# Patient Record
Sex: FEMALE | Race: OTHER | HISPANIC OR LATINO | Employment: UNEMPLOYED | URBAN - METROPOLITAN AREA
[De-identification: names, ages, dates, MRNs, and addresses within clinical notes are randomized per-mention and may not be internally consistent; named-entity substitution may affect disease eponyms.]

---

## 2023-07-20 LAB
EXTERNAL CHLAMYDIA RESULT: NOT DETECTED
N GONORRHOEA RRNA SPEC QL PROBE: NOT DETECTED

## 2023-07-21 LAB
ABO/RH(D) (HISTORICAL): NORMAL
EXTERNAL HEPATITIS B SURFACE ANTIGEN: NORMAL
EXTERNAL SYPHILIS TOTAL IGG/IGM SCREENING: NORMAL

## 2023-11-07 ENCOUNTER — TELEPHONE (OUTPATIENT)
Dept: OBGYN CLINIC | Facility: MEDICAL CENTER | Age: 28
End: 2023-11-07

## 2023-11-07 DIAGNOSIS — Z34.92 SECOND TRIMESTER PREGNANCY: Primary | ICD-10-CM

## 2023-11-07 NOTE — TELEPHONE ENCOUNTER
All records reviewed. OB intake scheduled with us for 11/20. Patient reports insurance will be active after 11/16. MFM referral placed and number provided. LMP: 5/13 - FRANK 2/17/24.

## 2023-11-11 ENCOUNTER — HOSPITAL ENCOUNTER (EMERGENCY)
Facility: HOSPITAL | Age: 28
Discharge: HOME | End: 2023-11-11
Attending: EMERGENCY MEDICINE | Admitting: EMERGENCY MEDICINE
Payer: COMMERCIAL

## 2023-11-11 ENCOUNTER — OFFICE VISIT (OUTPATIENT)
Dept: URGENT CARE | Age: 28
End: 2023-11-11
Payer: COMMERCIAL

## 2023-11-11 VITALS
TEMPERATURE: 98.3 F | WEIGHT: 187.39 LBS | BODY MASS INDEX: 32.79 KG/M2 | DIASTOLIC BLOOD PRESSURE: 64 MMHG | HEART RATE: 71 BPM | OXYGEN SATURATION: 100 % | RESPIRATION RATE: 16 BRPM | SYSTOLIC BLOOD PRESSURE: 109 MMHG

## 2023-11-11 VITALS
TEMPERATURE: 98.6 F | DIASTOLIC BLOOD PRESSURE: 68 MMHG | BODY MASS INDEX: 31.15 KG/M2 | RESPIRATION RATE: 20 BRPM | WEIGHT: 178 LBS | HEART RATE: 76 BPM | SYSTOLIC BLOOD PRESSURE: 108 MMHG | OXYGEN SATURATION: 97 %

## 2023-11-11 DIAGNOSIS — R42 DIZZINESS AND GIDDINESS: Primary | ICD-10-CM

## 2023-11-11 DIAGNOSIS — R51.9 NONINTRACTABLE HEADACHE, UNSPECIFIED CHRONICITY PATTERN, UNSPECIFIED HEADACHE TYPE: ICD-10-CM

## 2023-11-11 DIAGNOSIS — R51.9 HEADACHE: Primary | ICD-10-CM

## 2023-11-11 LAB
ALBUMIN SERPL BCP-MCNC: 3.6 G/DL (ref 3.5–5)
ALP SERPL-CCNC: 52 U/L (ref 34–104)
ALT SERPL W P-5'-P-CCNC: 20 U/L (ref 7–52)
ANION GAP SERPL CALCULATED.3IONS-SCNC: 8 MMOL/L
AST SERPL W P-5'-P-CCNC: 50 U/L (ref 13–39)
BACTERIA UR QL AUTO: NORMAL /HPF
BASOPHILS # BLD MANUAL: 0 THOUSAND/UL (ref 0–0.1)
BASOPHILS NFR MAR MANUAL: 0 % (ref 0–1)
BILIRUB SERPL-MCNC: 0.51 MG/DL (ref 0.2–1)
BILIRUB UR QL STRIP: NEGATIVE
BUN SERPL-MCNC: 12 MG/DL (ref 5–25)
CALCIUM SERPL-MCNC: 9.4 MG/DL (ref 8.4–10.2)
CHLORIDE SERPL-SCNC: 102 MMOL/L (ref 96–108)
CLARITY UR: ABNORMAL
CO2 SERPL-SCNC: 22 MMOL/L (ref 21–32)
COLOR UR: YELLOW
CREAT SERPL-MCNC: 0.5 MG/DL (ref 0.6–1.3)
EOSINOPHIL # BLD MANUAL: 0.29 THOUSAND/UL (ref 0–0.4)
EOSINOPHIL NFR BLD MANUAL: 3 % (ref 0–6)
ERYTHROCYTE [DISTWIDTH] IN BLOOD BY AUTOMATED COUNT: 12.5 % (ref 11.6–15.1)
EXT PREGNANCY TEST URINE: POSITIVE
EXT. CONTROL: ABNORMAL
GFR SERPL CREATININE-BSD FRML MDRD: 132 ML/MIN/1.73SQ M
GLUCOSE SERPL-MCNC: 107 MG/DL (ref 65–140)
GLUCOSE SERPL-MCNC: 79 MG/DL (ref 65–140)
GLUCOSE UR STRIP-MCNC: NEGATIVE MG/DL
HCT VFR BLD AUTO: 31 % (ref 34.8–46.1)
HGB BLD-MCNC: 9.9 G/DL (ref 11.5–15.4)
HGB UR QL STRIP.AUTO: NEGATIVE
KETONES UR STRIP-MCNC: NEGATIVE MG/DL
LEUKOCYTE ESTERASE UR QL STRIP: ABNORMAL
LYMPHOCYTES # BLD AUTO: 1.43 THOUSAND/UL (ref 0.6–4.47)
LYMPHOCYTES # BLD AUTO: 15 % (ref 14–44)
MCH RBC QN AUTO: 27.7 PG (ref 26.8–34.3)
MCHC RBC AUTO-ENTMCNC: 31.9 G/DL (ref 31.4–37.4)
MCV RBC AUTO: 87 FL (ref 82–98)
MONOCYTES # BLD AUTO: 0.1 THOUSAND/UL (ref 0–1.22)
MONOCYTES NFR BLD: 1 % (ref 4–12)
MYELOCYTES NFR BLD MANUAL: 1 % (ref 0–1)
NEUTROPHILS # BLD MANUAL: 7.6 THOUSAND/UL (ref 1.85–7.62)
NEUTS SEG NFR BLD AUTO: 80 % (ref 43–75)
NITRITE UR QL STRIP: NEGATIVE
NON-SQ EPI CELLS URNS QL MICRO: NORMAL /HPF
PH UR STRIP.AUTO: 7 [PH] (ref 4.5–8)
PLATELET # BLD AUTO: 191 THOUSANDS/UL (ref 149–390)
PLATELET BLD QL SMEAR: ADEQUATE
PMV BLD AUTO: 10.2 FL (ref 8.9–12.7)
POTASSIUM SERPL-SCNC: 4.7 MMOL/L (ref 3.5–5.3)
PROT SERPL-MCNC: 6.6 G/DL (ref 6.4–8.4)
PROT UR STRIP-MCNC: NEGATIVE MG/DL
RBC # BLD AUTO: 3.57 MILLION/UL (ref 3.81–5.12)
RBC #/AREA URNS AUTO: NORMAL /HPF
RBC MORPH BLD: NORMAL
SL AMB POCT GLUCOSE BLD: 79
SODIUM SERPL-SCNC: 132 MMOL/L (ref 135–147)
SP GR UR STRIP.AUTO: 1.02 (ref 1–1.03)
UROBILINOGEN UR QL STRIP.AUTO: 0.2 E.U./DL
WBC # BLD AUTO: 9.5 THOUSAND/UL (ref 4.31–10.16)
WBC #/AREA URNS AUTO: NORMAL /HPF

## 2023-11-11 PROCEDURE — 36415 COLL VENOUS BLD VENIPUNCTURE: CPT | Performed by: EMERGENCY MEDICINE

## 2023-11-11 PROCEDURE — 80053 COMPREHEN METABOLIC PANEL: CPT | Performed by: EMERGENCY MEDICINE

## 2023-11-11 PROCEDURE — 85007 BL SMEAR W/DIFF WBC COUNT: CPT | Performed by: EMERGENCY MEDICINE

## 2023-11-11 PROCEDURE — 81025 URINE PREGNANCY TEST: CPT | Performed by: EMERGENCY MEDICINE

## 2023-11-11 PROCEDURE — 96374 THER/PROPH/DIAG INJ IV PUSH: CPT

## 2023-11-11 PROCEDURE — 96361 HYDRATE IV INFUSION ADD-ON: CPT

## 2023-11-11 PROCEDURE — 82948 REAGENT STRIP/BLOOD GLUCOSE: CPT | Performed by: FAMILY MEDICINE

## 2023-11-11 PROCEDURE — 87086 URINE CULTURE/COLONY COUNT: CPT

## 2023-11-11 PROCEDURE — 81001 URINALYSIS AUTO W/SCOPE: CPT

## 2023-11-11 PROCEDURE — 96375 TX/PRO/DX INJ NEW DRUG ADDON: CPT

## 2023-11-11 PROCEDURE — 99213 OFFICE O/P EST LOW 20 MIN: CPT | Performed by: FAMILY MEDICINE

## 2023-11-11 PROCEDURE — 85027 COMPLETE CBC AUTOMATED: CPT | Performed by: EMERGENCY MEDICINE

## 2023-11-11 PROCEDURE — 99284 EMERGENCY DEPT VISIT MOD MDM: CPT

## 2023-11-11 PROCEDURE — 99284 EMERGENCY DEPT VISIT MOD MDM: CPT | Performed by: EMERGENCY MEDICINE

## 2023-11-11 RX ORDER — METOCLOPRAMIDE HYDROCHLORIDE 5 MG/ML
10 INJECTION INTRAMUSCULAR; INTRAVENOUS ONCE
Status: COMPLETED | OUTPATIENT
Start: 2023-11-11 | End: 2023-11-11

## 2023-11-11 RX ORDER — DIPHENHYDRAMINE HYDROCHLORIDE 50 MG/ML
25 INJECTION INTRAMUSCULAR; INTRAVENOUS ONCE
Status: COMPLETED | OUTPATIENT
Start: 2023-11-11 | End: 2023-11-11

## 2023-11-11 RX ADMIN — DIPHENHYDRAMINE HYDROCHLORIDE 25 MG: 50 INJECTION, SOLUTION INTRAMUSCULAR; INTRAVENOUS at 15:21

## 2023-11-11 RX ADMIN — METOCLOPRAMIDE 10 MG: 5 INJECTION, SOLUTION INTRAMUSCULAR; INTRAVENOUS at 15:21

## 2023-11-11 RX ADMIN — SODIUM CHLORIDE 1000 ML: 0.9 INJECTION, SOLUTION INTRAVENOUS at 15:24

## 2023-11-11 NOTE — PATIENT INSTRUCTIONS
Random glucose in the office was 78. Orthostatic blood pressure reading did not show any acute change in blood pressure. Patient however still symptomatic. I referred the patient to [facility] emergency room for further evaluation. Transported by private vehicle in stable condition. Dizziness   WHAT YOU NEED TO KNOW:   Dizziness is a feeling of being off balance or unsteady. Common causes of dizziness are an inner ear fluid imbalance or a lack of oxygen in your blood. Dizziness may be acute (lasts 3 days or less) or chronic (lasts longer than 3 days). You may have dizzy spells that last from seconds to a few hours. DISCHARGE INSTRUCTIONS:   Return to the emergency department if:   You have a headache and a stiff neck. You have shaking chills and a fever. You vomit over and over with no relief. Your vomit or bowel movements are red or black. You have pain in your chest, back, or abdomen. You have numbness, especially in your face, arms, or legs. You have trouble moving your arms or legs. You are confused. Contact your healthcare provider if:   You have a fever. Your symptoms do not get better with treatment. You have questions or concerns about your condition or care. Manage your symptoms:   Do not drive  or operate heavy machinery when you are dizzy. Get up slowly  from sitting or lying down. Drink plenty of liquids. Liquids help prevent dehydration. Ask how much liquid to drink each day and which liquids are best for you. Follow up with your doctor as directed:  Write down your questions so you remember to ask them during your visits. © Copyright letta [employer] 2023 Information is for End User's use only and may not be sold, redistributed or otherwise used for commercial purposes. The above information is an  only. It is not intended as medical advice for individual conditions or treatments.  Talk to your doctor, nurse or pharmacist before following any medical regimen to see if it is safe and effective for you.

## 2023-11-11 NOTE — ED PROVIDER NOTES
Pt Name: Jacqui Bejarano  MRN: [id number]  [address] 1995  Age/Sex: 29 y.o. female  Date of evaluation: 2023  PCP: Rodney Diego, [address]    Chief Complaint   Patient presents with    Headache     Pt c/o headache since last night, reports had episode where felt lightheaded. Hx migraines. Reports being 26 weeks pregnant denies any vaginal bleeding and contraction. HPI    Rima Andrew presents to the Emergency Department complaining of migraine headache. She had been on preventative maintenance medication prior to be getting pregnant. She is currently   at 26 weeks and this is her first significant headache this pregnancy. She took tylenol for the headache but it did not seem to help. Today she started to feel slightly lightheaded and was concerned. This is new and not usually associated with her headaches. She has no pregnancy related complaints. HPI      Past Medical and Surgical History    Past Medical History:   Diagnosis Date    Anxiety     Asthma     Disease of thyroid gland     Enteritis 12/10/2018    Pancytopenia ([address]) 2018       Past Surgical History:   Procedure Laterality Date    EXTERNAL EAR SURGERY         Family History   Problem Relation Age of Onset    Heart disease Maternal Grandmother     Hypertension Maternal Grandmother     Hyperlipidemia Maternal Grandmother     Heart disease Paternal Grandfather     Hypertension Father     No Known Problems Mother        Social History     Tobacco Use    Smoking status: Never    Smokeless tobacco: Never   Vaping Use    Vaping Use: Never used   Substance Use Topics    Alcohol use: Yes     Comment: social     Drug use: No         .    Allergies    No Known Allergies    Home Medications    Prior to Admission medications    Medication Sig Start Date End Date Taking?  Authorizing Provider   albuterol (PROVENTIL HFA,VENTOLIN HFA) 90 mcg/act inhaler Inhale 2 puffs every 6 (six) hours as needed for wheezing or shortness of breath 9/0/68   Eduardo Spatz, DO   clindamycin (CLEOCIN T) 1 %   8/19/20   Historical Provider, MD   Ferrous Sulfate (IRON PO) Take 1 tablet by mouth daily    Historical Provider, MD   levothyroxine 25 mcg tablet Take 1 tablet (25 mcg total) by mouth daily 62/0/24   Parker Francisco DO   MAGNESIUM PO Take 1 tablet by mouth daily    Historical Provider, MD   medroxyPROGESTERone (PROVERA) 10 mg tablet Take 1 tablet (10 mg total) by mouth in the morning and 1 tablet (10 mg total) before bedtime. Do all this for 5 days. 5/19/22 5/24/22  Dunia Olivia MD   Sulfacetamide-Sulfur-Cleanser (SULFACETAMIDE SOD-SULFUR [facility] EX) Apply topically      Historical Provider, MD   TRETINOIN EX Apply topically      Historical Provider, MD           Review of Systems    Review of Systems   Constitutional:  Negative for chills and fever. HENT:  Negative for ear pain and sore throat. Eyes:  Negative for pain and visual disturbance. Respiratory:  Negative for cough and shortness of breath. Cardiovascular:  Negative for chest pain and palpitations. Gastrointestinal:  Negative for abdominal pain and vomiting. Genitourinary:  Negative for dysuria and hematuria. Musculoskeletal:  Negative for arthralgias and back pain. Skin:  Negative for color change and rash. Neurological:  Positive for headaches. Negative for seizures and syncope. All other systems reviewed and are negative. All other systems reviewed and negative.     Physical Exam      ED Triage Vitals   Temperature Pulse Respirations Blood Pressure SpO2   11/11/23 1422 11/11/23 1422 11/11/23 1422 11/11/23 1422 11/11/23 1422   98.3 °F (36.8 °C) 93 16 117/57 98 %      Temp Source Heart Rate Source Patient Position - Orthostatic VS BP Location FiO2 (%)   11/11/23 1422 11/11/23 1422 11/11/23 1422 11/11/23 1422 --   Oral Monitor Sitting Right arm       Pain Score       11/11/23 1458       4               Physical Exam  Vitals and nursing note reviewed. Constitutional:       General: She is not in acute distress. Appearance: She is well-developed. HENT:      Head: Normocephalic and atraumatic. Eyes:      Conjunctiva/sclera: Conjunctivae normal.   Cardiovascular:      Rate and Rhythm: Normal rate and regular rhythm. Heart sounds: No murmur heard. Pulmonary:      Effort: Pulmonary effort is normal. No respiratory distress. Breath sounds: Normal breath sounds. Abdominal:      Palpations: Abdomen is soft. Tenderness: There is no abdominal tenderness. Musculoskeletal:         General: No swelling. Cervical back: Neck supple. Skin:     General: Skin is warm and dry. Capillary Refill: Capillary refill takes less than 2 seconds. Neurological:      Mental Status: She is alert. Psychiatric:         Mood and Affect: Mood normal.           Assessment and Plan    Eduardo Fuller is a 29 y.o. female who presents with migraine headache. Physical examination remarkable for gravid uterus. Differential diagnosis (not completely inclusive) includes migraine headache. Plan will be to perform diagnostic testing and treat symptomatically.       MDM      Diagnostic Results        Labs:    Results for orders placed or performed during the hospital encounter of 11/11/23   CBC and differential   Result Value Ref Range    WBC 9.50 4.31 - 10.16 Thousand/uL    RBC 3.57 (L) 3.81 - 5.12 Million/uL    Hemoglobin 9.9 (L) 11.5 - 15.4 g/dL    Hematocrit 31.0 (L) 34.8 - 46.1 %    MCV 87 82 - 98 fL    MCH 27.7 26.8 - 34.3 pg    MCHC 31.9 31.4 - 37.4 g/dL    RDW 12.5 11.6 - 15.1 %    MPV 10.2 8.9 - 12.7 fL    Platelets 872 263 - 103 Thousands/uL   Comprehensive metabolic panel   Result Value Ref Range    Sodium 132 (L) 135 - 147 mmol/L    Potassium 4.7 3.5 - 5.3 mmol/L    Chloride 102 96 - 108 mmol/L    CO2 22 21 - 32 mmol/L    ANION GAP 8 mmol/L    BUN 12 5 - 25 mg/dL    Creatinine 0.50 (L) 0.60 - 1.30 mg/dL    Glucose 107 65 - 140 mg/dL Calcium 9.4 8.4 - 10.2 mg/dL    AST 50 (H) 13 - 39 U/L    ALT 20 7 - 52 U/L    Alkaline Phosphatase 52 34 - 104 U/L    Total Protein 6.6 6.4 - 8.4 g/dL    Albumin 3.6 3.5 - 5.0 g/dL    Total Bilirubin 0.51 0.20 - 1.00 mg/dL    eGFR 132 ml/min/1.73sq m   Urine Microscopic   Result Value Ref Range    RBC, UA None Seen None Seen, 1-2 /hpf    WBC, UA None Seen None Seen, 1-2 /hpf    Epithelial Cells Occasional None Seen, Occasional /hpf    Bacteria, UA Occasional None Seen, Occasional /hpf   POCT pregnancy, urine   Result Value Ref Range    EXT Preg Test, Ur Positive (A)     Control Valid    Urine Macroscopic, POC   Result Value Ref Range    Color, UA Yellow     Clarity, UA Cloudy     pH, UA 7.0 4.5 - 8.0    Leukocytes, UA Small (A) Negative    Nitrite, UA Negative Negative    Protein, UA Negative Negative mg/dl    Glucose, UA Negative Negative mg/dl    Ketones, UA Negative Negative mg/dl    Urobilinogen, UA 0.2 0.2, 1.0 E.U./dl E.U./dl    Bilirubin, UA Negative Negative    Occult Blood, UA Negative Negative    Specific Gravity, UA 1.020 1.003 - 1.030   Manual Differential(PHLEBS Do Not Order)   Result Value Ref Range    Segmented % 80 (H) 43 - 75 %    Lymphocytes % 15 14 - 44 %    Monocytes % 1 (L) 4 - 12 %    Eosinophils, % 3 0 - 6 %    Basophils % 0 0 - 1 %    Myelocytes % 1 0 - 1 %    Absolute Neutrophils 7.60 1.85 - 7.62 Thousand/uL    Lymphocytes Absolute 1.43 0.60 - 4.47 Thousand/uL    Monocytes Absolute 0.10 0.00 - 1.22 Thousand/uL    Eosinophils Absolute 0.29 0.00 - 0.40 Thousand/uL    Basophils Absolute 0.00 0.00 - 0.10 Thousand/uL    Total Counted      RBC Morphology Normal     Platelet Estimate Adequate Adequate       All labs reviewed and utilized in the medical decision making process    Radiology:    No orders to display       All radiology studies independently viewed by me and interpreted by the radiologist.    Procedure    Procedures      ED Course of Care and Re-Assessments    Patient was feeling better after meds and IVF. Fetal heart tones normal and she has been feeling the baby moving. Medications   sodium chloride 0.9 % bolus 1,000 mL (0 mL Intravenous Stopped 11/11/23 1712)   metoclopramide (REGLAN) injection 10 mg (10 mg Intravenous Given 11/11/23 1521)   diphenhydrAMINE (BENADRYL) injection 25 mg (25 mg Intravenous Given 11/11/23 1521)           FINAL IMPRESSION    Final diagnoses:   Headache         DISPOSITION/PLAN    Time reflects when diagnosis was documented in both MDM as applicable and the Disposition within this note       Time User Action Codes Description Comment    11/11/2023  5:04 PM Carmela Hawthorne Add [R51.9] Headache           ED Disposition       ED Disposition   Discharge    Condition   Stable    Date/Time   Sat Nov 11, 2023  5:04 PM    Comment   Galileo Barrera discharge to home/self care.                    Follow-up Information       Follow up With Specialties Details Why [address] [facility] [address] [facility] [address] [place], [address] [facility] Nurse Practitioner   [address] [facility], [address] [place], [place] [phone]                PATIENT REFERRED TO:    [address] [place] [address]  [address] [facility] [address]  [address] [place] [address]  [phone]            DISCHARGE MEDICATIONS:    Discharge Medication List as of 11/11/2023  5:05 PM        CONTINUE these medications which have NOT CHANGED    Details   albuterol (PROVENTIL HFA,VENTOLIN HFA) 90 mcg/act inhaler Inhale 2 puffs every 6 (six) hours as needed for wheezing or shortness of breath, Starting Fri 5/6/2022, Normal      clindamycin (CLEOCIN T) 1 %  , Starting Wed 8/19/2020, Historical Med      Ferrous Sulfate (IRON PO) Take 1 tablet by mouth daily, Historical Med      levothyroxine 25 mcg tablet Take 1 tablet (25 mcg total) by mouth daily, Starting Tue 12/6/2022, Normal      MAGNESIUM PO Take 1 tablet by mouth daily, Historical Med      medroxyPROGESTERone (PROVERA) 10 mg tablet Take 1 tablet (10 mg total) by mouth in the morning and 1 tablet (10 mg total) before bedtime. Do all this for 5 days. , Starting Thu 5/19/2022, Until Tue 5/24/2022, Normal      Sulfacetamide-Sulfur-Cleanser (SULFACETAMIDE SOD-SULFUR [facility] EX) Apply topically  , Historical Med      TRETINOIN EX Apply topically  , Historical Med             No discharge procedures on file.          Dalila Dillard, [address] [facility], DO  11/11/23 [id number]

## 2023-11-11 NOTE — PROGRESS NOTES
[facility]        NAME: Jacqui Bejarano is a 29 y.o. female  : 1995    MRN: [id number]  DATE: 2023  TIME: 12:41 PM    Assessment and Plan   Dizziness and giddiness [R42]  1. Dizziness and giddiness  POCT blood glucose      2. Nonintractable headache, unspecified chronicity pattern, unspecified headache type              Patient Instructions       Follow up with PCP in 3-5 days. Proceed to  ER if symptoms worsen. Chief Complaint     Chief Complaint   Patient presents with    Dizziness     Headache began began yesterday, dizziness/nose bleed began today         History of Present Illness       70-year-old female here today with complaints of cute onset of headache that began this morning. It is right-sided pressure-like throbbing at times. She took some Tylenol this morning with no significant improvement. Patient has a known history of migraine and he was on Maxalt and Topamax in the past however she was advised to stop this by her neurologist since she became pregnant. She is currently 26 weeks pregnant. Her pregnancy has otherwise been uneventful. Presently she is complaining of fullness pressure in the right ear. Mild nasal congestion. Feeling short of breath. She has no history of asthma which has been relatively stable. She has not used her rescue inhaler in quite a while. Also she describes dizziness which developed this morning near syncope but did not pass out. Denies any red flags such as numbness weakness face arms or legs. Lastly she describes blood tinged nasal discharge from right nostril this morning. Review of Systems   Review of Systems   Constitutional: Negative. HENT:  Positive for congestion and ear pain. Eyes:  Negative for visual disturbance. Respiratory:  Positive for shortness of breath. Cardiovascular: Negative. Neurological:  Positive for dizziness, light-headedness and headaches.          Current Medications       Current Outpatient Medications:     albuterol (PROVENTIL HFA,VENTOLIN HFA) 90 mcg/act inhaler, Inhale 2 puffs every 6 (six) hours as needed for wheezing or shortness of breath, Disp: 6.7 g, Rfl: 0    levothyroxine 25 mcg tablet, Take 1 tablet (25 mcg total) by mouth daily, Disp: 90 tablet, Rfl: 0    clindamycin (CLEOCIN T) 1 %,  , Disp: , Rfl:     Ferrous Sulfate (IRON PO), Take 1 tablet by mouth daily, Disp: , Rfl:     MAGNESIUM PO, Take 1 tablet by mouth daily, Disp: , Rfl:     medroxyPROGESTERone (PROVERA) 10 mg tablet, Take 1 tablet (10 mg total) by mouth in the morning and 1 tablet (10 mg total) before bedtime. Do all this for 5 days. , Disp: 10 tablet, Rfl: 0    Sulfacetamide-Sulfur-Cleanser (SULFACETAMIDE SOD-SULFUR [address] EX), Apply topically  , Disp: , Rfl:     TRETINOIN EX, Apply topically  , Disp: , Rfl:     Current Allergies     Allergies as of 11/11/2023    (No Known Allergies)            The following portions of the patient's history were reviewed and updated as appropriate: allergies, current medications, past family history, past medical history, past social history, past surgical history and problem list.     Past Medical History:   Diagnosis Date    Anxiety     Asthma     Disease of thyroid gland     Enteritis 12/10/2018    Pancytopenia ([address]) 12/12/2018       Past Surgical History:   Procedure Laterality Date    EXTERNAL EAR SURGERY         Family History   Problem Relation Age of Onset    Heart disease Maternal Grandmother     Hypertension Maternal Grandmother     Hyperlipidemia Maternal Grandmother     Heart disease Paternal Grandfather     Hypertension Father     No Known Problems Mother          Medications have been verified. Objective   /66   Pulse 94   Temp 98.6 °F (37 °C)   Resp 20   Wt 80.7 kg (178 lb)   SpO2 97%   BMI 31.15 kg/m²   No LMP recorded. Physical Exam     Physical Exam  Vitals and nursing note reviewed. Constitutional:       Appearance: Normal appearance. HENT:      Right Ear: Tympanic membrane normal.      Left Ear: Tympanic membrane normal.      Nose:      Comments: Hypertrophic erythematous left turbinate. Mouth/Throat:      Mouth: Mucous membranes are moist.   Eyes:      Extraocular Movements: Extraocular movements intact. Pupils: Pupils are equal, round, and reactive to light. Cardiovascular:      Rate and Rhythm: Normal rate and regular rhythm. Pulmonary:      Effort: Pulmonary effort is normal.      Breath sounds: Normal breath sounds. Musculoskeletal:      Cervical back: Normal range of motion and neck supple. Neurological:      General: No focal deficit present. Mental Status: She is alert and oriented to person, place, and time. Comments: Romberg test-negative.   Gait-normal.

## 2023-11-13 ENCOUNTER — OCCMED (OUTPATIENT)
Dept: URGENT CARE | Facility: CLINIC | Age: 28
End: 2023-11-13

## 2023-11-13 ENCOUNTER — APPOINTMENT (OUTPATIENT)
Dept: LAB | Facility: CLINIC | Age: 28
End: 2023-11-13

## 2023-11-13 ENCOUNTER — TELEPHONE (OUTPATIENT)
Dept: OBGYN CLINIC | Facility: MEDICAL CENTER | Age: 28
End: 2023-11-13

## 2023-11-13 DIAGNOSIS — Z02.1 PHYSICAL EXAM, PRE-EMPLOYMENT: Primary | ICD-10-CM

## 2023-11-13 DIAGNOSIS — Z02.1 PHYSICAL EXAM, PRE-EMPLOYMENT: ICD-10-CM

## 2023-11-13 LAB — RUBV IGG SERPL IA-ACNC: 34.7 IU/ML

## 2023-11-13 PROCEDURE — 86735 MUMPS ANTIBODY: CPT

## 2023-11-13 PROCEDURE — 86787 VARICELLA-ZOSTER ANTIBODY: CPT

## 2023-11-13 PROCEDURE — 86765 RUBEOLA ANTIBODY: CPT

## 2023-11-13 PROCEDURE — 86762 RUBELLA ANTIBODY: CPT

## 2023-11-13 PROCEDURE — 86480 TB TEST CELL IMMUN MEASURE: CPT

## 2023-11-13 PROCEDURE — 36415 COLL VENOUS BLD VENIPUNCTURE: CPT

## 2023-11-13 NOTE — TELEPHONE ENCOUNTER
Pt is a new patient transfer to our practice and called in with questions regarding a migraine she is having. Please review.  Currently pregnant

## 2023-11-13 NOTE — TELEPHONE ENCOUNTER
Pregnancy medication list reviewed. Patient scheduled for intake this Thursday and initial ob appointment to follow. Patient is currently 26 weeks. [facility] transfer.

## 2023-11-14 LAB
BACTERIA UR CULT: ABNORMAL
BACTERIA UR CULT: ABNORMAL
GAMMA INTERFERON BACKGROUND BLD IA-ACNC: 0.03 IU/ML
M TB IFN-G BLD-IMP: NEGATIVE
M TB IFN-G CD4+ BCKGRND COR BLD-ACNC: -0.02 IU/ML
M TB IFN-G CD4+ BCKGRND COR BLD-ACNC: 0 IU/ML
MEV IGG SER QL IA: NORMAL
MITOGEN IGNF BCKGRD COR BLD-ACNC: 6.83 IU/ML
MUV IGG SER QL IA: NORMAL
VZV IGG SER QL IA: ABNORMAL

## 2023-11-16 ENCOUNTER — INITIAL PRENATAL (OUTPATIENT)
Dept: OBGYN CLINIC | Facility: MEDICAL CENTER | Age: 28
End: 2023-11-16
Payer: COMMERCIAL

## 2023-11-16 ENCOUNTER — APPOINTMENT (OUTPATIENT)
Dept: LAB | Facility: MEDICAL CENTER | Age: 28
End: 2023-11-16
Payer: COMMERCIAL

## 2023-11-16 VITALS — SYSTOLIC BLOOD PRESSURE: 108 MMHG | DIASTOLIC BLOOD PRESSURE: 60 MMHG | BODY MASS INDEX: 32.89 KG/M2 | WEIGHT: 188 LBS

## 2023-11-16 VITALS
SYSTOLIC BLOOD PRESSURE: 108 MMHG | WEIGHT: 188.4 LBS | DIASTOLIC BLOOD PRESSURE: 60 MMHG | BODY MASS INDEX: 33.38 KG/M2 | HEIGHT: 63 IN

## 2023-11-16 DIAGNOSIS — D50.9 IRON DEFICIENCY ANEMIA DURING PREGNANCY: ICD-10-CM

## 2023-11-16 DIAGNOSIS — R35.0 URINE FREQUENCY: ICD-10-CM

## 2023-11-16 DIAGNOSIS — E03.9 HYPOTHYROID IN PREGNANCY, ANTEPARTUM, SECOND TRIMESTER: Primary | ICD-10-CM

## 2023-11-16 DIAGNOSIS — O99.282 HYPOTHYROID IN PREGNANCY, ANTEPARTUM, SECOND TRIMESTER: Primary | ICD-10-CM

## 2023-11-16 DIAGNOSIS — J45.20 MILD INTERMITTENT ASTHMA WITHOUT COMPLICATION: ICD-10-CM

## 2023-11-16 DIAGNOSIS — Z34.92 SECOND TRIMESTER PREGNANCY: ICD-10-CM

## 2023-11-16 DIAGNOSIS — Z34.92 SECOND TRIMESTER PREGNANCY: Primary | ICD-10-CM

## 2023-11-16 DIAGNOSIS — O99.019 IRON DEFICIENCY ANEMIA DURING PREGNANCY: ICD-10-CM

## 2023-11-16 PROBLEM — O99.280 THYROID DISEASE AFFECTING PREGNANCY: Status: ACTIVE | Noted: 2023-11-16

## 2023-11-16 PROBLEM — O99.512 ASTHMA AFFECTING PREGNANCY IN SECOND TRIMESTER: Status: ACTIVE | Noted: 2023-11-16

## 2023-11-16 PROBLEM — E07.9 THYROID DISEASE AFFECTING PREGNANCY: Status: ACTIVE | Noted: 2023-11-16

## 2023-11-16 PROBLEM — J45.909 ASTHMA AFFECTING PREGNANCY IN SECOND TRIMESTER: Status: ACTIVE | Noted: 2023-11-16

## 2023-11-16 LAB
BASOPHILS # BLD MANUAL: 0 THOUSAND/UL (ref 0–0.1)
BASOPHILS NFR MAR MANUAL: 0 % (ref 0–1)
EOSINOPHIL # BLD MANUAL: 0.1 THOUSAND/UL (ref 0–0.4)
EOSINOPHIL NFR BLD MANUAL: 1 % (ref 0–6)
ERYTHROCYTE [DISTWIDTH] IN BLOOD BY AUTOMATED COUNT: 12.7 % (ref 11.6–15.1)
EXTERNAL HIV-1/2 AB-AG: NORMAL
GLUCOSE 1H P 50 G GLC PO SERPL-MCNC: 93 MG/DL (ref 40–134)
HBV SURFACE AB SER-ACNC: 401 MIU/ML
HCT VFR BLD AUTO: 34.6 % (ref 34.8–46.1)
HCV AB SER QL: NORMAL
HGB BLD-MCNC: 11 G/DL (ref 11.5–15.4)
LYMPHOCYTES # BLD AUTO: 1.03 THOUSAND/UL (ref 0.6–4.47)
LYMPHOCYTES # BLD AUTO: 10 % (ref 14–44)
MCH RBC QN AUTO: 28.1 PG (ref 26.8–34.3)
MCHC RBC AUTO-ENTMCNC: 31.8 G/DL (ref 31.4–37.4)
MCV RBC AUTO: 88 FL (ref 82–98)
MONOCYTES # BLD AUTO: 0.1 THOUSAND/UL (ref 0–1.22)
MONOCYTES NFR BLD: 1 % (ref 4–12)
NEUTROPHILS # BLD MANUAL: 9.08 THOUSAND/UL (ref 1.85–7.62)
NEUTS BAND NFR BLD MANUAL: 6 % (ref 0–8)
NEUTS SEG NFR BLD AUTO: 82 % (ref 43–75)
PLATELET # BLD AUTO: 215 THOUSANDS/UL (ref 149–390)
PLATELET BLD QL SMEAR: ADEQUATE
PMV BLD AUTO: 10.7 FL (ref 8.9–12.7)
RBC # BLD AUTO: 3.92 MILLION/UL (ref 3.81–5.12)
RBC MORPH BLD: NORMAL
RUBELLA, IGG (HISTORICAL): NORMAL
SL AMB  POCT GLUCOSE, UA: NEGATIVE
SL AMB LEUKOCYTE ESTERASE,UA: NEGATIVE
SL AMB POCT BILIRUBIN,UA: NEGATIVE
SL AMB POCT BLOOD,UA: NEGATIVE
SL AMB POCT CLARITY,UA: CLEAR
SL AMB POCT COLOR,UA: YELLOW
SL AMB POCT KETONES,UA: NEGATIVE
SL AMB POCT NITRITE,UA: NEGATIVE
SL AMB POCT PH,UA: 7
SL AMB POCT SPECIFIC GRAVITY,UA: NEGATIVE
SL AMB POCT URINE PROTEIN: NEGATIVE
SL AMB POCT UROBILINOGEN: NEGATIVE
TREPONEMA PALLIDUM IGG+IGM AB [PRESENCE] IN SERUM OR PLASMA BY IMMUNOASSAY: NORMAL
TSH SERPL DL<=0.05 MIU/L-ACNC: 1.16 UIU/ML (ref 0.45–4.5)
WBC # BLD AUTO: 10.32 THOUSAND/UL (ref 4.31–10.16)

## 2023-11-16 PROCEDURE — 82950 GLUCOSE TEST: CPT

## 2023-11-16 PROCEDURE — 81002 URINALYSIS NONAUTO W/O SCOPE: CPT | Performed by: STUDENT IN AN ORGANIZED HEALTH CARE EDUCATION/TRAINING PROGRAM

## 2023-11-16 PROCEDURE — 84443 ASSAY THYROID STIM HORMONE: CPT

## 2023-11-16 PROCEDURE — 86803 HEPATITIS C AB TEST: CPT

## 2023-11-16 PROCEDURE — T1001 NURSING ASSESSMENT/EVALUATN: HCPCS

## 2023-11-16 PROCEDURE — 85007 BL SMEAR W/DIFF WBC COUNT: CPT

## 2023-11-16 PROCEDURE — 86780 TREPONEMA PALLIDUM: CPT

## 2023-11-16 PROCEDURE — 99214 OFFICE O/P EST MOD 30 MIN: CPT | Performed by: STUDENT IN AN ORGANIZED HEALTH CARE EDUCATION/TRAINING PROGRAM

## 2023-11-16 PROCEDURE — 86706 HEP B SURFACE ANTIBODY: CPT

## 2023-11-16 PROCEDURE — 36415 COLL VENOUS BLD VENIPUNCTURE: CPT

## 2023-11-16 PROCEDURE — 85027 COMPLETE CBC AUTOMATED: CPT

## 2023-11-16 RX ORDER — FERROUS SULFATE 324(65)MG
324 TABLET, DELAYED RELEASE (ENTERIC COATED) ORAL EVERY OTHER DAY
Qty: 60 TABLET | Refills: 3 | Status: SHIPPED | OUTPATIENT
Start: 2023-11-16 | End: 2024-11-15

## 2023-11-16 NOTE — PROGRESS NOTES
Routine Prenatal Visit  [facility] [address] [place] [address]    Assessment/Plan:  Destinee Tabor is a 29y.o. year old  at 26w5d who presents for routine prenatal visit. 1. Hypothyroid in pregnancy, antepartum, second trimester    2. Iron deficiency anemia during pregnancy  -     ferrous sulfate 324 (65 Fe) mg; Take 1 tablet (324 mg total) by mouth every other day    3. Mild intermittent asthma without complication          Subjective:     CC: Prenatal care    Graciela Bustillo is a 29 y.o.  female who presents for routine prenatal care at 26w5d. Pregnancy ROS: Denies leakage of fluid, pelvic pain, or vaginal bleeding. Reports fetal movement. The following portions of the patient's history were reviewed and updated as appropriate: allergies, current medications, past family history, past medical history, obstetric history, gynecologic history, past social history, past surgical history and problem list.      Objective:  /60   Wt 85.3 kg (188 lb)   LMP 2023   BMI 32.89 kg/m²   Pregravid Weight/BMI: Pregravid weight not on file (BMI Could not be calculated)  Current Weight: 85.3 kg (188 lb)   Total Weight Gain: Not found. Pre-Braden Vitals      Flowsheet Row Most Recent Value   Prenatal Assessment    Prenatal Vitals    Blood Pressure 108/60   Weight - Scale 85.3 kg (188 lb)   Urine Albumin/Glucose    Dilation/Effacement/Station    Vaginal Drainage    Draining Fluid No   Edema    LLE Edema None   RLE Edema None   Facial Edema None             General: Well appearing, no distress  Respiratory: Unlabored breathing  Cardiovascular: Regular rate. Abdomen: Soft, gravid, nontender  Fundal Height: Appropriate for gestational age. Extremities: Warm and well perfused. Non tender.     Willi Crowley MD  [address] [address] [facility]  2023 10:58 AM

## 2023-11-16 NOTE — PROGRESS NOTES
OB History    Para Term  AB Living   1 0 0 0 0 0   SAB IAB Ectopic Multiple Live Births   0 0 0 0 0      # Outcome Date GA Lbr Patric/2nd Weight Sex Delivery Anes PTL Lv   1 Current                  * Pt presents for OB intake  *  *Pt's LMP was Patient's last menstrual period was 2023. *Ultrasound date:23   12 weeks 4 days  *Estimated date of delivery: 24   * confirmed by LMP    *Signs/Symptoms of Pregnancy   *no Constipation    *no Headaches   *no Cramping  *no Spotting      Diabetes     *no Hx of GDM    *no BMI >35    *no First degree relative with type 2 diabetes    *no Hx of PCOS    Hypertension-    *no Hx of chronic HTN    *no Hx of gestational HTN   *no hx of preeclampsia, eclampsia, or HELLP syndrome     *Infection Screening   *no Does the pt have a hx of MRSA? *no History of herpes? *Immunizations:   *yes Discussed influenza vaccine - received 2023   *yes Discussed TDaP vaccine   *yes COVID Vaccine x3    SOCIOECONOMIC:  Mental/Physical/Sexual Abuse  *no  Housing Security  *no  Food Security  *no  Special Needs/Challenges  *no  Substance Use Disorder   ETOH   *no    OPIOD   *no     THC *no    Other: no    ACTIVE MEDICAL/MENTAL HEALTH CONDITIONS  Asthma: *yes   Depression *no   Anxiety*yes  Medications*no  -Patient reports coping well.     *Interview education   *Handouts given:    *Baby and Me support center     *Lab Locations    * .com Childbirth and Parenting Classes    *Schedule for Prenatal Visits    *Pregnancy Warning Signs Reviewed    *Safe Medications During Pregnancy    *CPT Code Sheet/MFM 13week NT pamphlet    *[employer]      SBIRT Screen:  Depression Screening Follow-up Plan: Patient's depression screening was negative with an [place] score of 2.        *[facility]   *yes discussed genetic testing- pt interested   Patient has been informed of basic prenatal advice such as avoiding alcohol, drugs, and smoking. She should remain hydrated and take daily prenatal vitamins. Patient should limit caffeine, raw sprouts, high mercury fish, undercooked fish, raw eggs, organ meat, unwashed produce, and unpasteurized cheeses, milk, and fruit juice and undercooked meats. She has been informed about toxoplasmosis and to avoid cat feces. *Details that I feel the provider should be aware of:  Janie Morales came in for her OB intake at 26w5d. Patient is a transfer from [facility]. Prenatal panel completed. Patient has MFM appointment scheduled for tomorrow. Patient with h/o hypothyroidism. TSH, 1 hour GTT, CBC, Anemia Panel, RPR Hep B surface antibody and Hep C ordered. Patient had NIPT done which resulted low-risk. Patient is having a baby boy! PN1 visit scheduled for *11/16. The patient was oriented to our practice and all questions were answered.     Interviewed by:  Thao Frarell RN

## 2023-11-17 ENCOUNTER — ROUTINE PRENATAL (OUTPATIENT)
Dept: PERINATAL CARE | Facility: OTHER | Age: 28
End: 2023-11-17
Payer: COMMERCIAL

## 2023-11-17 VITALS
WEIGHT: 189.2 LBS | SYSTOLIC BLOOD PRESSURE: 124 MMHG | DIASTOLIC BLOOD PRESSURE: 68 MMHG | BODY MASS INDEX: 32.3 KG/M2 | HEIGHT: 64 IN | HEART RATE: 87 BPM

## 2023-11-17 DIAGNOSIS — E07.9 THYROID DISEASE AFFECTING PREGNANCY: Primary | ICD-10-CM

## 2023-11-17 DIAGNOSIS — O99.280 THYROID DISEASE AFFECTING PREGNANCY: Primary | ICD-10-CM

## 2023-11-17 DIAGNOSIS — O99.512 ASTHMA AFFECTING PREGNANCY IN SECOND TRIMESTER: ICD-10-CM

## 2023-11-17 DIAGNOSIS — Z34.92 SECOND TRIMESTER PREGNANCY: ICD-10-CM

## 2023-11-17 DIAGNOSIS — Z36.3 ENCOUNTER FOR ANTENATAL SCREENING FOR MALFORMATIONS: ICD-10-CM

## 2023-11-17 DIAGNOSIS — Z3A.26 26 WEEKS GESTATION OF PREGNANCY: ICD-10-CM

## 2023-11-17 DIAGNOSIS — J45.909 ASTHMA AFFECTING PREGNANCY IN SECOND TRIMESTER: ICD-10-CM

## 2023-11-17 PROBLEM — E03.9 ACQUIRED HYPOTHYROIDISM: Status: RESOLVED | Noted: 2018-12-11 | Resolved: 2023-11-17

## 2023-11-17 PROCEDURE — 99203 OFFICE O/P NEW LOW 30 MIN: CPT | Performed by: OBSTETRICS & GYNECOLOGY

## 2023-11-17 PROCEDURE — 76805 OB US >/= 14 WKS SNGL FETUS: CPT | Performed by: OBSTETRICS & GYNECOLOGY

## 2023-11-17 NOTE — PROGRESS NOTES
Rafa Ward 387: Ms. Fam Reese was seen today at 26w6d for anatomic survey ultrasound. See ultrasound report under "OB Procedures" tab. My recommendations are as follows: Although encouraging, even a normal-appearing ultrasound cannot exclude all malformations, or the possibility of a genetic syndrome. No further ultrasounds were scheduled at this time. Please refer her back to our office as clinically indicated. We reviewed her history of hypothyroidism, and management in pregnancy. The goal in pregnancy is to maintain a euthyroid maternal state, which is important for maternal health and fetal development. Thyroid function should be assessed at least each trimester using thyroid stimulating hormone (TSH) levels. After medication dose-adjustments, labs should be repeated every 4-6 weeks until euthyroid. Goal TSH levels in pregnancy recommended by the American Thyroid Association are 0.1-2.5 mIU/L (first trimester), 0.2-3.0 mIU/L (second trimester) and 0.3-3.0 mIU/L (third trimester). Levothyroxine dose should be titrated to achieve these trimester-specific ranges. It is typical for women to require a dose increase of levothyroxine during pregnancy, often with a reduction needed postpartum. Although women with overt, uncontrolled hypothyroidism are at risk for adverse pregnancy outcomes (including miscarriage, preeclampsia,  birth, placental abruption, and stillbirth), adequate thyroid hormone replacement in pregnancy minimizes the risk of adverse outcomes. For patients who are well-controlled in pregnancy, no additional fetal surveillance is indicated beyond usual prenatal care.      Please don't hesitate to contact our office with any concerns or questions.    -Jamie Feliz MD

## 2023-11-17 NOTE — LETTER
November 17, 2023     Mila Gonsalez, [address] [facility],[address]  [place]    Patient: Yohana Warren   YOB: 1995   Date of Visit: 11/17/2023       Dear Dr. Kin Schuster: Thank you for referring Yohana Warren to me for evaluation. Below are my notes for this consultation. If you have questions, please do not hesitate to call me. I look forward to following your patient along with you. Sincerely,        Mariza Olsen MD        CC: No Recipients    Mariza Olsen MD  11/17/2023  3:39 PM  Sign when Signing Visit  Rafa Ward 668: Ms. Bartolo Ansari was seen today at 26w6d for anatomic survey ultrasound. See ultrasound report under "OB Procedures" tab. My recommendations are as follows: Although encouraging, even a normal-appearing ultrasound cannot exclude all malformations, or the possibility of a genetic syndrome. No further ultrasounds were scheduled at this time. Please refer her back to our office as clinically indicated. We reviewed her history of hypothyroidism, and management in pregnancy. The goal in pregnancy is to maintain a euthyroid maternal state, which is important for maternal health and fetal development. Thyroid function should be assessed at least each trimester using thyroid stimulating hormone (TSH) levels. After medication dose-adjustments, labs should be repeated every 4-6 weeks until euthyroid. Goal TSH levels in pregnancy recommended by the American Thyroid Association are 0.1-2.5 mIU/L (first trimester), 0.2-3.0 mIU/L (second trimester) and 0.3-3.0 mIU/L (third trimester). Levothyroxine dose should be titrated to achieve these trimester-specific ranges. It is typical for women to require a dose increase of levothyroxine during pregnancy, often with a reduction needed postpartum.   Although women with overt, uncontrolled hypothyroidism are at risk for adverse pregnancy outcomes (including miscarriage, preeclampsia,  birth, placental abruption, and stillbirth), adequate thyroid hormone replacement in pregnancy minimizes the risk of adverse outcomes. For patients who are well-controlled in pregnancy, no additional fetal surveillance is indicated beyond usual prenatal care.      Please don't hesitate to contact our office with any concerns or questions.    -Daniel Lyon MD

## 2023-11-20 ENCOUNTER — CLINICAL SUPPORT (OUTPATIENT)
Age: 28
End: 2023-11-20

## 2023-11-20 DIAGNOSIS — Z32.2 ENCOUNTER FOR CHILDBIRTH INSTRUCTION: Primary | ICD-10-CM

## 2023-12-05 ENCOUNTER — HOSPITAL ENCOUNTER (OUTPATIENT)
Facility: HOSPITAL | Age: 28
Discharge: HOME | End: 2023-12-05
Attending: EMERGENCY MEDICINE | Admitting: OBSTETRICS & GYNECOLOGY
Payer: COMMERCIAL

## 2023-12-05 ENCOUNTER — TELEPHONE (OUTPATIENT)
Dept: OBGYN CLINIC | Facility: MEDICAL CENTER | Age: 28
End: 2023-12-05

## 2023-12-05 ENCOUNTER — APPOINTMENT (EMERGENCY)
Dept: RADIOLOGY | Facility: HOSPITAL | Age: 28
End: 2023-12-05
Attending: EMERGENCY MEDICINE
Payer: COMMERCIAL

## 2023-12-05 VITALS
DIASTOLIC BLOOD PRESSURE: 71 MMHG | RESPIRATION RATE: 18 BRPM | OXYGEN SATURATION: 98 % | BODY MASS INDEX: 34.17 KG/M2 | HEART RATE: 81 BPM | SYSTOLIC BLOOD PRESSURE: 121 MMHG | TEMPERATURE: 98.7 F | WEIGHT: 195.99 LBS

## 2023-12-05 DIAGNOSIS — O26.899 ABDOMINAL PAIN IN PREGNANCY: ICD-10-CM

## 2023-12-05 DIAGNOSIS — J01.90 ACUTE SINUSITIS: Primary | ICD-10-CM

## 2023-12-05 DIAGNOSIS — R06.00 DYSPNEA: ICD-10-CM

## 2023-12-05 DIAGNOSIS — R10.9 ABDOMINAL PAIN IN PREGNANCY: ICD-10-CM

## 2023-12-05 DIAGNOSIS — R42 LIGHTHEADEDNESS: ICD-10-CM

## 2023-12-05 PROBLEM — Z3A.29 29 WEEKS GESTATION OF PREGNANCY: Status: ACTIVE | Noted: 2023-11-17

## 2023-12-05 PROBLEM — O99.513 ASTHMA AFFECTING PREGNANCY IN THIRD TRIMESTER: Status: ACTIVE | Noted: 2023-11-16

## 2023-12-05 PROBLEM — N94.9 ROUND LIGAMENT PAIN: Status: ACTIVE | Noted: 2023-12-05

## 2023-12-05 LAB
ATRIAL RATE: 71 BPM
BILIRUB UR QL STRIP: NEGATIVE
CLARITY UR: CLEAR
COLOR UR: COLORLESS
FLUAV RNA RESP QL NAA+PROBE: NEGATIVE
FLUBV RNA RESP QL NAA+PROBE: NEGATIVE
GLUCOSE UR STRIP-MCNC: NEGATIVE MG/DL
HGB UR QL STRIP.AUTO: NEGATIVE
KETONES UR STRIP-MCNC: NEGATIVE MG/DL
LEUKOCYTE ESTERASE UR QL STRIP: NEGATIVE
NITRITE UR QL STRIP: NEGATIVE
P AXIS: 56 DEGREES
PH UR STRIP.AUTO: 6.5 [PH]
PR INTERVAL: 148 MS
PROT UR STRIP-MCNC: NEGATIVE MG/DL
QRS AXIS: 83 DEGREES
QRSD INTERVAL: 80 MS
QT INTERVAL: 392 MS
QTC INTERVAL: 425 MS
RSV RNA RESP QL NAA+PROBE: NEGATIVE
SARS-COV-2 RNA RESP QL NAA+PROBE: NEGATIVE
SP GR UR STRIP.AUTO: 1.01 (ref 1–1.03)
T WAVE AXIS: 50 DEGREES
UROBILINOGEN UR STRIP-ACNC: <2 MG/DL
VENTRICULAR RATE: 71 BPM

## 2023-12-05 PROCEDURE — 99284 EMERGENCY DEPT VISIT MOD MDM: CPT | Performed by: EMERGENCY MEDICINE

## 2023-12-05 PROCEDURE — 81003 URINALYSIS AUTO W/O SCOPE: CPT

## 2023-12-05 PROCEDURE — 76817 TRANSVAGINAL US OBSTETRIC: CPT

## 2023-12-05 PROCEDURE — 99203 OFFICE O/P NEW LOW 30 MIN: CPT

## 2023-12-05 PROCEDURE — 0241U HB NFCT DS VIR RESP RNA 4 TRGT: CPT | Performed by: EMERGENCY MEDICINE

## 2023-12-05 PROCEDURE — 93005 ELECTROCARDIOGRAM TRACING: CPT

## 2023-12-05 PROCEDURE — 99285 EMERGENCY DEPT VISIT HI MDM: CPT

## 2023-12-05 PROCEDURE — NC001 PR NO CHARGE: Performed by: OBSTETRICS & GYNECOLOGY

## 2023-12-05 PROCEDURE — 71046 X-RAY EXAM CHEST 2 VIEWS: CPT

## 2023-12-05 RX ORDER — AZITHROMYCIN 250 MG/1
TABLET, FILM COATED ORAL
Qty: 6 TABLET | Refills: 0 | Status: SHIPPED | OUTPATIENT
Start: 2023-12-05 | End: 2023-12-09

## 2023-12-05 RX ORDER — FLUTICASONE PROPIONATE 50 MCG
1 SPRAY, SUSPENSION (ML) NASAL DAILY
Qty: 16 G | Refills: 0 | Status: SHIPPED | OUTPATIENT
Start: 2023-12-05

## 2023-12-05 NOTE — PROGRESS NOTES
L&D Triage Note - OB/GYN  Prakash Cardoso 29 y.o. female MRN: [id number]  Unit/Bed#: L&D 329-01 Encounter: [id number]      ASSESSMENT/PLAN  Prakash Cardoso is a 29 y.o.  at 29w3d who presents to L&D for intermittent LLQ and RLQ pains. Changes with positional change. Denies any fevers, chills, dysuria. Early Nov UA showed contaminants, neg nitrites, leuks, blood. Originally presented to ED with c/o viral URI symptoms; ED w/u of RP, EKG, CXR all negative. PTL work-up was negative. UA was neg for nitrites, leuks, blood. Pt to be discharged and follow-up with her next prenatal visit (2023). 1) R/O PTL  -sterile speculum exam: cervix appears closed, no bleeding from cervix or lacerations from insertion of speculum  KOH/Wet Mount:      Infection:   - negative clue cells    - negative hyphae   - negative trichomonads present     Membrane status   - negative ferning   - negative pooling   -cervical length: 4.2cm  -NST: reactive (FHT baseline 130-140, moderate variability, accelerations present, decelerations absent; TOCO no contractions)    2) Lower Abdominal Pain  -UA w/reflex: neg for nitrites, leuks, blood    3)  Discharge instructions  - Patient instructed to call if experiencing worsening contractions, vaginal bleeding, loss of fluid or decreased fetal movement. - Will follow up with OBGYN in office      She is a patient of [facility] (originally at [facility])  D/w Dr. Jax Cm, on call OBGYN Attending Physician  ______________    SUBJECTIVE    FRANK: Estimated Date of Delivery: 24    HPI:  29 y.o. Keke Bueno 29w3d presents with c/o intermittent LLQ and RLQ pains. Pt initially presented to ED d/t congestion, headaches. Denies any fevers/chills, SOB. ED performed RP, EKG, CXR, all were wnl. Sent to L&D d/t intermittent LLQ and RLQ pains. Pt reports that it has been occurring last few weeks, is better with certain positioning.  Has also reported more "internal" pelvic pain when she stands or voids. Denies any dysuria. Recently had UA done; reflex showed contaminants. Otherwise denies any contractions, LOF, VB. Reports good FM. Contractions: denies  Leakage of fluid: denies  Vaginal Bleeding: denies  Fetal movement: present    Her obstetrical history is significant for hypothyroidism on levo 25mcg daily.  U/S done on 2023 showed EFW 65%. ROS:  Constitutional: denies any fevers, chills, headaches  Respiratory: denies any SOB, cough; +congestion  Cardiovascular: denies any chest pains, palpitations, LE edema  Gastrointestinal: denies any current abdominal pains, pelvic pains      OBJECTIVE:  /57 (BP Location: Right arm)   Pulse 95   Temp 98.1 °F (36.7 °C) (Oral)   Resp 18   Wt 88.9 kg (195 lb 15.8 oz)   LMP 2023   SpO2 98%   BMI 34.17 kg/m²   Body mass index is 34.17 kg/m². Labs:   Recent Results (from the past 24 hour(s))   ECG 12 lead    Collection Time: 23  4:18 PM   Result Value Ref Range    Ventricular Rate 71 BPM    Atrial Rate 71 BPM    WI Interval 148 ms    QRSD Interval 80 ms    QT Interval 392 ms    QTC Interval 425 ms    P Axis 56 degrees    QRS Axis 83 degrees    T Wave Axis 50 degrees   FLU/RSV/COVID - if FLU/RSV clinically relevant    Collection Time: 23  4:56 PM    Specimen: Nose; Nares   Result Value Ref Range    SARS-CoV-2 Negative Negative    INFLUENZA A PCR Negative Negative    INFLUENZA B PCR Negative Negative    RSV PCR Negative Negative     Physical Exam  General: Well appearing, no distress, head atraumatic normocephalic  Respiratory: Unlabored breathing, lungs clear to auscultation bilaterally  Cardiovascular: Regular rate, rhythm, no murmurs appreciated  Abdomen: Soft, gravid, nontender   Fundal Height: Appropriate for gestational age. Extremities: Warm and well perfused. Non tender.   Speculum Exam: cervix appears closed, no bleeding from cervix or lacerations from insertion of speculum  KOH/Wet Mount:      Infection:   - negative clue cells    - negative hyphae   - negative trichomonads present     Membrane status   - negative ferning   - negative pooling   FHT: baseline 130-140, moderate variability, accelerations present, decelerations absent       TOCO: no contractions       IMAGING:       TVUS   Cervical length: shortest 4.2cm     Noris Montoya DO  OB/GYN   12/5/2023  6:39 PM

## 2023-12-05 NOTE — TELEPHONE ENCOUNTER
Patient called with SOB, Dizziness and being light headed. Talked with Provider in office and advised patient to go to [facility] to be evaluated.

## 2023-12-05 NOTE — ED PROVIDER NOTES
History  Chief Complaint   Patient presents with    Shortness of Breath     Patient arrives with shortness of breath and dizziness, patient is 29 weeks pregnant, patient called her ob and was instructed to come to ed, ob called and to be seen in ed     29-year-old female 29 weeks gestation presents for evaluation of multiple planes over the past week. Patient states that she had nasal congestion with blood-tinged rhinorrhea, postnasal drip, right ear pressure. The symptoms are constant unchanged with modifying factors. Associate with intermittently feeling lightheaded and short of breath and stating that she is unable to breathe through her right nostril when this occurs. She currently denies feeling short of breath or lightheaded. There is no headache, neck pain or neck stiffness, chest pain, cough, fevers, chills, focal neurodeficit weakness in the lower extremity or calf pain, risk factors for DVT or pulmonary embolism. Patient reports normal fetal movement with a states that she has an intermittent sharp left lower quadrant abdominal pain without vaginal bleeding or discharge. History provided by:  Patient  Shortness of Breath  Associated symptoms: ear pain and sore throat    Associated symptoms: no abdominal pain, no chest pain, no fever, no headaches, no neck pain, no rash, no vomiting and no wheezing        Prior to Admission Medications   Prescriptions Last Dose Informant Patient Reported? Taking?    MAGNESIUM PO   Yes No   Sig: Take 1 tablet by mouth daily   Prenatal Vit-Fe Fumarate-FA (PRENATAL VITAMIN PO)   Yes No   Sig: Take by mouth   albuterol (PROVENTIL HFA,VENTOLIN HFA) 90 mcg/act inhaler   No No   Sig: Inhale 2 puffs every 6 (six) hours as needed for wheezing or shortness of breath   ferrous sulfate 324 (65 Fe) mg   No No   Sig: Take 1 tablet (324 mg total) by mouth every other day   levothyroxine 25 mcg tablet   No No   Sig: Take 1 tablet (25 mcg total) by mouth daily Facility-Administered Medications: None       Past Medical History:   Diagnosis Date    Anxiety     Asthma     Disease of thyroid gland     Enteritis 12/10/2018    Migraines     Pancytopenia ([address]) 12/12/2018       Past Surgical History:   Procedure Laterality Date    EXTERNAL EAR SURGERY         Family History   Problem Relation Age of Onset    Other Mother         Hysterectomy    Anemia Mother     Migraines Mother     Hypertension Father     No Known Problems Sister     Diabetes Maternal Grandmother     Heart disease Maternal Grandmother     Hypertension Maternal Grandmother     Hyperlipidemia Maternal Grandmother     Alzheimer's disease Maternal Grandmother     Alzheimer's disease Maternal Grandfather     Cataracts Maternal Grandfather     Stroke Paternal Grandmother     Thyroid disease Paternal Grandmother     Cancer Other         possibly uterine    Breast cancer Neg Hx     Colon cancer Neg Hx     Ovarian cancer Neg Hx      I have reviewed and agree with the history as documented. E-Cigarette/Vaping    E-Cigarette Use Never User      E-Cigarette/Vaping Substances    Nicotine No     THC No     CBD No     Flavoring No     Other No     Unknown No      Social History     Tobacco Use    Smoking status: Never    Smokeless tobacco: Never   Vaping Use    Vaping Use: Never used   Substance Use Topics    Alcohol use: Not Currently     Comment: social     Drug use: No       Review of Systems   Constitutional:  Negative for activity change, appetite change, fatigue and fever. HENT:  Positive for congestion, ear pain, rhinorrhea, sinus pressure, sinus pain and sore throat. Negative for dental problem, ear discharge, facial swelling, nosebleeds, sneezing, tinnitus, trouble swallowing and voice change. Eyes:  Negative for pain and redness. Respiratory:  Positive for shortness of breath. Negative for chest tightness and wheezing. Cardiovascular:  Negative for chest pain and palpitations.    Gastrointestinal: Negative for abdominal pain, blood in stool, constipation, diarrhea, nausea and vomiting. Endocrine: Negative for cold intolerance and heat intolerance. Genitourinary:  Negative for dysuria, frequency and hematuria. Musculoskeletal:  Negative for arthralgias, myalgias, neck pain and neck stiffness. Skin:  Negative for color change, pallor and rash. Neurological:  Positive for light-headedness. Negative for dizziness, facial asymmetry, weakness, numbness and headaches. Hematological:  Does not bruise/bleed easily. Psychiatric/Behavioral:  Negative for agitation, hallucinations and suicidal ideas. Physical Exam  Physical Exam  Vitals and nursing note reviewed. Constitutional:       Appearance: She is well-developed. HENT:      Right Ear: Tympanic membrane, ear canal and external ear normal.      Left Ear: Tympanic membrane, ear canal and external ear normal.      Nose: Congestion and rhinorrhea present. Comments: Ttp over frontal and maxillary sinuses     Mouth/Throat:      Pharynx: Posterior oropharyngeal erythema present. No oropharyngeal exudate. Comments: Post nasal drip    Eyes:      General:         Right eye: No discharge. Left eye: No discharge. Cardiovascular:      Rate and Rhythm: Normal rate and regular rhythm. Pulmonary:      Effort: Pulmonary effort is normal.      Breath sounds: Normal breath sounds. Abdominal:      General: There is no distension. Palpations: There is no mass. Tenderness: There is no abdominal tenderness. Hernia: No hernia is present. Musculoskeletal:      Cervical back: Normal range of motion and neck supple. No rigidity or tenderness. Right lower leg: No edema. Left lower leg: No edema. Lymphadenopathy:      Cervical: Cervical adenopathy present. Skin:     Capillary Refill: Capillary refill takes less than 2 seconds. Findings: No rash. Neurological:      General: No focal deficit present. Mental Status: She is alert and oriented to person, place, and time. Vital Signs  ED Triage Vitals [23 1523]   Temperature Pulse Respirations Blood Pressure SpO2   98.1 °F (36.7 °C) 95 18 124/57 98 %      Temp Source Heart Rate Source Patient Position - Orthostatic VS BP Location FiO2 (%)   Oral Monitor Sitting Right arm --      Pain Score       --           Vitals:    23 1523   BP: 124/57   Pulse: 95   Patient Position - Orthostatic VS: Sitting         Visual Acuity      ED Medications  Medications - No data to display    Diagnostic Studies  Results Reviewed       Procedure Component Value Units Date/Time    FLU/RSV/COVID - if FLU/RSV clinically relevant [[id number]] Collected: 23 165    Lab Status: In process Specimen: Nares from Nose Updated: 23                   XR chest 2 views   ED Interpretation by Danuta Kumar MD ([id number])   Primary reviewed; no acute abnormality                 Procedures  ECG 12 Lead Documentation Only    Date/Time: 2023 4:25 PM    Performed by: Danuta Kumar MD  Authorized by: Danuta Kumar MD    ECG reviewed by me, the ED Provider: yes    Patient location:  ED  Rate:     ECG rate:  71    ECG rate assessment: normal    Rhythm:     Rhythm: sinus rhythm    Ectopy:     Ectopy: none    QRS:     QRS axis:  Normal    QRS intervals:  Normal  Conduction:     Conduction: normal    ST segments:     ST segments:  Normal  T waves:     T waves: normal             ED Course  ED Course as of 23 1720   e Dec 05, 2023   171 X-ray negative. Will treat for sinusitis, dispo to OB for rule out  labor                               SBIRT 22yo+      Flowsheet Row Most Recent Value   Initial Alcohol Screen: US AUDIT-C     1. How often do you have a drink containing alcohol? 0 Filed at: 2023 6669   2. How many drinks containing alcohol do you have on a typical day you are drinking? 0 Filed at: 2023 1377   3b.  FEMALE Any Age, or MALE 65+: How often do you have 4 or more drinks on one occassion? 0 Filed at: 12/05/2023 1558   Audit-C Score 0 Filed at: 12/05/2023 1558   TORREY: How many times in the past year have you. .. Used an illegal drug or used a prescription medication for non-medical reasons? Never Filed at: 12/05/2023 1558                      Medical Decision Making  Nasal congestion, right ear pressure, postnasal drip, intermittent lightheadedness and shortness of breath consistent with acute sinusitis. Possibly secondary to pneumonia, dysrhythmia. History exam findings suggest preeclampsia, pulmonary embolism. Will do EKG, chest x-ray, COVID flu RSV, symptomatic treatment. Amount and/or Complexity of Data Reviewed  Radiology: ordered and independent interpretation performed. Risk  Prescription drug management. Disposition  Final diagnoses:   Acute sinusitis   Lightheadedness   Dyspnea   Abdominal pain in pregnancy     Time reflects when diagnosis was documented in both MDM as applicable and the Disposition within this note       Time User Action Codes Description Comment    12/5/2023  5:19 PM Mariana Haney Add [J01.90] Acute sinusitis     12/5/2023  5:19 PM Robina GODOY Add [R42] Lightheadedness     12/5/2023  5:19 PM Mack Ann Add [R06.00] Dyspnea     12/5/2023  5:19 PM Mack Ann Add [O26.899,  R10.9] Abdominal pain in pregnancy           ED Disposition       ED Disposition   Send to L&D After Provider Eval    Condition   --    Date/Time   Tue Dec 5, 2023 7604    Comment   --             Follow-up Information       Follow up With Specialties Details Why Contact Info    Larry Hart DO Family Medicine Schedule an appointment as soon as possible for a visit in 2 days  [address]   [address] [facility]  [phone]              Patient's Medications   Discharge Prescriptions    AZITHROMYCIN (ZITHROMAX Z-OCTAVIANO) 250 MG TABLET    Take 2 tabs x 1 day, then 1 tab x 4 days       Start Date: 12/5/2023 End Date: 12/9/2023       Order Dose: --       Quantity: 6 tablet    Refills: 0    FLUTICASONE (FLONASE) 50 MCG/ACT NASAL SPRAY    1 spray into each nostril daily       Start Date: 12/5/2023 End Date: --       Order Dose: 1 spray       Quantity: 16 g    Refills: 0       No discharge procedures on file.     PDMP Review       None            ED Provider  Electronically Signed by             Silvio Britton MD  12/05/23 3092

## 2023-12-06 NOTE — PROCEDURES
Ankit Mcnamara, huyen  at 29w3d with an FRANK of 2024, Date entered prior to episode creation, was seen at [address] for the following procedure(s): $Procedure Type: US - Transvaginal]                   Ultrasound Other  Fetal Presentation: Vertex  Cervical Length: 4.2  Funnel: No  Debris: No  Placenta Previa: No  Vasa Previa: No         Ultrasound Probe Disinfection    A transvaginal ultrasound was performed.    Prior to use, disinfection was performed with High Level Disinfection Process (Trophon)    Kelly Barnett DO  23  11:08 PM

## 2023-12-07 ENCOUNTER — ROUTINE PRENATAL (OUTPATIENT)
Dept: OBGYN CLINIC | Facility: MEDICAL CENTER | Age: 28
End: 2023-12-07
Payer: COMMERCIAL

## 2023-12-07 VITALS — DIASTOLIC BLOOD PRESSURE: 62 MMHG | SYSTOLIC BLOOD PRESSURE: 110 MMHG | BODY MASS INDEX: 34.19 KG/M2 | WEIGHT: 196.1 LBS

## 2023-12-07 DIAGNOSIS — O99.280 THYROID DISEASE AFFECTING PREGNANCY: Primary | ICD-10-CM

## 2023-12-07 DIAGNOSIS — O99.513 ASTHMA AFFECTING PREGNANCY IN THIRD TRIMESTER: ICD-10-CM

## 2023-12-07 DIAGNOSIS — E07.9 THYROID DISEASE AFFECTING PREGNANCY: Primary | ICD-10-CM

## 2023-12-07 DIAGNOSIS — Z3A.29 29 WEEKS GESTATION OF PREGNANCY: ICD-10-CM

## 2023-12-07 DIAGNOSIS — Z23 ENCOUNTER FOR IMMUNIZATION: ICD-10-CM

## 2023-12-07 DIAGNOSIS — J45.909 ASTHMA AFFECTING PREGNANCY IN THIRD TRIMESTER: ICD-10-CM

## 2023-12-07 PROCEDURE — 99215 OFFICE O/P EST HI 40 MIN: CPT | Performed by: NURSE PRACTITIONER

## 2023-12-07 PROCEDURE — 90471 IMMUNIZATION ADMIN: CPT

## 2023-12-07 PROCEDURE — 90715 TDAP VACCINE 7 YRS/> IM: CPT

## 2023-12-07 NOTE — PROGRESS NOTES
Denies loss of fluid, vaginal bleeding and abdominal pain. Confirms frequent fetal movement. Doing fetal kick counts. Tolerating prenatal vitamin, levothyroxine and iron well. Was recently seen in the emergency room 23 with complaints of dizziness and lightheadedness. Was evaluated and discharged with diagnosis of sinus infection prescribed azithromycin and Flonase patient has not started medication states symptoms have improved. Is not certain she is going to use antibiotics. Tdap vaccine recommendations reviewed, patient is agreeable to administration at today's visit. Denies questions or concerns at today's visit  BP: 110/62  Plan  -Continue prenatal vitamins daily  -Continue fetal kick counts daily  -Tdap vaccine today  -Hypothyroid in pregnancy continue levothyroxine as ordered  -Anemia in pregnancy continue iron every other day on an empty stomach with OJ  -28-week folder provided and reviewed  -Reviewed with patient agreeable with supportive management of sinus infection aware to call office if symptoms worsen. RSV, flu and COVID testing are negative. Patient denies fever, chills. Reviewed options for supportive management  -Common discomforts of pregnancy and precautions including  labor reviewed. Signs and symptoms to report reviewed.   RTO 2 weeks

## 2023-12-21 ENCOUNTER — APPOINTMENT (OUTPATIENT)
Dept: LAB | Facility: HOSPITAL | Age: 28
End: 2023-12-21
Payer: COMMERCIAL

## 2023-12-21 ENCOUNTER — ROUTINE PRENATAL (OUTPATIENT)
Dept: OBGYN CLINIC | Facility: MEDICAL CENTER | Age: 28
End: 2023-12-21
Payer: COMMERCIAL

## 2023-12-21 VITALS — SYSTOLIC BLOOD PRESSURE: 142 MMHG | BODY MASS INDEX: 34.72 KG/M2 | DIASTOLIC BLOOD PRESSURE: 82 MMHG | WEIGHT: 199.1 LBS

## 2023-12-21 DIAGNOSIS — O99.513 ASTHMA AFFECTING PREGNANCY IN THIRD TRIMESTER: ICD-10-CM

## 2023-12-21 DIAGNOSIS — O16.3 ELEVATED BLOOD PRESSURE AFFECTING PREGNANCY IN THIRD TRIMESTER, ANTEPARTUM: ICD-10-CM

## 2023-12-21 DIAGNOSIS — E07.9 THYROID DISEASE AFFECTING PREGNANCY: ICD-10-CM

## 2023-12-21 DIAGNOSIS — O16.3 ELEVATED BLOOD PRESSURE AFFECTING PREGNANCY IN THIRD TRIMESTER, ANTEPARTUM: Primary | ICD-10-CM

## 2023-12-21 DIAGNOSIS — Z3A.31 31 WEEKS GESTATION OF PREGNANCY: ICD-10-CM

## 2023-12-21 DIAGNOSIS — O99.280 THYROID DISEASE AFFECTING PREGNANCY: ICD-10-CM

## 2023-12-21 DIAGNOSIS — J45.909 ASTHMA AFFECTING PREGNANCY IN THIRD TRIMESTER: ICD-10-CM

## 2023-12-21 LAB
ALBUMIN SERPL BCP-MCNC: 3.6 G/DL (ref 3.5–5)
ALP SERPL-CCNC: 85 U/L (ref 34–104)
ALT SERPL W P-5'-P-CCNC: 10 U/L (ref 7–52)
ANION GAP SERPL CALCULATED.3IONS-SCNC: 8 MMOL/L
AST SERPL W P-5'-P-CCNC: 17 U/L (ref 13–39)
BILIRUB SERPL-MCNC: 0.55 MG/DL (ref 0.2–1)
BUN SERPL-MCNC: 8 MG/DL (ref 5–25)
CALCIUM SERPL-MCNC: 8.7 MG/DL (ref 8.4–10.2)
CHLORIDE SERPL-SCNC: 103 MMOL/L (ref 96–108)
CO2 SERPL-SCNC: 24 MMOL/L (ref 21–32)
CREAT SERPL-MCNC: 0.49 MG/DL (ref 0.6–1.3)
CREAT UR-MCNC: 40.7 MG/DL
ERYTHROCYTE [DISTWIDTH] IN BLOOD BY AUTOMATED COUNT: 13.3 % (ref 11.6–15.1)
GFR SERPL CREATININE-BSD FRML MDRD: 133 ML/MIN/1.73SQ M
GLUCOSE SERPL-MCNC: 108 MG/DL (ref 65–140)
HCT VFR BLD AUTO: 35.8 % (ref 34.8–46.1)
HGB BLD-MCNC: 11.1 G/DL (ref 11.5–15.4)
MCH RBC QN AUTO: 27.3 PG (ref 26.8–34.3)
MCHC RBC AUTO-ENTMCNC: 31 G/DL (ref 31.4–37.4)
MCV RBC AUTO: 88 FL (ref 82–98)
PLATELET # BLD AUTO: 190 THOUSANDS/UL (ref 149–390)
PMV BLD AUTO: 10.8 FL (ref 8.9–12.7)
POTASSIUM SERPL-SCNC: 4 MMOL/L (ref 3.5–5.3)
PROT SERPL-MCNC: 6.2 G/DL (ref 6.4–8.4)
PROT UR-MCNC: 7 MG/DL
PROT/CREAT UR: 0.17 MG/G{CREAT} (ref 0–0.1)
RBC # BLD AUTO: 4.06 MILLION/UL (ref 3.81–5.12)
SODIUM SERPL-SCNC: 135 MMOL/L (ref 135–147)
WBC # BLD AUTO: 8.8 THOUSAND/UL (ref 4.31–10.16)

## 2023-12-21 PROCEDURE — 82570 ASSAY OF URINE CREATININE: CPT | Performed by: STUDENT IN AN ORGANIZED HEALTH CARE EDUCATION/TRAINING PROGRAM

## 2023-12-21 PROCEDURE — 85027 COMPLETE CBC AUTOMATED: CPT

## 2023-12-21 PROCEDURE — 80053 COMPREHEN METABOLIC PANEL: CPT

## 2023-12-21 PROCEDURE — 99214 OFFICE O/P EST MOD 30 MIN: CPT | Performed by: STUDENT IN AN ORGANIZED HEALTH CARE EDUCATION/TRAINING PROGRAM

## 2023-12-21 PROCEDURE — 36415 COLL VENOUS BLD VENIPUNCTURE: CPT

## 2023-12-21 PROCEDURE — 84156 ASSAY OF PROTEIN URINE: CPT | Performed by: STUDENT IN AN ORGANIZED HEALTH CARE EDUCATION/TRAINING PROGRAM

## 2023-12-21 NOTE — ASSESSMENT & PLAN NOTE
- First pressure was elevated in the ofice 142/82, normal on recheck 128/75.  - She denies headache, visual disturbances, dyspnea, chest pain, epigastric pain, right upper quadrant pain, or generalized edema.  - CBC, CMP, urine p/c ordered; third trimester growth ordered  - Precautions given to call or present if she has signs or symptoms of pre-e, return to office in 1 week for BP check

## 2023-12-21 NOTE — PROGRESS NOTES
Routine Prenatal Visit  [facility] [address], [place], [place]    Assessment/Plan:  Mala is a 28 y.o. year old  at 31w5d who presents for routine prenatal visit.     1. Elevated blood pressure affecting pregnancy in third trimester, antepartum  Assessment & Plan:  - First pressure was elevated in the ofice 142/82, normal on recheck 128/75.  - She denies headache, visual disturbances, dyspnea, chest pain, epigastric pain, right upper quadrant pain, or generalized edema.  - CBC, CMP, urine p/c ordered; third trimester growth ordered  - Precautions given to call or present if she has signs or symptoms of pre-e, return to office in 1 week for BP check    Orders:  -     CBC and Platelet; Future  -     Comprehensive metabolic panel; Future  -     Protein / creatinine ratio, urine  -     Ambulatory Referral to Maternal Fetal Medicine; Future; Expected date: 2023    2. Thyroid disease affecting pregnancy    3. Asthma affecting pregnancy in third trimester    4. 31 weeks gestation of pregnancy          Subjective:     CC: Prenatal care    Mala Jaramillo is a 28 y.o.  female who presents for routine prenatal care at 31w5d.  Pregnancy ROS: Denies leakage of fluid, pelvic pain, or vaginal bleeding.  Reports normal fetal movement.    The following portions of the patient's history were reviewed and updated as appropriate: allergies, current medications, past family history, past medical history, obstetric history, gynecologic history, past social history, past surgical history and problem list.      Objective:  /82   Wt 90.3 kg (199 lb 1.6 oz)   LMP 2023   BMI 34.72 kg/m²   Pregravid Weight/BMI: Pregravid weight not on file (BMI Could not be calculated)  Current Weight: 90.3 kg (199 lb 1.6 oz)   Total Weight Gain: Not found.   Pre- Vitals      Flowsheet Row Most Recent Value   Prenatal Assessment    Fetal Heart Rate 147   Movement Present   Prenatal  Vitals    Blood Pressure 142/82   Weight - Scale 90.3 kg (199 lb 1.6 oz)   Urine Albumin/Glucose    Dilation/Effacement/Station    Vaginal Drainage    Draining Fluid No   Edema    LLE Edema None   RLE Edema None   Facial Edema None             General: Well appearing, no distress  Respiratory: Unlabored breathing  Cardiovascular: Regular rate.  Abdomen: Soft, gravid, nontender  Fundal Height: Appropriate for gestational age.  Extremities: Warm and well perfused.  Non tender.

## 2023-12-26 ENCOUNTER — CLINICAL SUPPORT (OUTPATIENT)
Dept: OBGYN CLINIC | Facility: MEDICAL CENTER | Age: 28
End: 2023-12-26

## 2023-12-26 VITALS — WEIGHT: 198.2 LBS | SYSTOLIC BLOOD PRESSURE: 104 MMHG | DIASTOLIC BLOOD PRESSURE: 68 MMHG | BODY MASS INDEX: 34.56 KG/M2

## 2023-12-26 DIAGNOSIS — Z01.30 BP CHECK: Primary | ICD-10-CM

## 2023-12-26 NOTE — PROGRESS NOTES
Patient present for BP check after elevated reading at her OB appointment last week. Patient denies any HA, visual disturbances or RUQ pain. Pre-e labs normal. BP at today's visit 104/68. Precautions reviewed with patient. Advised to call office with any concerns or questions. Patient has next appointment scheduled for Wednesday 1/3.

## 2023-12-31 ENCOUNTER — NURSE TRIAGE (OUTPATIENT)
Dept: OTHER | Facility: OTHER | Age: 28
End: 2023-12-31

## 2023-12-31 NOTE — TELEPHONE ENCOUNTER
"Reason for Disposition  • [1] COVID-19 diagnosed by positive lab test (e.g., PCR, rapid self-test kit) AND [2] mild symptoms (e.g., cough, fever, others) AND [3] no complications or SOB    Answer Assessment - Initial Assessment Questions  1. COVID-19 DIAGNOSIS: \"Who made your COVID-19 diagnosis?\" \"Was it confirmed by a positive lab test or self-test?\" If not diagnosed by a doctor (or NP/PA), ask \"Are there lots of cases (community spread) where you live?\" Note: See [facility] health department website, if unsure.      Rapid test came up positive today.     2. COVID-19 EXPOSURE: \"Was there any known exposure to COVID before the symptoms began?\" CDC Definition of close contact: within 6 feet (2 meters) for a total of 15 minutes or more over a 24-hour period.       Unknown    3. ONSET: \"When did the COVID-19 symptoms start?\"       Since Thursday night.     4. WORST SYMPTOM: \"What is your worst symptom?\" (e.g., cough, fever, shortness of breath, muscle aches)      Lots of nasal congestion.     5. COUGH: \"Do you have a cough?\" If Yes, ask: \"How bad is the cough?\"        Started coughing on Saturday 12/30. Productive cough- mucus is clear.     6. FEVER: \"Do you have a fever?\" If Yes, ask: \"What is your temperature, how was it measured, and when did it start?\"      Denies.     7. RESPIRATORY STATUS: \"Describe your breathing?\" (e.g., shortness of breath, wheezing, unable to speak)       No SOB. No chest pain.     8. BETTER-SAME-WORSE: \"Are you getting better, staying the same or getting worse compared to yesterday?\"  If getting worse, ask, \"In what way?\"      Worse     9. HIGH RISK DISEASE: \"Do you have any chronic medical problems?\" (e.g., asthma, heart or lung disease, weak immune system, obesity, etc.)      Asthma- uses albuterol inhaler PRN    10. VACCINE: \"Have you had the COVID-19 vaccine?\" If Yes, ask: \"Which one, how many shots, when did you get it?\"        Yes    11. BOOSTER: \"Have you received your COVID-19 booster?\" " "If Yes, ask: \"Which one and when did you get it?\"        Yes    12. PREGNANCY: \"Is there any chance you are pregnant?\" \"When was your last menstrual period?\"        33 wks gestation.     13. OTHER SYMPTOMS: \"Do you have any other symptoms?\"  (e.g., chills, fatigue, headache, loss of smell or taste, muscle pain, sore throat)         Mild back pain and pelvic pressure- on and off- no pain now. No leakage of fluid or vaginal bleeding. Baby moving normally.    Protocols used: Coronavirus (COVID-19) Diagnosed or Suspected-ADULT-    "

## 2023-12-31 NOTE — TELEPHONE ENCOUNTER
"Regarding: pregnant with covid  ----- Message from Kirsten Parmar sent at 12/31/2023  9:47 AM EST -----  \"I am 33 weeks pregnant and I just tested positive for covid. Is there something I should be doing?\"    "

## 2024-01-01 ENCOUNTER — NURSE TRIAGE (OUTPATIENT)
Dept: OTHER | Facility: OTHER | Age: 29
End: 2024-01-01

## 2024-01-01 ENCOUNTER — HOSPITAL ENCOUNTER (EMERGENCY)
Facility: HOSPITAL | Age: 29
Discharge: HOME | End: 2024-01-01
Attending: EMERGENCY MEDICINE
Payer: COMMERCIAL

## 2024-01-01 ENCOUNTER — APPOINTMENT (EMERGENCY)
Dept: RADIOLOGY | Facility: HOSPITAL | Age: 29
End: 2024-01-01
Payer: COMMERCIAL

## 2024-01-01 VITALS
SYSTOLIC BLOOD PRESSURE: 114 MMHG | HEART RATE: 82 BPM | DIASTOLIC BLOOD PRESSURE: 69 MMHG | OXYGEN SATURATION: 97 % | WEIGHT: 201.28 LBS | BODY MASS INDEX: 35.1 KG/M2 | RESPIRATION RATE: 16 BRPM | TEMPERATURE: 98.7 F

## 2024-01-01 DIAGNOSIS — R05.9 COUGH: Primary | ICD-10-CM

## 2024-01-01 DIAGNOSIS — U07.1 COVID-19: ICD-10-CM

## 2024-01-01 LAB
ATRIAL RATE: 96 BPM
BILIRUB UR QL STRIP: NEGATIVE
CLARITY UR: CLEAR
COLOR UR: YELLOW
FLUAV RNA RESP QL NAA+PROBE: NEGATIVE
FLUBV RNA RESP QL NAA+PROBE: NEGATIVE
GLUCOSE UR STRIP-MCNC: NEGATIVE MG/DL
HGB UR QL STRIP.AUTO: NEGATIVE
KETONES UR STRIP-MCNC: NEGATIVE MG/DL
LEUKOCYTE ESTERASE UR QL STRIP: NEGATIVE
NITRITE UR QL STRIP: NEGATIVE
P AXIS: 84 DEGREES
PH UR STRIP.AUTO: 6.5 [PH] (ref 4.5–8)
PR INTERVAL: 110 MS
PROT UR STRIP-MCNC: NEGATIVE MG/DL
QRS AXIS: 77 DEGREES
QRSD INTERVAL: 68 MS
QT INTERVAL: 342 MS
QTC INTERVAL: 432 MS
RSV RNA RESP QL NAA+PROBE: NEGATIVE
SARS-COV-2 RNA RESP QL NAA+PROBE: POSITIVE
SP GR UR STRIP.AUTO: 1.01 (ref 1–1.03)
T WAVE AXIS: 29 DEGREES
UROBILINOGEN UR QL STRIP.AUTO: 0.2 E.U./DL
VENTRICULAR RATE: 96 BPM

## 2024-01-01 PROCEDURE — 87086 URINE CULTURE/COLONY COUNT: CPT

## 2024-01-01 PROCEDURE — 99285 EMERGENCY DEPT VISIT HI MDM: CPT | Performed by: EMERGENCY MEDICINE

## 2024-01-01 PROCEDURE — 99284 EMERGENCY DEPT VISIT MOD MDM: CPT

## 2024-01-01 PROCEDURE — 71045 X-RAY EXAM CHEST 1 VIEW: CPT

## 2024-01-01 PROCEDURE — 0241U HB NFCT DS VIR RESP RNA 4 TRGT: CPT | Performed by: EMERGENCY MEDICINE

## 2024-01-01 PROCEDURE — 93005 ELECTROCARDIOGRAM TRACING: CPT

## 2024-01-01 PROCEDURE — 81003 URINALYSIS AUTO W/O SCOPE: CPT

## 2024-01-01 RX ORDER — NIRMATRELVIR AND RITONAVIR 300-100 MG
3 KIT ORAL 2 TIMES DAILY
Qty: 30 TABLET | Refills: 0 | Status: SHIPPED | OUTPATIENT
Start: 2024-01-01 | End: 2024-01-06

## 2024-01-01 NOTE — Clinical Note
Mala Jaramillo was seen and treated in our emergency department on 1/1/2024.    No restrictions            Diagnosis:     Mala  may return to work on return date.    She may return on this date: 01/04/2024         If you have any questions or concerns, please don't hesitate to call.      Juan Holden MD    ______________________________           _______________          _______________  Hospital Representative                              Date                                Time

## 2024-01-01 NOTE — DISCHARGE INSTRUCTIONS
Take acetaminophen for discomfort.    The obstetricians have recommended that COVID be treated with antivirals in the setting of pregnacy. A prescription for Paxlovid has been prescribed for this.    Call your OB in the morning to let them know you were in the ER.    Return to the ER if you develop abdominal pain, vaginal bleeding or loss of fluids.

## 2024-01-01 NOTE — ED PROVIDER NOTES
History  Chief Complaint   Patient presents with    Cough     Pt reports took at home covid test which was positive. C/o CP, cough and congestion. Pt 33 weeks pregnant      27 YO female, 33 weeks pregnant, presents with cough, fatigue, headache, congestion. States this has been present ~3 days, this has been the worst day since onset. She did check a COVID test which was positive. She denies any concerns regarding the pregnancy, has no abdominal pain, no vaginal bleeding, loss of fluid, she feels baby moving. Pt denies F/C/N/V/D/C, no dysuria, burning on urination or blood in urine.       History provided by:  Patient   used: No        Prior to Admission Medications   Prescriptions Last Dose Informant Patient Reported? Taking?   MAGNESIUM PO   Yes No   Sig: Take 1 tablet by mouth daily   Prenatal Vit-Fe Fumarate-FA (PRENATAL VITAMIN PO)   Yes No   Sig: Take by mouth   albuterol (PROVENTIL HFA,VENTOLIN HFA) 90 mcg/act inhaler   No No   Sig: Inhale 2 puffs every 6 (six) hours as needed for wheezing or shortness of breath   ferrous sulfate 324 (65 Fe) mg   No No   Sig: Take 1 tablet (324 mg total) by mouth every other day   fluticasone (FLONASE) 50 mcg/act nasal spray   No No   Si spray into each nostril daily   Patient not taking: Reported on 2023   levothyroxine 25 mcg tablet   No No   Sig: Take 1 tablet (25 mcg total) by mouth daily      Facility-Administered Medications: None       Past Medical History:   Diagnosis Date    Anxiety     Asthma     Disease of thyroid gland     Enteritis 12/10/2018    Migraines     Pancytopenia (HCC) 2018       Past Surgical History:   Procedure Laterality Date    EXTERNAL EAR SURGERY         Family History   Problem Relation Age of Onset    Other Mother         Hysterectomy    Anemia Mother     Migraines Mother     Hypertension Father     No Known Problems Sister     Diabetes Maternal Grandmother     Heart disease Maternal Grandmother      Hypertension Maternal Grandmother     Hyperlipidemia Maternal Grandmother     Alzheimer's disease Maternal Grandmother     Alzheimer's disease Maternal Grandfather     Cataracts Maternal Grandfather     Stroke Paternal Grandmother     Thyroid disease Paternal Grandmother     Cancer Other         possibly uterine    Breast cancer Neg Hx     Colon cancer Neg Hx     Ovarian cancer Neg Hx      I have reviewed and agree with the history as documented.    E-Cigarette/Vaping    E-Cigarette Use Never User      E-Cigarette/Vaping Substances    Nicotine No     THC No     CBD No     Flavoring No     Other No     Unknown No      Social History     Tobacco Use    Smoking status: Never    Smokeless tobacco: Never   Vaping Use    Vaping status: Never Used   Substance Use Topics    Alcohol use: Not Currently     Comment: social     Drug use: No       Review of Systems   Constitutional:  Positive for chills and fatigue.   HENT:  Positive for congestion. Negative for dental problem.    Eyes:  Negative for visual disturbance.   Respiratory:  Positive for cough and shortness of breath.    Cardiovascular:  Positive for chest pain.   Gastrointestinal:  Negative for abdominal pain, diarrhea and vomiting.   Genitourinary:  Negative for dysuria and frequency.   Musculoskeletal:  Positive for myalgias. Negative for arthralgias.   Skin:  Negative for rash.   Neurological:  Negative for dizziness, weakness and light-headedness.   Psychiatric/Behavioral:  Negative for agitation, behavioral problems and confusion.    All other systems reviewed and are negative.      Physical Exam  Physical Exam  Vitals and nursing note reviewed.   Constitutional:       Appearance: Normal appearance. She is well-developed.   HENT:      Head: Normocephalic and atraumatic.   Eyes:      Extraocular Movements: Extraocular movements intact.      Conjunctiva/sclera: Conjunctivae normal.   Cardiovascular:      Rate and Rhythm: Normal rate and regular rhythm.       Pulses: Normal pulses.      Heart sounds: Normal heart sounds.   Pulmonary:      Effort: Pulmonary effort is normal.      Breath sounds: Normal breath sounds.   Abdominal:      General: There is no distension.      Palpations: Abdomen is soft.   Musculoskeletal:         General: Normal range of motion.      Cervical back: Normal range of motion.   Skin:     General: Skin is warm and dry.      Findings: No rash.   Neurological:      General: No focal deficit present.      Mental Status: She is alert and oriented to person, place, and time.      Cranial Nerves: No cranial nerve deficit.   Psychiatric:         Mood and Affect: Mood normal.         Behavior: Behavior normal.         Thought Content: Thought content normal.         Vital Signs  ED Triage Vitals [01/01/24 1227]   Temperature Pulse Respirations Blood Pressure SpO2   98.7 °F (37.1 °C) (!) 111 19 130/61 95 %      Temp Source Heart Rate Source Patient Position - Orthostatic VS BP Location FiO2 (%)   Oral Monitor Sitting Right arm --      Pain Score       6           Vitals:    01/01/24 1227 01/01/24 1430   BP: 130/61 114/69   Pulse: (!) 111 82   Patient Position - Orthostatic VS: Sitting Sitting         Visual Acuity      ED Medications  Medications - No data to display    Diagnostic Studies  Results Reviewed       Procedure Component Value Units Date/Time    FLU/RSV/COVID - if FLU/RSV clinically relevant [[id number]]  (Abnormal) Collected: 01/01/24 1417    Lab Status: Final result Specimen: Nares from Nose Updated: 01/01/24 1504     SARS-CoV-2 Positive     INFLUENZA A PCR Negative     INFLUENZA B PCR Negative     RSV PCR Negative    Narrative:      FOR PEDIATRIC PATIENTS - copy/paste COVID Guidelines URL to browser: [email or web address]    SARS-CoV-2 assay is a Nucleic Acid Amplification assay intended for the  qualitative detection of nucleic acid from SARS-CoV-2 in nasopharyngeal  swabs. Results are for  the presumptive identification of SARS-CoV-2 RNA.    Positive results are indicative of infection with SARS-CoV-2, the virus  causing COVID-19, but do not rule out bacterial infection or co-infection  with other viruses. Laboratories within the [place] and its  territories are required to report all positive results to the appropriate  public health authorities. Negative results do not preclude SARS-CoV-2  infection and should not be used as the sole basis for treatment or other  patient management decisions. Negative results must be combined with  clinical observations, patient history, and epidemiological information.  This test has not been FDA cleared or approved.    This test has been authorized by FDA under an Emergency Use Authorization  (EUA). This test is only authorized for the duration of time the  declaration that circumstances exist justifying the authorization of the  emergency use of an in vitro diagnostic tests for detection of SARS-CoV-2  virus and/or diagnosis of COVID-19 infection under section 564(b)(1) of  the Act, 21 U.S.C. 360bbb-3(b)(1), unless the authorization is terminated  or revoked sooner. The test has been validated but independent review by FDA  and CLIA is pending.    Test performed using Pro[employer]yme GeneXpert: This RT-PCR assay targets N2,  a region unique to SARS-CoV-2. A conserved region in the E-gene was chosen  for pan-Sarbecovirus detection which includes SARS-CoV-2.    According to CMS-2020-01-R, this platform meets the definition of high-throughput technology.    Urine culture [[id number]] Collected: 01/01/24 1419    Lab Status: In process Specimen: Urine Updated: 01/01/24 1423    Urine Macroscopic, POC [[id number]] Collected: 01/01/24 1419    Lab Status: Final result Specimen: Urine Updated: 01/01/24 1420     Color, UA Yellow     Clarity, UA Clear     pH, UA 6.5     Leukocytes, UA Negative     Nitrite, UA Negative     Protein, UA Negative mg/dl      Glucose, UA Negative  mg/dl      Ketones, UA Negative mg/dl      Urobilinogen, UA 0.2 E.U./dl      Bilirubin, UA Negative     Occult Blood, UA Negative     Specific Gravity, UA 1.010    Narrative:      CLINITEK RESULT                   XR chest 1 view portable   ED Interpretation by Juan Holden MD (01/01 1506)   No pneumonia      Final Result by Ariel Coleman MD (01/02 0902)      Mild vascular congestion versus interstitial prominence which may indicate atypical/viral infection. No focal consolidation.                  Workstation performed: [id number]                    Procedures  ECG 12 Lead Documentation Only    Date/Time: 1/1/2024 2:22 PM    Performed by: Juan Holden MD  Authorized by: Juan Holden MD    ECG reviewed by me, the ED Provider: yes    Patient location:  ED  Interpretation:     Interpretation: normal    Rate:     ECG rate:  96    ECG rate assessment: normal    Rhythm:     Rhythm: sinus rhythm    QRS:     QRS axis:  Normal    QRS intervals:  Normal  Conduction:     Conduction: normal    ST segments:     ST segments:  Normal  T waves:     T waves: non-specific             ED Course  ED Course as of 01/02/24 1404   Mon Jan 01, 2024   1506 Chest x-ray evaluated by me, interpretation:  No pneumonia   1525 SARS-COV-2(!): Positive                               SBIRT 20yo+      Flowsheet Row Most Recent Value   Initial Alcohol Screen: US AUDIT-C     1. How often do you have a drink containing alcohol? 0 Filed at: 01/01/2024 1412   2. How many drinks containing alcohol do you have on a typical day you are drinking?  0 Filed at: 01/01/2024 1412   3a. Male UNDER 65: How often do you have five or more drinks on one occasion? 0 Filed at: 01/01/2024 1412   3b. FEMALE Any Age, or MALE 65+: How often do you have 4 or more drinks on one occassion? 0 Filed at: 01/01/2024 1412   Audit-C Score 0 Filed at: 01/01/2024 1412   TORREY: How many times in the past year have you...    Used an illegal drug or used a prescription  medication for non-medical reasons? Never Filed at: 01/01/2024 1412                      Medical Decision Making  1. Flu-like symptoms - Patient appears generally well but states she has been increasingly shortness of breath. Saturations are alright. Will check COVID/Flu/RSV, patient has known COVID but may have another viral infection as well. Will check CXR to rule out PNA. Will check ambulatory pulse oximetry. OB recommendations have been to treat COVID with antivirals in setting of infection during pregnancy.     Problems Addressed:  Cough: acute illness or injury  COVID-19: acute illness or injury    Amount and/or Complexity of Data Reviewed  Labs: ordered. Decision-making details documented in ED Course.  Radiology: ordered and independent interpretation performed. Decision-making details documented in ED Course.    Risk  Prescription drug management.             Disposition  Final diagnoses:   Cough   COVID-19     Time reflects when diagnosis was documented in both MDM as applicable and the Disposition within this note       Time User Action Codes Description Comment    1/1/2024  3:07 PM Juan Holden Add [R05.9] Cough     1/1/2024  3:09 PM Juan Holden Add [U07.1] COVID-19           ED Disposition       ED Disposition   Discharge    Condition   Stable    Date/Time   Mon Jan 1, 2024 1507    Comment   Mala Jaramillo discharge to home/self care.                   Follow-up Information       Follow up With Specialties Details Why Contact Carlos Holland DO Family Medicine   [address]  [address]  [facility] [address]  [phone]              Discharge Medication List as of 1/1/2024  3:10 PM        START taking these medications    Details   nirmatrelvir & ritonavir (Paxlovid, 300/100,) tablet therapy pack Take 3 tablets by mouth 2 (two) times a day for 5 days Take 2 nirmatrelvir tablets + 1 ritonavir tablet together per dose, Starting Mon 1/1/2024, Until Sat 1/6/2024, Print           CONTINUE  these medications which have NOT CHANGED    Details   albuterol (PROVENTIL HFA,VENTOLIN HFA) 90 mcg/act inhaler Inhale 2 puffs every 6 (six) hours as needed for wheezing or shortness of breath, Starting Fri 5/6/2022, Normal      ferrous sulfate 324 (65 Fe) mg Take 1 tablet (324 mg total) by mouth every other day, Starting Thu 11/16/2023, Until Fri 11/15/2024, Normal      fluticasone (FLONASE) 50 mcg/act nasal spray 1 spray into each nostril daily, Starting Tue 12/5/2023, Normal      levothyroxine 25 mcg tablet Take 1 tablet (25 mcg total) by mouth daily, Starting Tue 12/6/2022, Normal      MAGNESIUM PO Take 1 tablet by mouth daily, Historical Med      Prenatal Vit-Fe Fumarate-FA (PRENATAL VITAMIN PO) Take by mouth, Historical Med             No discharge procedures on file.    PDMP Review       None            ED Provider  Electronically Signed by             Juan Holden MD  01/02/24 6914

## 2024-01-01 NOTE — TELEPHONE ENCOUNTER
"Reason for Disposition  • Patient sounds very sick or weak to the triager    Answer Assessment - Initial Assessment Questions  1. COVID-19 DIAGNOSIS: \"Who made your COVID-19 diagnosis?\" \"Was it confirmed by a positive lab test or self-test?\" If not diagnosed by a doctor (or NP/PA), ask \"Are there lots of cases (community spread) where you live?\" Note: See public health department website, if unsure.  At home test positive yesterday   3. ONSET: \"When did the COVID-19 symptoms start?\"       Thursday night   4. WORST SYMPTOM: \"What is your worst symptom?\" (e.g., cough, fever, shortness of breath, muscle aches)      Chest pain, hot, short of breath   5. COUGH: \"Do you have a cough?\" If Yes, ask: \"How bad is the cough?\"        Non productive   6. FEVER: \"Do you have a fever?\" If Yes, ask: \"What is your temperature, how was it measured, and when did it start?\"      Feels Hot   7. RESPIRATORY STATUS: \"Describe your breathing?\" (e.g., shortness of breath, wheezing, unable to speak)       Feels short of breath   8. BETTER-SAME-WORSE: \"Are you getting better, staying the same or getting worse compared to yesterday?\"  If getting worse, ask, \"In what way?\"      worse  10. VACCINE: \"Have you had the COVID-19 vaccine?\" If Yes, ask: \"Which one, how many shots, when did you get it?\"        yes  11. BOOSTER: \"Have you received your COVID-19 booster?\" If Yes, ask: \"Which one and when did you get it?\"        yes  12. PREGNANCY: \"Is there any chance you are pregnant?\" \"When was your last menstrual period?\"        Yes 33 weeks pregnant   13. OTHER SYMPTOMS: \"Do you have any other symptoms?\"  (e.g., chills, fatigue, headache, loss of smell or taste, muscle pain, sore throat)        Feels hot, sweating, light headed    Protocols used: Coronavirus (COVID-19) Diagnosed or Suspected-ADULT-AH    "

## 2024-01-01 NOTE — TELEPHONE ENCOUNTER
"Regarding: covid/pregnant/SOB/chest pain  ----- Message from Kirsten Parmar sent at 1/1/2024 12:07 PM EST -----  \"I called yesterday due to covid, and they told me to call back if I experience shortness of breath or chest pain. I am experiencing shortness of breath now and chest pain. I feel very hot also.\"    "

## 2024-01-02 LAB — BACTERIA UR CULT: NORMAL

## 2024-01-02 NOTE — TELEPHONE ENCOUNTER
Attempted to contact patient to follow up from "[employer] - phone states it is not accepting calls at this time.  Has follow up scheduled tomorrow in office for routine OB appointment

## 2024-01-03 ENCOUNTER — TELEMEDICINE (OUTPATIENT)
Dept: OBGYN CLINIC | Facility: MEDICAL CENTER | Age: 29
End: 2024-01-03
Payer: COMMERCIAL

## 2024-01-03 DIAGNOSIS — O98.513 COVID-19 AFFECTING PREGNANCY IN THIRD TRIMESTER: Primary | ICD-10-CM

## 2024-01-03 DIAGNOSIS — U07.1 COVID-19 AFFECTING PREGNANCY IN THIRD TRIMESTER: Primary | ICD-10-CM

## 2024-01-03 PROCEDURE — 99213 OFFICE O/P EST LOW 20 MIN: CPT | Performed by: OBSTETRICS & GYNECOLOGY

## 2024-01-03 NOTE — PROGRESS NOTES
Virtual Regular Visit    Verification of patient location:    Patient is located at Home in the following state in which I hold an active license [place]      Assessment/Plan:    Problem List Items Addressed This Visit    None           Reason for visit is   Chief Complaint   Patient presents with    Virtual Regular Visit          Encounter provider Alena Espinosa MD    Provider located at [facility]  [facility] [address]  [address]  [place] [address]      Recent Visits  No visits were found meeting these conditions.  Showing recent visits within past 7 days and meeting all other requirements  Today's Visits  Date Type Provider Dept   24 Telemedicine Alena Espinosa MD  [facility]   Showing today's visits and meeting all other requirements  Future Appointments  No visits were found meeting these conditions.  Showing future appointments within next 150 days and meeting all other requirements       The patient was identified by name and date of birth. Mala Jaramillo was informed that this is a telemedicine visit and that the visit is being conducted through the Epic Embedded platform. She agrees to proceed..  My office door was closed. No one else was in the room.  She acknowledged consent and understanding of privacy and security of the video platform. The patient has agreed to participate and understands they can discontinue the visit at any time.    Patient is aware this is a billable service.     Subjective  Mala Jaramillo is a 28 y.o. female  recently diagnosed with COVID. She states her symptoms started 2023. She presented to the ER after her symptoms of SOB and chest pressure increased. She states she never started the Paxlovid. She states she never had a fever. She does have a growth scan scheduled 2024.      HPI     Past Medical History:   Diagnosis Date    Anxiety     Asthma     Disease of thyroid gland      Enteritis 12/10/2018    Migraines     Pancytopenia (HCC) 12/12/2018       Past Surgical History:   Procedure Laterality Date    EXTERNAL EAR SURGERY         Current Outpatient Medications   Medication Sig Dispense Refill    albuterol (PROVENTIL HFA,VENTOLIN HFA) 90 mcg/act inhaler Inhale 2 puffs every 6 (six) hours as needed for wheezing or shortness of breath 6.7 g 0    ferrous sulfate 324 (65 Fe) mg Take 1 tablet (324 mg total) by mouth every other day 60 tablet 3    levothyroxine 25 mcg tablet Take 1 tablet (25 mcg total) by mouth daily 90 tablet 0    MAGNESIUM PO Take 1 tablet by mouth daily      nirmatrelvir & ritonavir (Paxlovid, 300/100,) tablet therapy pack Take 3 tablets by mouth 2 (two) times a day for 5 days Take 2 nirmatrelvir tablets + 1 ritonavir tablet together per dose 30 tablet 0    Prenatal Vit-Fe Fumarate-FA (PRENATAL VITAMIN PO) Take by mouth       No current facility-administered medications for this visit.        No Known Allergies    Review of Systems   Constitutional:  Negative for fever.   Respiratory:  Positive for cough and chest tightness.        Video Exam    There were no vitals filed for this visit.    Physical Exam   General: Patient appears well-developed. Patient is adequately nourished. Patient is not diaphoretic. Patient is not in distress.  Neck: Visualization of the neck demonstrates no grossly visible masses. Neck mobility is not compromised, neck appears supple.   HEENT: Oral mucosa appears moist. Patient does not identify palpable neck masses. Patient reports no oral tenderness or readily identifiable masses.  Eyes: Conjunctivae appear normal bilaterally. Right eye with no discharge. Left eye with no discharge. No evidence of scleral icterus. No evidence of strabismus.  Respiratory: Respiratory effort appears normal. There is no respiratory distress. Patient able to speak in full sentences. There was no audible stridor or cough.  Abdomen: Patient states her abdomen is soft.  States abdomen is non-tender. States abdomen is non-distended. Patient denies visible or palpable bulges to suggest hernias.  Musculoskeletal: Patient reports and I can confirm no visible deformities in 4 extremities. Patient reports and I can confirm full mobility in 4 extremities. There is no grossly visible limb edema. There is no evidence of clubbing or peripheral cyanosis.  Neurologic: Patient is fully alert and responsive. Patient is oriented to time, place and person. Gross evaluation of CNs III-IV-VI-VII-VIII and XI demonstrates no deficits. Patient reports normal gait and balance.  Skin: My evaluation of exposed skin areas reveals no evidence of pallor. My evaluation of exposed skin areas reveals no obvious rashes. My evaluation of exposed skin areas reveals no grossly visible lesions. My evaluation of exposed skin areas reveals no evidence of erythema.  Psychiatric / Behavioral: Patient's mood and affect appears normal. Patient's judgement is preserved. Patient is coherent and thought content appears directionally and contextually appropriate for age and health status.

## 2024-01-05 ENCOUNTER — TELEPHONE (OUTPATIENT)
Dept: OBGYN CLINIC | Facility: MEDICAL CENTER | Age: 29
End: 2024-01-05

## 2024-01-05 ENCOUNTER — HOSPITAL ENCOUNTER (OUTPATIENT)
Facility: HOSPITAL | Age: 29
Discharge: HOME | End: 2024-01-05
Attending: OBSTETRICS & GYNECOLOGY | Admitting: OBSTETRICS & GYNECOLOGY
Payer: COMMERCIAL

## 2024-01-05 VITALS
RESPIRATION RATE: 18 BRPM | HEART RATE: 73 BPM | DIASTOLIC BLOOD PRESSURE: 63 MMHG | TEMPERATURE: 98 F | SYSTOLIC BLOOD PRESSURE: 111 MMHG

## 2024-01-05 LAB
BILIRUB UR QL STRIP: NEGATIVE
CLARITY UR: CLEAR
COLOR UR: COLORLESS
GLUCOSE UR STRIP-MCNC: ABNORMAL MG/DL
HGB UR QL STRIP.AUTO: NEGATIVE
KETONES UR STRIP-MCNC: NEGATIVE MG/DL
LEUKOCYTE ESTERASE UR QL STRIP: NEGATIVE
NITRITE UR QL STRIP: NEGATIVE
PH UR STRIP.AUTO: 6 [PH]
PROT UR STRIP-MCNC: NEGATIVE MG/DL
SP GR UR STRIP.AUTO: 1 (ref 1–1.03)
UROBILINOGEN UR STRIP-ACNC: <2 MG/DL

## 2024-01-05 PROCEDURE — NC001 PR NO CHARGE: Performed by: OBSTETRICS & GYNECOLOGY

## 2024-01-05 PROCEDURE — 76817 TRANSVAGINAL US OBSTETRIC: CPT

## 2024-01-05 PROCEDURE — 99212 OFFICE O/P EST SF 10 MIN: CPT

## 2024-01-05 PROCEDURE — 99213 OFFICE O/P EST LOW 20 MIN: CPT | Performed by: OBSTETRICS & GYNECOLOGY

## 2024-01-05 NOTE — PROGRESS NOTES
L&D Triage Note - OB/GYN  Mala Jaramillo 28 y.o. female MRN: [id number]  Unit/Bed#: L&D 325-01 Encounter: [id number]    Patient is seen by [facility]    ASSESSMENT  Mala Jaramillo is a 28 y.o.  at 33w6d who presents with abdominal and back pain in setting of COVID; rule out  labor.   labor workup negative with microscopy and speculum exam unremarkable, cervical length 4.1 cm, SVE 0/0/-3.  No contractions on tocometry.  Discussed patient may be feeling pain in setting of COVID infection or Jose D Tan contractions, recommended Tylenol, hydration, warm showers, belly band and continuing expectant management as she does not have signs on exam today of being at high risk for  labor.  NST reactive and reassuring.  Patient stable for discharge with return precautions.    PLAN  #1.  Rule out  labor:   Speculum exam: Vagina and cervix without erythema or lesions, cervical os visually closed, scant physiologic discharge in posterior vaginal fornix.  Microscopy unremarkable  TVUS with cervical length 4.1cm  SVE: 0/0/-3  UA pending    #2.  Fetal wellbeing:   FHT: baseline 130, moderate variability, 15x15 accelerations present, absent decelerations  No contractions on tocometry    #3. Discharge instructions  Patient instructed to call if experiencing worsening contractions, vaginal bleeding, loss of fluid or decreased fetal movement.  Will follow up with OBGYN on 2024.    D/w Dr. Rojas  ______________    SUBJECTIVE    FRANK: Estimated Date of Delivery: 24    HPI:  28 y.o.  33w6d presents with complaint of abdominal and back pain.     She called the office today complaining of back cramping that started at 3 AM; it felt similar to period cramps.  She reports this is happening about every 10 minutes since last night, she has not taken Tylenol for pain yet.  She also has COVID-19 and has been describing general malaise due to her infection.  She reports minimal  increase in vaginal discharge, denies loss of fluid, vaginal bleeding, and reports good fetal movement.  She denies dysuria, constipation, diarrhea, vaginal pain.    Contractions: present  Leakage of fluid: absent  Vaginal Bleeding: absent  Fetal movement: present    Her current obstetrical history is significant for hypothyroidism and current COVID positivity; tested positive on 1/1/2024      ROS:  Constitutional: Malaise, fatigue  Respiratory: Negative  Cardiovascular: Negative    Gastrointestinal: Negative    OBJECTIVE:  /63 (BP Location: Right arm)   Pulse 73   Temp 98 °F (36.7 °C) (Oral)   Resp 18   LMP 05/13/2023   There is no height or weight on file to calculate BMI.    Physical Exam:  GEN: The patient was alert and oriented x3, pleasant well-appearing female in no acute distress.     CV:  Regular rate   RESP:  Unlabored breathing  GI:  Soft, nontender, non-distended  MSK: bilateral lower extremities are nontender, no edema  : Normal appearing external female genitalia, normal appearing urethral meatus. On sterile speculum exam, normal appearing vaginal epithelium, no vaginal discharge, no bleeding, grossly normal appearing closed cervix.      NANI/[place]et [place]:     Infection:   - negative clue cells    - negative hyphae   - negative trichomonads present    Membrane status   - negative ferning   - negative nitrazene   - negative pooling     SVE:  0/0/-3    FHT:  FHT: baseline 130, moderate variability, 15x15 accelerations present, absent decelerations    TOCO:   No contractions on toco    IMAGING:       TVUS   Cervical length         - 4.03cm         - 4.1cm         - 4.5cm   Presentation: cephalic    Labs: No results found for this or any previous visit (from the past 24 hour(s)).      Swetha Garcia MD  OB/GYN PGY-1  1/5/2024  5:43 PM

## 2024-01-05 NOTE — DISCHARGE INSTRUCTIONS
Pregnancy at 31 to 34 Weeks   WHAT YOU NEED TO KNOW:   What changes are happening with my body?  You may continue to have symptoms such as shortness of breath, heartburn, contractions, or swelling of your ankles and feet. You may be gaining about 1 pound a week now.   How do I care for myself at this stage of my pregnancy?       Eat a variety of healthy foods.  Healthy foods include fruits, vegetables, whole-grain breads, low-fat dairy foods, beans, lean meats, and fish. Drink liquids as directed. Ask how much liquid to drink each day and which liquids are best for you. Limit caffeine to less than 200 milligrams each day. Limit your intake of fish to 2 servings each week. Choose fish low in mercury such as canned light tuna, shrimp, salmon, cod, or tilapia. Do not  eat fish high in mercury such as swordfish, tilefish, seven mackerel, and shark.         Manage heartburn  by eating 4 or 5 small meals each day instead of large meals. Avoid spicy food.    Manage swelling  by lying down and putting your feet up.         Take prenatal vitamins as directed.  Your need for certain vitamins and minerals, such as folic acid, increases during pregnancy. Prenatal vitamins provide some of the extra vitamins and minerals you need. Prenatal vitamins may also help to decrease the risk of certain birth defects.         Talk to your healthcare provider about exercise.  Moderate exercise can help you stay fit. Your healthcare provider will help you plan an exercise program that is safe for you during pregnancy.         Do not smoke.  Smoking increases your risk of a miscarriage and other health problems during your pregnancy. Smoking can cause your baby to be born too early or weigh less at birth. Ask your healthcare provider for information if you need help quitting.    Do not drink alcohol.  Alcohol passes from your body to your baby through the placenta. It can affect your baby's brain development and cause fetal alcohol syndrome  (FAS). FAS is a group of conditions that causes mental, behavior, and growth problems.    Talk to your healthcare provider before you take any medicines.  Many medicines may harm your baby if you take them when you are pregnant. Do not take any medicines, vitamins, herbs, or supplements without first talking to your healthcare provider. Never use illegal or street drugs (such as marijuana or cocaine) while you are pregnant.    What are some safety tips during pregnancy?   Avoid hot tubs and saunas.  Do not use a hot tub or sauna while you are pregnant, especially during your first trimester. Hot tubs and saunas may raise your baby's temperature and increase the risk of birth defects.    Avoid toxoplasmosis.  This is an infection caused by eating raw meat or being around infected cat feces. It can cause birth defects, miscarriages, and other problems. Wash your hands after you touch raw meat. Make sure any meat is well-cooked before you eat it. Avoid raw eggs and unpasteurized milk. Use gloves or ask someone else to clean your cat's litter box while you are pregnant.       What changes are happening with my baby?  By 34 weeks, your baby may weigh more than 5 pounds. Your baby will be about 12 ½ inches long from the top of the head to the rump (baby's bottom). Your baby is gaining about ½ pound a week. Your baby's eyes open and close now. Your baby's kicks and movements are more forceful at this time.  What do I need to know about prenatal care?  Your healthcare provider will check your blood pressure and weight. You may also need the following:  A urine test  may also be done to check for sugar and protein. These can be signs of gestational diabetes or infection. Protein in your urine may also be a sign of preeclampsia. Preeclampsia is a condition that can develop during week 20 or later of your pregnancy. It causes high blood pressure, and it can cause problems with your kidneys and other organs.    A gestational  diabetes screen  may be done. Your healthcare provider may order either a 1-step or 2-step oral glucose tolerance test (OGTT).     1-step OGTT:  Your blood sugar level will be tested after you have not eaten for 8 hours (fasting). You will then be given a glucose drink. Your level will be tested again 1 hour and 2 hours after you finish the drink.    2-step OGTT:  You do not have to fast for the first part of the test. You will have the glucose drink at any time of day. Your blood sugar level will be checked 1 hour later. If your blood sugar is higher than a certain level, another test will be ordered. You will fast and your blood sugar level will be tested. You will have the glucose drink. Your blood will be tested again 1 hour, 2 hours, and 3 hours after you finish the glucose drink.    A Tdap vaccine  may be recommended by your healthcare provider.    Fundal height  is a measurement of your uterus to check your baby's growth. This number is usually the same as the number of weeks that you have been pregnant. Your healthcare provider may also check your baby's position.    Your baby's heart rate  will be checked.    When should I seek immediate care?   You develop a severe headache that does not go away.    You have new or increased vision changes, such as blurred or spotted vision.    You have new or increased swelling in your face or hands.    You have vaginal spotting or bleeding.    Your water broke or you feel warm water gushing or trickling from your vagina.    When should I call my obstetrician?   You have more than 5 contractions in 1 hour.    You notice any changes in your baby's movements.    You have abdominal cramps, pressure, or tightening.    You have a change in vaginal discharge.    You have chills or a fever.    You have vaginal itching, burning, or pain.    You have yellow, green, white, or foul-smelling vaginal discharge.    You have pain or burning when you urinate, less urine than usual, or  pink or bloody urine.    You have questions or concerns about your condition or care.    CARE AGREEMENT:   You have the right to help plan your care. Learn about your health condition and how it may be treated. Discuss treatment options with your healthcare providers to decide what care you want to receive. You always have the right to refuse treatment. The above information is an  only. It is not intended as medical advice for individual conditions or treatments. Talk to your doctor, nurse or pharmacist before following any medical regimen to see if it is safe and effective for you.  © Copyright [employer] 2022 Information is for End User's use only and may not be sold, redistributed or otherwise used for commercial purposes. All illustrations and images included in CareNotes® are the copyrighted property of A.D.A.M., Inc. or [employer]

## 2024-01-05 NOTE — LETTER
[facility] LABOR AND DELIVERY  [address] [address]  Dept: [phone]    January 5, 2024     Patient: Mala Jaramillo   YOB: 1995   Date of Visit: 1/5/2024       To Whom it May Concern:    Mala Jaramillo is under my professional care. She was seen in the hospital on 01/05/24.   If you have any questions or concerns, please don't hesitate to call.         Sincerely,        Graciela Chavez MD

## 2024-01-05 NOTE — PROCEDURES
Mala Jaramillo, a  at 33w6d with an FRANK of 2024, Date entered prior to episode creation, was seen at [facility] LABOR AND DELIVERY for the following procedure(s): $Procedure Type: US - Transvaginal]           Ultrasound Other  Fetal Presentation: Vertex  Cervical Length: 4.1  Funnel: No  Placenta Previa: No  Vasa Previa: No             Swetha Garcia MD  24  5:40 PM

## 2024-01-05 NOTE — TELEPHONE ENCOUNTER
Contacted patient to discuss symptoms.  Patient stated she has had an increase in normal vaginal discharge.  She has been drinking fluids and tried tylenol.  Symptoms have not been relieved.  Denies bleeding, contractions, fluid.      Advised patient that per provider in office, patient to go to L&D for evaluation.  Patient agreeable with plan of care.      Charge nurse notified and on call provider in hospital.

## 2024-01-05 NOTE — TELEPHONE ENCOUNTER
Patient called into office today at 34 weeks pregnant. Patient with c/o back cramping since 3 AM with little to no relief. Patient would like to know if there are any recommendations to help. Patient reports no contractions that she knows of at this time, no leakage of fluid, and no bleeding. Patient described back pain as similar to bad period cramps. Please review, thank you!

## 2024-01-09 ENCOUNTER — CLINICAL SUPPORT (OUTPATIENT)
Dept: POSTPARTUM | Facility: CLINIC | Age: 29
End: 2024-01-09

## 2024-01-09 DIAGNOSIS — Z32.2 ENCOUNTER FOR CHILDBIRTH INSTRUCTION: Primary | ICD-10-CM

## 2024-01-11 ENCOUNTER — ULTRASOUND (OUTPATIENT)
Age: 29
End: 2024-01-11
Payer: COMMERCIAL

## 2024-01-11 VITALS
HEIGHT: 64 IN | WEIGHT: 203 LBS | SYSTOLIC BLOOD PRESSURE: 112 MMHG | HEART RATE: 87 BPM | BODY MASS INDEX: 34.66 KG/M2 | DIASTOLIC BLOOD PRESSURE: 66 MMHG

## 2024-01-11 DIAGNOSIS — Z36.4 ULTRASOUND FOR ANTENATAL SCREENING FOR FETAL GROWTH RESTRICTION: ICD-10-CM

## 2024-01-11 DIAGNOSIS — O98.513 COVID-19 AFFECTING PREGNANCY IN THIRD TRIMESTER: ICD-10-CM

## 2024-01-11 DIAGNOSIS — O16.3 ELEVATED BLOOD PRESSURE AFFECTING PREGNANCY IN THIRD TRIMESTER, ANTEPARTUM: ICD-10-CM

## 2024-01-11 DIAGNOSIS — Z3A.34 34 WEEKS GESTATION OF PREGNANCY: Primary | ICD-10-CM

## 2024-01-11 DIAGNOSIS — U07.1 COVID-19 AFFECTING PREGNANCY IN THIRD TRIMESTER: ICD-10-CM

## 2024-01-11 DIAGNOSIS — O99.283 HYPOTHYROIDISM IN PREGNANCY, THIRD TRIMESTER: ICD-10-CM

## 2024-01-11 DIAGNOSIS — E03.9 HYPOTHYROIDISM IN PREGNANCY, THIRD TRIMESTER: ICD-10-CM

## 2024-01-11 PROCEDURE — 59025 FETAL NON-STRESS TEST: CPT | Performed by: OBSTETRICS & GYNECOLOGY

## 2024-01-11 PROCEDURE — 76816 OB US FOLLOW-UP PER FETUS: CPT | Performed by: OBSTETRICS & GYNECOLOGY

## 2024-01-11 NOTE — LETTER
January 11, 2024     Alena Espinosa MD  [address] [place] [address]    Patient: Mala Jaramillo   YOB: 1995   Date of Visit: 1/11/2024       Dear Dr. Espinosa:    Thank you for referring Mala Jaramillo to me for evaluation. Below are my notes for this consultation.    If you have questions, please do not hesitate to call me. I look forward to following your patient along with you.         Sincerely,        Justin Munguia MD        CC: No Recipients

## 2024-01-11 NOTE — LETTER
NST sleeve cover sheet    Patient name: Mala Jaramillo  : 1995  MRN: [id number]    FRANK: Estimated Date of Delivery: 24    Obstetrician: _____________________________    Reason(s) for testing:  __________________________________________      Testing frequency:    ___ 2x/wk  ___ 1x/wk  ___ Dopplers  ___ BPP?      Last growth scan: __________________________________________

## 2024-01-11 NOTE — PROGRESS NOTES
Non-Stress Testing:    Non-Stress test, equipment, procedure, and expected outcomes explained. Reviewed fetal kick counts and when to call OB.Verified patient understanding of fetal kick counts with teach back method. Patient reports feeling daily fetal movements. Patient has no questions or concerns.

## 2024-01-11 NOTE — PROGRESS NOTES
Please refer to the [facility] ultrasound report in Ob Procedures for additional information regarding today's visit

## 2024-01-17 ENCOUNTER — ROUTINE PRENATAL (OUTPATIENT)
Dept: OBGYN CLINIC | Facility: MEDICAL CENTER | Age: 29
End: 2024-01-17
Payer: COMMERCIAL

## 2024-01-17 VITALS — SYSTOLIC BLOOD PRESSURE: 118 MMHG | DIASTOLIC BLOOD PRESSURE: 72 MMHG | WEIGHT: 207 LBS | BODY MASS INDEX: 35.81 KG/M2

## 2024-01-17 DIAGNOSIS — Z3A.35 35 WEEKS GESTATION OF PREGNANCY: Primary | ICD-10-CM

## 2024-01-17 DIAGNOSIS — O99.280 THYROID DISEASE AFFECTING PREGNANCY: ICD-10-CM

## 2024-01-17 DIAGNOSIS — R03.0 ELEVATED BP WITHOUT DIAGNOSIS OF HYPERTENSION: ICD-10-CM

## 2024-01-17 DIAGNOSIS — U07.1 COVID-19 AFFECTING PREGNANCY IN THIRD TRIMESTER: ICD-10-CM

## 2024-01-17 DIAGNOSIS — O98.513 COVID-19 AFFECTING PREGNANCY IN THIRD TRIMESTER: ICD-10-CM

## 2024-01-17 DIAGNOSIS — R53.83 OTHER FATIGUE: ICD-10-CM

## 2024-01-17 DIAGNOSIS — E07.9 THYROID DISEASE AFFECTING PREGNANCY: ICD-10-CM

## 2024-01-17 PROCEDURE — 99213 OFFICE O/P EST LOW 20 MIN: CPT | Performed by: OBSTETRICS & GYNECOLOGY

## 2024-01-17 NOTE — PROGRESS NOTES
Mala is a 28 y.o. year old  at 35w4d for routine prenatal visit.   + FM, no vaginal bleeding, contractions, or LOF  Complaints: yes  more fatigue  - TSH and CBC ordered despite normal values in November and December   Covid in pregnancy  - normal third trimester growth scan   One elevated BP  in pregnancy  - has not met criteria for  GHTN , aware unless other indication arises , IOL could occur from 39 weeks on  if desired    Most recent ultrasound and labs reviewed.  FKC reviewed  GBS needed next visit

## 2024-01-18 ENCOUNTER — OFFICE VISIT (OUTPATIENT)
Dept: FAMILY MEDICINE CLINIC | Facility: CLINIC | Age: 29
End: 2024-01-18
Payer: COMMERCIAL

## 2024-01-18 VITALS
BODY MASS INDEX: 35.3 KG/M2 | OXYGEN SATURATION: 98 % | SYSTOLIC BLOOD PRESSURE: 118 MMHG | TEMPERATURE: 98 F | HEART RATE: 88 BPM | DIASTOLIC BLOOD PRESSURE: 72 MMHG | HEIGHT: 64 IN | WEIGHT: 206.8 LBS

## 2024-01-18 DIAGNOSIS — Z00.00 HEALTH MAINTENANCE EXAMINATION: Primary | ICD-10-CM

## 2024-01-18 DIAGNOSIS — E07.9 THYROID DISEASE AFFECTING PREGNANCY: ICD-10-CM

## 2024-01-18 DIAGNOSIS — O99.513 ASTHMA AFFECTING PREGNANCY IN THIRD TRIMESTER: ICD-10-CM

## 2024-01-18 DIAGNOSIS — J45.909 ASTHMA AFFECTING PREGNANCY IN THIRD TRIMESTER: ICD-10-CM

## 2024-01-18 DIAGNOSIS — O99.280 THYROID DISEASE AFFECTING PREGNANCY: ICD-10-CM

## 2024-01-18 PROCEDURE — 99395 PREV VISIT EST AGE 18-39: CPT | Performed by: NURSE PRACTITIONER

## 2024-01-18 NOTE — PROGRESS NOTES
ADULT ANNUAL PHYSICAL  [facility] PRIMARY CARE    NAME: Mala Jaramillo  AGE: 28 y.o. SEX: female  : 1995     DATE: 2024     Assessment and Plan:     Problem List Items Addressed This Visit          Endocrine    Thyroid disease affecting pregnancy     Has been stable on 25 mcg. Has labs ordered by OB to be checked.   Recommend TSH / T4 in 6 months. Will wait to put labs in.             Respiratory    Asthma affecting pregnancy in third trimester     Albuterol PRN- usually only when sick          Other Visit Diagnoses       Health maintenance examination    -  Primary            Immunizations and preventive care screenings were discussed with patient today. Appropriate education was printed on patient's after visit summary.    Counseling:  Dental Health: discussed importance of regular tooth brushing, flossing, and dental visits.  Exercise: the importance of regular exercise/physical activity was discussed. Recommend exercise 3-5 times per week for at least 30 minutes.          Return for Recheck thyrooid .     Chief Complaint:     Chief Complaint   Patient presents with    Annual Exam      History of Present Illness:     Adult Annual Physical   Patient here for a comprehensive physical exam. The patient reports no problems.    Recent covid infection - doing well. Symptoms resolved.     History hypothyroidism.     Feels lump in throat. No trouble swallowing. No voice changes.     Diet and Physical Activity  Diet/Nutrition: poor diet.   Exercise: no formal exercise.      Depression Screening  PHQ-2/9 Depression Screening    Little interest or pleasure in doing things: 0 - not at all  Feeling down, depressed, or hopeless: 0 - not at all  PHQ-2 Score: 0  PHQ-2 Interpretation: Negative depression screen       General Health  Sleep:  wakes up a lot- currently pregnant .   Vision: most recent eye exam >1 year ago and wears glasses.   Dental: regular dental  visits.       /GYN Health  Follows with gynecology? yes .        Review of Systems:     Review of Systems   Constitutional:  Negative for chills and fever.   HENT:  Positive for congestion (chronic with pregnancy).    Eyes:  Negative for discharge.   Respiratory:  Negative for shortness of breath.    Cardiovascular:  Negative for chest pain.   Gastrointestinal:  Negative for constipation and diarrhea.   Genitourinary:  Negative for difficulty urinating.   Musculoskeletal:  Negative for joint swelling.   Skin:  Negative for rash.   Neurological:  Negative for headaches.   Hematological:  Negative for adenopathy.   Psychiatric/Behavioral:  The patient is not nervous/anxious.       Past Medical History:     Past Medical History:   Diagnosis Date    Anxiety     Asthma     Disease of thyroid gland     Enteritis 12/10/2018    Migraines     Pancytopenia (HCC) 12/12/2018      Past Surgical History:     Past Surgical History:   Procedure Laterality Date    EXTERNAL EAR SURGERY        Social History:     Social History     Socioeconomic History    Marital status: /Civil Union     Spouse name: None    Number of children: None    Years of education: None    Highest education level: None   Occupational History    None   Tobacco Use    Smoking status: Never    Smokeless tobacco: Never   Vaping Use    Vaping status: Never Used   Substance and Sexual Activity    Alcohol use: Not Currently     Comment: social     Drug use: No    Sexual activity: Yes     Partners: Male     Birth control/protection: None   Other Topics Concern    None   Social History Narrative    None     Social Determinants of Health     Financial Resource Strain: Not on file   Food Insecurity: Not on file   Transportation Needs: Not on file   Physical Activity: Not on file   Stress: Not on file   Social Connections: Not on file   Intimate Partner Violence: Not on file   Housing Stability: Not on file      Family History:     Family History   Problem  "Relation Age of Onset    Other Mother         Hysterectomy    Anemia Mother     Migraines Mother     Hypertension Father     No Known Problems Sister     Diabetes Maternal Grandmother     Heart disease Maternal Grandmother     Hypertension Maternal Grandmother     Hyperlipidemia Maternal Grandmother     Alzheimer's disease Maternal Grandmother     Alzheimer's disease Maternal Grandfather     Cataracts Maternal Grandfather     Stroke Paternal Grandmother     Thyroid disease Paternal Grandmother     Cancer Other         possibly uterine    Breast cancer Neg Hx     Colon cancer Neg Hx     Ovarian cancer Neg Hx       Current Medications:     Current Outpatient Medications   Medication Sig Dispense Refill    albuterol (PROVENTIL HFA,VENTOLIN HFA) 90 mcg/act inhaler Inhale 2 puffs every 6 (six) hours as needed for wheezing or shortness of breath 6.7 g 0    ferrous sulfate 324 (65 Fe) mg Take 1 tablet (324 mg total) by mouth every other day 60 tablet 3    levothyroxine 25 mcg tablet Take 1 tablet (25 mcg total) by mouth daily 90 tablet 0    MAGNESIUM PO Take 1 tablet by mouth daily      Prenatal Vit-Fe Fumarate-FA (PRENATAL VITAMIN PO) Take by mouth       No current facility-administered medications for this visit.      Allergies:     No Known Allergies   Physical Exam:     /72   Pulse 88   Temp 98 °F (36.7 °C)   Ht 5' 3.75\" (1.619 m)   Wt 93.8 kg (206 lb 12.8 oz)   LMP 05/13/2023   SpO2 98%   BMI 35.78 kg/m²     Physical Exam  Vitals and nursing note reviewed.   Constitutional:       General: She is not in acute distress.     Appearance: Normal appearance. She is not ill-appearing, toxic-appearing or diaphoretic.   HENT:      Head: Normocephalic and atraumatic.      Right Ear: Tympanic membrane, ear canal and external ear normal. There is no impacted cerumen.      Left Ear: Tympanic membrane, ear canal and external ear normal. There is no impacted cerumen.      Nose: Nose normal.      Mouth/Throat:      " Mouth: Mucous membranes are moist.      Pharynx: Oropharynx is clear. No oropharyngeal exudate or posterior oropharyngeal erythema.   Eyes:      General: Lids are normal. No scleral icterus.        Right eye: No discharge.         Left eye: No discharge.      Extraocular Movements: Extraocular movements intact.      Conjunctiva/sclera: Conjunctivae normal.      Pupils: Pupils are equal, round, and reactive to light.   Cardiovascular:      Rate and Rhythm: Normal rate and regular rhythm. No extrasystoles are present.     Heart sounds: S1 normal and S2 normal. No murmur heard.  Pulmonary:      Effort: Pulmonary effort is normal. No respiratory distress.      Breath sounds: Normal breath sounds. No stridor or decreased air movement. No wheezing or rhonchi.   Abdominal:      Comments: Pregnant   Musculoskeletal:         General: Normal range of motion.      Cervical back: Normal range of motion and neck supple.   Lymphadenopathy:      Head:      Right side of head: Tonsillar adenopathy present.      Left side of head: No tonsillar adenopathy.      Comments: Very mild right tonsillar lymphadenopathy   Skin:     General: Skin is warm and dry.   Neurological:      General: No focal deficit present.      Mental Status: She is alert and oriented to person, place, and time. Mental status is at baseline.      Cranial Nerves: No cranial nerve deficit.      Sensory: No sensory deficit.      Motor: No weakness.      Coordination: Coordination normal.      Gait: Gait normal.   Psychiatric:         Attention and Perception: Attention and perception normal.         Mood and Affect: Mood and affect normal.         Speech: Speech normal.         Behavior: Behavior is cooperative.         Cognition and Memory: Cognition normal.         Judgment: Judgment normal.          LIZ Lerma   [facility] PRIMARY CARE

## 2024-01-18 NOTE — ASSESSMENT & PLAN NOTE
Has been stable on 25 mcg. Has labs ordered by OB to be checked.   Recommend TSH / T4 in 6 months. Will wait to put labs in.

## 2024-01-18 NOTE — PATIENT INSTRUCTIONS
Please call the office if you are experiencing any worsening of symptoms or no symptom improvement.

## 2024-01-22 ENCOUNTER — PATIENT MESSAGE (OUTPATIENT)
Dept: OBGYN CLINIC | Facility: MEDICAL CENTER | Age: 29
End: 2024-01-22

## 2024-01-24 ENCOUNTER — ROUTINE PRENATAL (OUTPATIENT)
Dept: OBGYN CLINIC | Facility: MEDICAL CENTER | Age: 29
End: 2024-01-24
Payer: COMMERCIAL

## 2024-01-24 VITALS — DIASTOLIC BLOOD PRESSURE: 70 MMHG | SYSTOLIC BLOOD PRESSURE: 124 MMHG | WEIGHT: 207.2 LBS | BODY MASS INDEX: 35.85 KG/M2

## 2024-01-24 DIAGNOSIS — O16.3 ELEVATED BLOOD PRESSURE AFFECTING PREGNANCY IN THIRD TRIMESTER, ANTEPARTUM: ICD-10-CM

## 2024-01-24 DIAGNOSIS — Z3A.36 36 WEEKS GESTATION OF PREGNANCY: Primary | ICD-10-CM

## 2024-01-24 DIAGNOSIS — E07.9 THYROID DISEASE AFFECTING PREGNANCY: ICD-10-CM

## 2024-01-24 DIAGNOSIS — O99.280 THYROID DISEASE AFFECTING PREGNANCY: ICD-10-CM

## 2024-01-24 PROCEDURE — 99214 OFFICE O/P EST MOD 30 MIN: CPT | Performed by: OBSTETRICS & GYNECOLOGY

## 2024-01-24 PROCEDURE — 87150 DNA/RNA AMPLIFIED PROBE: CPT | Performed by: OBSTETRICS & GYNECOLOGY

## 2024-01-25 ENCOUNTER — TELEPHONE (OUTPATIENT)
Dept: OBGYN CLINIC | Facility: MEDICAL CENTER | Age: 29
End: 2024-01-25

## 2024-01-26 LAB — GP B STREP DNA SPEC QL NAA+PROBE: NEGATIVE

## 2024-01-30 ENCOUNTER — ROUTINE PRENATAL (OUTPATIENT)
Dept: OBGYN CLINIC | Facility: MEDICAL CENTER | Age: 29
End: 2024-01-30
Payer: COMMERCIAL

## 2024-01-30 VITALS — BODY MASS INDEX: 36.09 KG/M2 | WEIGHT: 208.6 LBS | DIASTOLIC BLOOD PRESSURE: 72 MMHG | SYSTOLIC BLOOD PRESSURE: 106 MMHG

## 2024-01-30 DIAGNOSIS — Z3A.37 37 WEEKS GESTATION OF PREGNANCY: ICD-10-CM

## 2024-01-30 DIAGNOSIS — O99.280 THYROID DISEASE AFFECTING PREGNANCY: ICD-10-CM

## 2024-01-30 DIAGNOSIS — Z34.93 THIRD TRIMESTER PREGNANCY: Primary | ICD-10-CM

## 2024-01-30 DIAGNOSIS — E07.9 THYROID DISEASE AFFECTING PREGNANCY: ICD-10-CM

## 2024-01-30 PROCEDURE — 99214 OFFICE O/P EST MOD 30 MIN: CPT | Performed by: OBSTETRICS & GYNECOLOGY

## 2024-01-30 NOTE — PROGRESS NOTES
Assessment  28 y.o.  at 37w3d presenting for routine prenatal visit.     Plan  Diagnoses and all orders for this visit:    Third trimester pregnancy  37 weeks gestation of pregnancy  - Labor precaution  - FKC  - Discussed eIOL vs expectant mgmt. Prefers expectant presently, but would consider IOL as FRANK approaches  - Return in 1wk for PN    Thyroid disease affecting pregnancy  - Continue levothyroxine    ____________________________________________________________        Subjective    Mala Jaramillo is a 28 y.o.  at 37w3d who presents for routine prenatal visit. She is uncomfortable with advancing EGA. Notes pelvic pressure and stretching pains. Passed portion of mucus plug as well. Irregular cramping, not ctxns yet. She denies loss of fluid, or vaginal bleeding. She feels regular fetal movements.     Pregnancy Problems:  Patient Active Problem List   Diagnosis    Allergic rhinitis    Generalized anxiety disorder    Acute nonintractable headache    Thyroid disease affecting pregnancy    Asthma affecting pregnancy in third trimester    37 weeks gestation of pregnancy    Round ligament pain    Elevated blood pressure affecting pregnancy in third trimester, antepartum    Elevated BP without diagnosis of hypertension    COVID-19 affecting pregnancy in third trimester         Objective  /72   Wt 94.6 kg (208 lb 9.6 oz)   LMP 2023   BMI 36.09 kg/m²     FHT: 156 BPM   Uterine Size: size equals dates   Presentations: cephalic   Pelvic Exam:     Dilation: 1cm    Effacement: 50%    Station:  -2    Consistency: soft    Position: middle     Physical Exam:  Physical Exam  Constitutional:       General: She is not in acute distress.     Appearance: Normal appearance. She is well-developed. She is not ill-appearing, toxic-appearing or diaphoretic.   HENT:      Head: Normocephalic and atraumatic.   Eyes:      General: No scleral icterus.        Right eye: No discharge.         Left eye: No discharge.       Conjunctiva/sclera: Conjunctivae normal.   Pulmonary:      Effort: Pulmonary effort is normal. No accessory muscle usage or respiratory distress.   Abdominal:      General: There is distension (gravid).      Tenderness: There is no abdominal tenderness. There is no guarding or rebound.   Skin:     General: Skin is warm and dry.      Coloration: Skin is not jaundiced.      Findings: No bruising, erythema or rash.   Neurological:      Mental Status: She is alert.   Psychiatric:         Mood and Affect: Mood normal.         Behavior: Behavior normal.         Thought Content: Thought content normal.         Judgment: Judgment normal.

## 2024-02-01 ENCOUNTER — PATIENT MESSAGE (OUTPATIENT)
Dept: OBGYN CLINIC | Facility: MEDICAL CENTER | Age: 29
End: 2024-02-01

## 2024-02-01 NOTE — PROGRESS NOTES
Problem List Items Addressed This Visit          Endocrine    Thyroid disease affecting pregnancy     Lab Results   Component Value Date    [id number] 1.155 11/16/2023     Cont the synthroid dose.             Other    37 weeks gestation of pregnancy - Primary     NIPT low risk          Elevated blood pressure affecting pregnancy in third trimester, antepartum     12/21- single elevated BP of 142 with normal labs no repeated labs

## 2024-02-02 ENCOUNTER — HOSPITAL ENCOUNTER (OUTPATIENT)
Facility: HOSPITAL | Age: 29
Discharge: HOME | End: 2024-02-02
Attending: OBSTETRICS & GYNECOLOGY | Admitting: OBSTETRICS & GYNECOLOGY
Payer: COMMERCIAL

## 2024-02-02 ENCOUNTER — PATIENT MESSAGE (OUTPATIENT)
Dept: OBGYN CLINIC | Facility: MEDICAL CENTER | Age: 29
End: 2024-02-02

## 2024-02-02 VITALS
HEART RATE: 93 BPM | HEIGHT: 63 IN | DIASTOLIC BLOOD PRESSURE: 62 MMHG | TEMPERATURE: 98.1 F | RESPIRATION RATE: 18 BRPM | BODY MASS INDEX: 36.96 KG/M2 | SYSTOLIC BLOOD PRESSURE: 112 MMHG | WEIGHT: 208.6 LBS

## 2024-02-02 DIAGNOSIS — R51.9 HEADACHE: Primary | ICD-10-CM

## 2024-02-02 PROBLEM — G44.89 OTHER HEADACHE SYNDROME: Status: ACTIVE | Noted: 2024-02-02

## 2024-02-02 LAB
ALBUMIN SERPL BCP-MCNC: 3.5 G/DL (ref 3.5–5)
ALP SERPL-CCNC: 134 U/L (ref 34–104)
ALT SERPL W P-5'-P-CCNC: 19 U/L (ref 7–52)
ANION GAP SERPL CALCULATED.3IONS-SCNC: 7 MMOL/L
AST SERPL W P-5'-P-CCNC: 20 U/L (ref 13–39)
BILIRUB SERPL-MCNC: 0.56 MG/DL (ref 0.2–1)
BUN SERPL-MCNC: 7 MG/DL (ref 5–25)
CALCIUM SERPL-MCNC: 9.2 MG/DL (ref 8.4–10.2)
CHLORIDE SERPL-SCNC: 105 MMOL/L (ref 96–108)
CO2 SERPL-SCNC: 22 MMOL/L (ref 21–32)
CREAT SERPL-MCNC: 0.43 MG/DL (ref 0.6–1.3)
CREAT UR-MCNC: 47.9 MG/DL
ERYTHROCYTE [DISTWIDTH] IN BLOOD BY AUTOMATED COUNT: 13.6 % (ref 11.6–15.1)
GFR SERPL CREATININE-BSD FRML MDRD: 138 ML/MIN/1.73SQ M
GLUCOSE SERPL-MCNC: 108 MG/DL (ref 65–140)
HCT VFR BLD AUTO: 36 % (ref 34.8–46.1)
HGB BLD-MCNC: 11.5 G/DL (ref 11.5–15.4)
MCH RBC QN AUTO: 27.3 PG (ref 26.8–34.3)
MCHC RBC AUTO-ENTMCNC: 31.9 G/DL (ref 31.4–37.4)
MCV RBC AUTO: 86 FL (ref 82–98)
PLATELET # BLD AUTO: 164 THOUSANDS/UL (ref 149–390)
PMV BLD AUTO: 11.1 FL (ref 8.9–12.7)
POTASSIUM SERPL-SCNC: 3.7 MMOL/L (ref 3.5–5.3)
PROT SERPL-MCNC: 6.5 G/DL (ref 6.4–8.4)
PROT UR-MCNC: 8 MG/DL
PROT/CREAT UR: 0.17 MG/G{CREAT} (ref 0–0.1)
RBC # BLD AUTO: 4.21 MILLION/UL (ref 3.81–5.12)
SODIUM SERPL-SCNC: 134 MMOL/L (ref 135–147)
WBC # BLD AUTO: 7.81 THOUSAND/UL (ref 4.31–10.16)

## 2024-02-02 PROCEDURE — 80053 COMPREHEN METABOLIC PANEL: CPT | Performed by: OBSTETRICS & GYNECOLOGY

## 2024-02-02 PROCEDURE — 84156 ASSAY OF PROTEIN URINE: CPT | Performed by: OBSTETRICS & GYNECOLOGY

## 2024-02-02 PROCEDURE — 85027 COMPLETE CBC AUTOMATED: CPT | Performed by: OBSTETRICS & GYNECOLOGY

## 2024-02-02 PROCEDURE — 99213 OFFICE O/P EST LOW 20 MIN: CPT

## 2024-02-02 PROCEDURE — NC001 PR NO CHARGE: Performed by: OBSTETRICS & GYNECOLOGY

## 2024-02-02 PROCEDURE — 82570 ASSAY OF URINE CREATININE: CPT | Performed by: OBSTETRICS & GYNECOLOGY

## 2024-02-02 RX ORDER — METOCLOPRAMIDE 10 MG/1
10 TABLET ORAL ONCE
Status: COMPLETED | OUTPATIENT
Start: 2024-02-02 | End: 2024-02-02

## 2024-02-02 RX ORDER — METOCLOPRAMIDE 10 MG/1
10 TABLET ORAL 3 TIMES DAILY PRN
Qty: 30 TABLET | Refills: 0 | Status: SHIPPED | OUTPATIENT
Start: 2024-02-02

## 2024-02-02 RX ORDER — CAFFEINE 200 MG
200 TABLET ORAL ONCE
Status: COMPLETED | OUTPATIENT
Start: 2024-02-02 | End: 2024-02-02

## 2024-02-02 RX ADMIN — CAFFEINE 200 MG: 200 TABLET ORAL at 11:57

## 2024-02-02 RX ADMIN — SODIUM CHLORIDE, SODIUM LACTATE, POTASSIUM CHLORIDE, AND CALCIUM CHLORIDE 1000 ML: .6; .31; .03; .02 INJECTION, SOLUTION INTRAVENOUS at 11:50

## 2024-02-02 RX ADMIN — METOCLOPRAMIDE 10 MG: 10 TABLET ORAL at 11:57

## 2024-02-02 NOTE — PROGRESS NOTES
Obstetrics Triage  Mala Jaramillo 28 y.o. female MRN: [id number]  Unit/Bed#: L&D 329-01 Encounter: [id number]      Assessment/Plan:  28 y.o.  at 37w6d.    No evidence of preeclampsia by blood pressure or laboratory criteria. Serial BP range 111-120/56-63.     Headache improved with 1LV IV fluid bolus, PO Reglan and Caffeine. Will send prescription her pharmacy for daily Magnesium and as needed Reglan. Encouraged adequate hydration.     Normal vaginal discharge with no evidence of rupture of membranes. Reactive NST. Not in labor.      Discharge instructions  Patient instructed to call if experiencing worsening contractions, vaginal bleeding, loss of fluid or decreased fetal movement.    She will follow up with her OBGYN on 24.    Plan of care discussed with Dr. Guerrero.    Subjective:  Estimated Date of Delivery: 24    HPI Chronology:  28 y.o.  at 37w6d presents for evaluation of a headache, vaginal discharge and cramping.    Her headache is located in the frontal/temporal region of her head. Rated as 5/10. She was prone to menstrual migraines pre-pregnancy. Used Topamax before pregnancy. Has been using Tylenol with no relief/improvement of current headache that started this past Wednesday.  2 recent instances of visual changes: last Saturday and a couple days ago where she had blurry vision and could not read a menu or see 20ft ahead. Currently, vision is at baseline. She wears corrective lenses but says her prescription is not strong. Does have a small amount of lower extremity swelling but denies other symptoms of preeclampsia including RUQ abdominal pain, chest pain, SOB.    She has been cramping for the last 2 weeks. They come and go. Previously told she was 1cm. Has discharge but no odor or itching. Does not think she broke her water.    Normal fetal movement.    Review of Systems  12-point ROS negative unless stated in the HPI.    Objective:  Vitals:   /62 (BP Location: Right arm)   " Pulse 93   Temp 98.1 °F (36.7 °C) (Oral)   Resp 18   Ht 5' 3\" (1.6 m)   Wt 94.6 kg (208 lb 9.6 oz)   LMP 05/13/2023   BMI 36.95 kg/m²   Body mass index is 36.95 kg/m².    Physical Exam  GEN:  alert and oriented x 3, no apparent distress, appears well  CARDIAC: regular rate and rhythm  PULMONARY: CTA bilaterally  ABDOMEN: gravid, soft, no tenderness  GENITOURINARY: normal external female genitalia. No pooling. Creamy white discharge throughout the vaginal vault. Normal cervix.  Cervix 1/50/-2, posterior and medium consistency  EXTREMITIES: non-tender, trace bilateral pedal edema  FETAL ASSESSMENT:  FHT: Baseline Rate (FHR): 145 bpm  Variability: Moderate  Accelerations: 15 x 15 or greater  Decelerations: None  TOCO: Contraction Frequency (minutes): irreg  Contraction Duration (seconds): 40-60  Contraction Intensity: Mild    Microscopy Slides: negative for ferning, clue cells, hyphae/yeast    Labs:   Recent Results (from the past 24 hour(s))   CBC and Platelet    Collection Time: 02/02/24 11:47 AM   Result Value Ref Range    WBC 7.81 4.31 - 10.16 Thousand/uL    RBC 4.21 3.81 - 5.12 Million/uL    Hemoglobin 11.5 11.5 - 15.4 g/dL    Hematocrit 36.0 34.8 - 46.1 %    MCV 86 82 - 98 fL    MCH 27.3 26.8 - 34.3 pg    MCHC 31.9 31.4 - 37.4 g/dL    RDW 13.6 11.6 - 15.1 %    Platelets 164 149 - 390 Thousands/uL    MPV 11.1 8.9 - 12.7 fL   Comprehensive metabolic panel    Collection Time: 02/02/24 11:47 AM   Result Value Ref Range    Sodium 134 (L) 135 - 147 mmol/L    Potassium 3.7 3.5 - 5.3 mmol/L    Chloride 105 96 - 108 mmol/L    CO2 22 21 - 32 mmol/L    ANION GAP 7 mmol/L    BUN 7 5 - 25 mg/dL    Creatinine 0.43 (L) 0.60 - 1.30 mg/dL    Glucose 108 65 - 140 mg/dL    Calcium 9.2 8.4 - 10.2 mg/dL    AST 20 13 - 39 U/L    ALT 19 7 - 52 U/L    Alkaline Phosphatase 134 (H) 34 - 104 U/L    Total Protein 6.5 6.4 - 8.4 g/dL    Albumin 3.5 3.5 - 5.0 g/dL    Total Bilirubin 0.56 0.20 - 1.00 mg/dL    eGFR 138 ml/min/1.73sq m "   Protein / creatinine ratio, urine    Collection Time: 02/02/24 11:47 AM   Result Value Ref Range    Creatinine, Ur 47.9 Reference range not established. mg/dL    Protein Urine Random 8 Reference range not established. mg/dL    Prot/Creat Ratio, Ur 0.17 (H) 0.00 - 0.10       Lab, Imaging and other studies: I have personally reviewed pertinent reports.      Demetra Goodrich MD  PGY-IV, OB/GYN  2/2/2024, 12:22 PM

## 2024-02-02 NOTE — PATIENT COMMUNICATION
Contacted patient to discuss symptoms.      She has c/o of HA for >24h and unresolved by tylenol, hot flashes, lightheaded and cramping. +FM, denies definitive cx's. Stated she's been wearing a liner and has noticed some minor yellowish discharge.  BP when checked at home was 108/76.     Discussed with provider on call - patient to go to L&D janice for evaluation.  L&D charge nurse and SOD notified.      Patient agreeable with plan of care and will go to L&D for eval

## 2024-02-05 ENCOUNTER — CLINICAL SUPPORT (OUTPATIENT)
Dept: OBGYN CLINIC | Facility: MEDICAL CENTER | Age: 29
End: 2024-02-05

## 2024-02-05 DIAGNOSIS — Z01.30 BP CHECK: Primary | ICD-10-CM

## 2024-02-05 NOTE — PROGRESS NOTES
Patient present for BP check in setting of HA and visual disturbances. Patient reports having headaches that come and go for the past week. Tylenol does not seem to help most of the time. Patient reports blurry vision this morning for about 10 minutes that resolved. BP at today's visit at: 114/62. Denies any other symptoms. Patient reports headache today to feel like pressure. Patient advised to try Excedrin tension and skip the regular tylenol since Excedrin already has that component. Patient has an appointment with provider tomorrow and will f/u then. Patient advised to call the office with any changes.

## 2024-02-06 ENCOUNTER — ROUTINE PRENATAL (OUTPATIENT)
Dept: OBGYN CLINIC | Facility: MEDICAL CENTER | Age: 29
End: 2024-02-06
Payer: COMMERCIAL

## 2024-02-06 VITALS — DIASTOLIC BLOOD PRESSURE: 64 MMHG | SYSTOLIC BLOOD PRESSURE: 116 MMHG | BODY MASS INDEX: 37.64 KG/M2 | WEIGHT: 212.5 LBS

## 2024-02-06 DIAGNOSIS — Z34.03 ENCOUNTER FOR SUPERVISION OF NORMAL FIRST PREGNANCY IN THIRD TRIMESTER: Primary | ICD-10-CM

## 2024-02-06 DIAGNOSIS — Z3A.38 38 WEEKS GESTATION OF PREGNANCY: ICD-10-CM

## 2024-02-06 PROCEDURE — 99213 OFFICE O/P EST LOW 20 MIN: CPT | Performed by: OBSTETRICS & GYNECOLOGY

## 2024-02-06 NOTE — PATIENT INSTRUCTIONS
Fetal Movement   WHAT YOU NEED TO KNOW:   Fetal movements are the kicks, rolls, and hiccups of your unborn baby. You may start to feel these movements when you are 20 weeks pregnant. The movements grow stronger and more frequent as your baby grows. Fetal movements show that your unborn baby is getting the oxygen and nutrients he or she needs before birth. Fewer fetal movements may signal a problem with your baby's health.  DISCHARGE INSTRUCTIONS:   Follow up with your doctor or obstetrician as directed:  Write down your questions so you remember to ask them during your visits.  Normal fetal movement:  Fetal activity can be described by 4 states, from least to most active. During quiet sleep, your unborn baby may be still for up to 2 hours. During active sleep, he or she kicks, rolls, and moves often. During the quiet awake state, he or she may only move his or her eyes. The active awake state includes strong kicks and rolls.  What affects fetal movement:  You may feel your baby move more after you eat, or after you drink caffeine. You may feel your baby move less while you are more active, such as when you exercise. You may also feel fewer movements if you are obese. Certain medicines can change your baby's movements. Tell your healthcare provider about the medicines you are taking.  Track fetal movements at home:  Fetal movement is most often felt when you lie quietly on your side. Your healthcare provider may ask you to count movements for 2 hours. He or she may ask you to track how long it takes for your baby to move 10 times. Keep a log of your baby's movements.  Contact your doctor or obstetrician if:   It takes longer than usual to feel 10 of your unborn baby's movements.    You do not feel your unborn baby move at least 10 times in 2 hours.    The skin on your hands, feet, and around your eyes is more swollen than usual.    You have a headache for at least 24 hours.    Tiny red dots appear on your  skin.    Your belly is tender when you press on it.    You have questions or concerns about your condition or care.    Return to the emergency department if:   You do not feel your unborn baby move for 12 hours.    You feel cramping or constant pain in your abdomen.    You have heavy bleeding from your vagina.    You have a severe headache and cannot see clearly.    You are having trouble breathing or are vomiting.    You have a seizure.    © Copyright [employer] 2023 Information is for End User's use only and may not be sold, redistributed or otherwise used for commercial purposes.  The above information is an  only. It is not intended as medical advice for individual conditions or treatments. Talk to your doctor, nurse or pharmacist before following any medical regimen to see if it is safe and effective for you.  Early Labor Signs   WHAT YOU NEED TO KNOW:   Early labor signs can happen weeks, days, or hours before your baby is ready to be delivered. You may have false labor signs, which are also called Jose D Tan contractions. False labor is common and may happen several weeks or days before your actual labor. The contractions are not regular, and do not get closer together. The pain is usually mild, does not worsen, and is felt only in front. Jose D Tan contractions may happen later in the day, and stop after you change position, walk, or rest.  DISCHARGE INSTRUCTIONS:   Call 911 for any of the following:   You have heavy vaginal bleeding.    You cannot get to the hospital before the baby starts to come out.    Return to the emergency department if:   You have regular, painful contractions that are less than 5 minutes apart and last 30 to 70 seconds each.    You have a constant trickle or sudden gush of clear fluid from your vagina.    You notice a sudden decrease in your baby's movement.    Contact your obstetrician or healthcare provider if:   You have pain in your lower back or abdomen that  does not get better when you change positions.    You have bloody mucus or show.    You have questions or concerns about your condition or care.    Early Labor signs and symptoms:   Lightening  occurs when your baby drops inside your pelvis. You may feel increased pressure in your pelvis. This may happen a few weeks to a few hours before your labor begins.    Contractions  are cramps and tightening that occur in your uterus to help move the baby through your birth canal. Contractions occur regularly and more often each time. Each one lasts about 30 to 70 seconds, and gets stronger until you deliver your baby. Contractions do not go away with movement. The pain usually starts in your lower back and moves to your abdomen.     Effacement  occurs when your cervix softens and thins, so it can easily open for the baby. You will not be able to feel effacement. Your healthcare provider will examine your cervix for effacement.     Dilation  is widening of your cervix. Your healthcare provider will examine your cervix for dilation. Your cervix may start to dilate weeks before your baby is delivered. Your cervix will be fully opened and ready for delivery when it is dilated to 10 centimeters.     Increased discharge  from your vagina may occur. It may be brown, pink, clear, or slightly bloody. This discharge may also be called bloody show. Bloody show is a mucus plug that forms and blocks your cervix during pregnancy. The discharge may mean that your cervix is opening up and getting ready for delivery.    Rupture of membranes  is a sudden release of clear fluid from your vagina. Ruptured membranes means your water broke. Your healthcare provider may need to break your water if it does not happen on its own.    © Copyright [employer] 2023 Information is for End User's use only and may not be sold, redistributed or otherwise used for commercial purposes.  The above information is an  only. It is not intended as  medical advice for individual conditions or treatments. Talk to your doctor, nurse or pharmacist before following any medical regimen to see if it is safe and effective for you.

## 2024-02-06 NOTE — PROGRESS NOTES
Routine Prenatal Visit  [facility] [address], [place], [place]    Assessment/Plan:  Mala is a 28 y.o. year old  at 38w3d who presents for routine prenatal visit.     1. Encounter for supervision of normal first pregnancy in third trimester    2. 38 weeks gestation of pregnancy          Subjective:     CC: Prenatal care    Mala Jaramillo is a 28 y.o.  female who presents for routine prenatal care at 38w3d.  Pregnancy ROS: no leakage of fluid, pelvic pain, or vaginal bleeding.  good fetal movement.  Irregular contractions  Will schedule for IOL at 39 weeks    The following portions of the patient's history were reviewed and updated as appropriate: allergies, current medications, past family history, past medical history, obstetric history, gynecologic history, past social history, past surgical history and problem list.      Objective:  /64   Wt 96.4 kg (212 lb 8 oz)   LMP 2023   BMI 37.64 kg/m²   Pregravid Weight/BMI: Pregravid weight not on file (BMI Could not be calculated)  Current Weight: 96.4 kg (212 lb 8 oz)   Total Weight Gain: Not found.   Pre-Braden Vitals      Flowsheet Row Most Recent Value   Prenatal Assessment    Fetal Heart Rate 146   Movement Present   Prenatal Vitals    Blood Pressure 116/64   Weight - Scale 96.4 kg (212 lb 8 oz)   Urine Albumin/Glucose    Dilation/Effacement/Station    Vaginal Drainage    Draining Fluid No   Edema    LLE Edema Trace   RLE Edema Trace   Facial Edema Trace             General: Well appearing, no distress  Respiratory: Unlabored breathing  Cardiovascular: Regular rate.  Abdomen: Soft, gravid, nontender  Fundal Height: Appropriate for gestational age.  Extremities: Warm and well perfused.  Non tender.

## 2024-02-08 ENCOUNTER — TELEPHONE (OUTPATIENT)
Dept: OBGYN CLINIC | Facility: MEDICAL CENTER | Age: 29
End: 2024-02-08

## 2024-02-08 ENCOUNTER — HOSPITAL ENCOUNTER (OUTPATIENT)
Facility: HOSPITAL | Age: 29
Discharge: HOME | DRG: 560 | End: 2024-02-08
Attending: OBSTETRICS & GYNECOLOGY | Admitting: OBSTETRICS & GYNECOLOGY
Payer: COMMERCIAL

## 2024-02-08 VITALS
HEART RATE: 99 BPM | SYSTOLIC BLOOD PRESSURE: 115 MMHG | DIASTOLIC BLOOD PRESSURE: 65 MMHG | RESPIRATION RATE: 17 BRPM | TEMPERATURE: 98.8 F

## 2024-02-08 PROBLEM — R10.9 ABDOMINAL PAIN DURING PREGNANCY IN THIRD TRIMESTER: Status: ACTIVE | Noted: 2024-02-08

## 2024-02-08 PROBLEM — O26.893 ABDOMINAL PAIN DURING PREGNANCY IN THIRD TRIMESTER: Status: ACTIVE | Noted: 2024-02-08

## 2024-02-08 PROCEDURE — 99212 OFFICE O/P EST SF 10 MIN: CPT

## 2024-02-08 NOTE — TELEPHONE ENCOUNTER
Pt 38w 5d called stating she's have continuous burst of pelvic pain and pressure that the burst were getting closer together, pt was advised those were contractions, and was advised to go to the L& D in the emergency room

## 2024-02-09 ENCOUNTER — HOSPITAL ENCOUNTER (OUTPATIENT)
Dept: LABOR AND DELIVERY | Facility: HOSPITAL | Age: 29
Discharge: HOME | DRG: 560 | End: 2024-02-09
Payer: COMMERCIAL

## 2024-02-09 ENCOUNTER — HOSPITAL ENCOUNTER (INPATIENT)
Facility: HOSPITAL | Age: 29
LOS: 3 days | Discharge: HOME | DRG: 560 | End: 2024-02-12
Attending: OBSTETRICS & GYNECOLOGY | Admitting: OBSTETRICS & GYNECOLOGY
Payer: COMMERCIAL

## 2024-02-09 DIAGNOSIS — Z3A.38 38 WEEKS GESTATION OF PREGNANCY: Primary | ICD-10-CM

## 2024-02-09 PROCEDURE — 86900 BLOOD TYPING SEROLOGIC ABO: CPT | Performed by: OBSTETRICS & GYNECOLOGY

## 2024-02-09 PROCEDURE — 86850 RBC ANTIBODY SCREEN: CPT | Performed by: OBSTETRICS & GYNECOLOGY

## 2024-02-09 PROCEDURE — 80053 COMPREHEN METABOLIC PANEL: CPT | Performed by: OBSTETRICS & GYNECOLOGY

## 2024-02-09 PROCEDURE — 85027 COMPLETE CBC AUTOMATED: CPT | Performed by: OBSTETRICS & GYNECOLOGY

## 2024-02-09 PROCEDURE — 3E033VJ INTRODUCTION OF OTHER HORMONE INTO PERIPHERAL VEIN, PERCUTANEOUS APPROACH: ICD-10-PCS | Performed by: OBSTETRICS & GYNECOLOGY

## 2024-02-09 PROCEDURE — 86780 TREPONEMA PALLIDUM: CPT | Performed by: OBSTETRICS & GYNECOLOGY

## 2024-02-09 PROCEDURE — 86901 BLOOD TYPING SEROLOGIC RH(D): CPT | Performed by: OBSTETRICS & GYNECOLOGY

## 2024-02-09 PROCEDURE — NC001 PR NO CHARGE: Performed by: OBSTETRICS & GYNECOLOGY

## 2024-02-09 RX ORDER — LEVOTHYROXINE SODIUM 0.03 MG/1
25 TABLET ORAL
Status: DISCONTINUED | OUTPATIENT
Start: 2024-02-10 | End: 2024-02-12 | Stop reason: HOSPADM

## 2024-02-09 RX ORDER — SODIUM CHLORIDE, SODIUM LACTATE, POTASSIUM CHLORIDE, CALCIUM CHLORIDE 600; 310; 30; 20 MG/100ML; MG/100ML; MG/100ML; MG/100ML
125 INJECTION, SOLUTION INTRAVENOUS CONTINUOUS
Status: DISCONTINUED | OUTPATIENT
Start: 2024-02-09 | End: 2024-02-10

## 2024-02-09 RX ORDER — BUPIVACAINE HYDROCHLORIDE 2.5 MG/ML
30 INJECTION, SOLUTION EPIDURAL; INFILTRATION; INTRACAUDAL ONCE AS NEEDED
Status: DISCONTINUED | OUTPATIENT
Start: 2024-02-09 | End: 2024-02-10

## 2024-02-09 RX ORDER — ALBUTEROL SULFATE 90 UG/1
2 AEROSOL, METERED RESPIRATORY (INHALATION) EVERY 6 HOURS PRN
Status: DISCONTINUED | OUTPATIENT
Start: 2024-02-09 | End: 2024-02-10

## 2024-02-10 ENCOUNTER — ANESTHESIA EVENT (INPATIENT)
Dept: ANESTHESIOLOGY | Facility: HOSPITAL | Age: 29
DRG: 560 | End: 2024-02-10
Payer: COMMERCIAL

## 2024-02-10 ENCOUNTER — ANESTHESIA (INPATIENT)
Dept: ANESTHESIOLOGY | Facility: HOSPITAL | Age: 29
DRG: 560 | End: 2024-02-10
Payer: COMMERCIAL

## 2024-02-10 PROBLEM — Z3A.39 39 WEEKS GESTATION OF PREGNANCY: Status: ACTIVE | Noted: 2023-11-17

## 2024-02-10 PROBLEM — Z28.39 MATERNAL VARICELLA, NON-IMMUNE: Status: ACTIVE | Noted: 2024-02-10

## 2024-02-10 PROBLEM — O09.899 MATERNAL VARICELLA, NON-IMMUNE: Status: ACTIVE | Noted: 2024-02-10

## 2024-02-10 LAB
ABO GROUP BLD: NORMAL
ABO GROUP BLD: NORMAL
ALBUMIN SERPL BCP-MCNC: 3.4 G/DL (ref 3.5–5)
ALP SERPL-CCNC: 141 U/L (ref 34–104)
ALT SERPL W P-5'-P-CCNC: 24 U/L (ref 7–52)
ANION GAP SERPL CALCULATED.3IONS-SCNC: 7 MMOL/L
AST SERPL W P-5'-P-CCNC: 23 U/L (ref 13–39)
BASE EXCESS BLDCOA CALC-SCNC: -7.8 MMOL/L (ref 3–11)
BASE EXCESS BLDCOV CALC-SCNC: -4.6 MMOL/L (ref 1–9)
BILIRUB SERPL-MCNC: 0.55 MG/DL (ref 0.2–1)
BLD GP AB SCN SERPL QL: NEGATIVE
BUN SERPL-MCNC: 10 MG/DL (ref 5–25)
CALCIUM ALBUM COR SERPL-MCNC: 9.4 MG/DL (ref 8.3–10.1)
CALCIUM SERPL-MCNC: 8.9 MG/DL (ref 8.4–10.2)
CHLORIDE SERPL-SCNC: 105 MMOL/L (ref 96–108)
CO2 SERPL-SCNC: 23 MMOL/L (ref 21–32)
CREAT SERPL-MCNC: 0.52 MG/DL (ref 0.6–1.3)
ERYTHROCYTE [DISTWIDTH] IN BLOOD BY AUTOMATED COUNT: 13.6 % (ref 11.6–15.1)
GFR SERPL CREATININE-BSD FRML MDRD: 130 ML/MIN/1.73SQ M
GLUCOSE SERPL-MCNC: 89 MG/DL (ref 65–140)
HCO3 BLDCOA-SCNC: 20.3 MMOL/L (ref 17.3–27.3)
HCO3 BLDCOV-SCNC: 20.4 MMOL/L (ref 12.2–28.6)
HCT VFR BLD AUTO: 35.5 % (ref 34.8–46.1)
HGB BLD-MCNC: 11.4 G/DL (ref 11.5–15.4)
MCH RBC QN AUTO: 27.8 PG (ref 26.8–34.3)
MCHC RBC AUTO-ENTMCNC: 32.1 G/DL (ref 31.4–37.4)
MCV RBC AUTO: 87 FL (ref 82–98)
O2 CT VFR BLDCOA CALC: 8.3 ML/DL
OXYHGB MFR BLDCOA: 33.9 %
OXYHGB MFR BLDCOV: 73.1 %
PCO2 BLDCOA: 50.4 MM[HG] (ref 30–60)
PCO2 BLDCOV: 37.8 MM HG (ref 27–43)
PH BLDCOA: 7.22 [PH] (ref 7.23–7.43)
PH BLDCOV: 7.35 [PH] (ref 7.19–7.49)
PLATELET # BLD AUTO: 164 THOUSANDS/UL (ref 149–390)
PMV BLD AUTO: 11.1 FL (ref 8.9–12.7)
PO2 BLDCOA: 18 MM HG (ref 5–25)
PO2 BLDCOV: 31.8 MM HG (ref 15–45)
POTASSIUM SERPL-SCNC: 3.8 MMOL/L (ref 3.5–5.3)
PROT SERPL-MCNC: 6.9 G/DL (ref 6.4–8.4)
RBC # BLD AUTO: 4.1 MILLION/UL (ref 3.81–5.12)
RH BLD: POSITIVE
RH BLD: POSITIVE
SAO2 % BLDCOV: 17.7 ML/DL
SODIUM SERPL-SCNC: 135 MMOL/L (ref 135–147)
SPECIMEN EXPIRATION DATE: NORMAL
TREPONEMA PALLIDUM IGG+IGM AB [PRESENCE] IN SERUM OR PLASMA BY IMMUNOASSAY: NORMAL
WBC # BLD AUTO: 9.99 THOUSAND/UL (ref 4.31–10.16)

## 2024-02-10 PROCEDURE — 59409 OBSTETRICAL CARE: CPT | Performed by: OBSTETRICS & GYNECOLOGY

## 2024-02-10 PROCEDURE — 82805 BLOOD GASES W/O2 SATURATION: CPT | Performed by: OBSTETRICS & GYNECOLOGY

## 2024-02-10 PROCEDURE — 0UQMXZZ REPAIR VULVA, EXTERNAL APPROACH: ICD-10-PCS | Performed by: OBSTETRICS & GYNECOLOGY

## 2024-02-10 RX ORDER — ONDANSETRON 2 MG/ML
4 INJECTION INTRAMUSCULAR; INTRAVENOUS EVERY 8 HOURS PRN
Status: DISCONTINUED | OUTPATIENT
Start: 2024-02-10 | End: 2024-02-12 | Stop reason: HOSPADM

## 2024-02-10 RX ORDER — CALCIUM CARBONATE 500 MG/1
1000 TABLET, CHEWABLE ORAL DAILY PRN
Status: DISCONTINUED | OUTPATIENT
Start: 2024-02-10 | End: 2024-02-12 | Stop reason: HOSPADM

## 2024-02-10 RX ORDER — SIMETHICONE 80 MG
80 TABLET,CHEWABLE ORAL 4 TIMES DAILY PRN
Status: DISCONTINUED | OUTPATIENT
Start: 2024-02-10 | End: 2024-02-12 | Stop reason: HOSPADM

## 2024-02-10 RX ORDER — ACETAMINOPHEN 325 MG/1
650 TABLET ORAL EVERY 6 HOURS
Status: DISCONTINUED | OUTPATIENT
Start: 2024-02-10 | End: 2024-02-12 | Stop reason: HOSPADM

## 2024-02-10 RX ORDER — IBUPROFEN 600 MG/1
600 TABLET ORAL EVERY 6 HOURS
Status: DISCONTINUED | OUTPATIENT
Start: 2024-02-10 | End: 2024-02-12 | Stop reason: HOSPADM

## 2024-02-10 RX ORDER — LIDOCAINE HYDROCHLORIDE AND EPINEPHRINE 15; 5 MG/ML; UG/ML
INJECTION, SOLUTION EPIDURAL
Status: COMPLETED | OUTPATIENT
Start: 2024-02-10 | End: 2024-02-10

## 2024-02-10 RX ORDER — OXYTOCIN/RINGER'S LACTATE 30/500 ML
1-30 PLASTIC BAG, INJECTION (ML) INTRAVENOUS
Status: DISCONTINUED | OUTPATIENT
Start: 2024-02-10 | End: 2024-02-10

## 2024-02-10 RX ORDER — OXYTOCIN/RINGER'S LACTATE 30/500 ML
250 PLASTIC BAG, INJECTION (ML) INTRAVENOUS ONCE
Status: DISCONTINUED | OUTPATIENT
Start: 2024-02-10 | End: 2024-02-12 | Stop reason: HOSPADM

## 2024-02-10 RX ORDER — ROPIVACAINE HYDROCHLORIDE 2 MG/ML
INJECTION, SOLUTION EPIDURAL; INFILTRATION; PERINEURAL CONTINUOUS PRN
Status: DISCONTINUED | OUTPATIENT
Start: 2024-02-10 | End: 2024-02-10 | Stop reason: HOSPADM

## 2024-02-10 RX ORDER — BENZOCAINE/MENTHOL 6 MG-10 MG
1 LOZENGE MUCOUS MEMBRANE DAILY PRN
Status: DISCONTINUED | OUTPATIENT
Start: 2024-02-10 | End: 2024-02-12 | Stop reason: HOSPADM

## 2024-02-10 RX ADMIN — LEVOTHYROXINE SODIUM 25 MCG: 25 TABLET ORAL at 05:48

## 2024-02-10 RX ADMIN — SODIUM CHLORIDE, SODIUM LACTATE, POTASSIUM CHLORIDE, AND CALCIUM CHLORIDE 125 ML/HR: .6; .31; .03; .02 INJECTION, SOLUTION INTRAVENOUS at 14:07

## 2024-02-10 RX ADMIN — BENZOCAINE AND LEVOMENTHOL 1 APPLICATION: 200; 5 SPRAY TOPICAL at 23:54

## 2024-02-10 RX ADMIN — Medication 2 MILLI-UNITS/MIN: at 05:34

## 2024-02-10 RX ADMIN — WITCH HAZEL 1 PAD: 500 SOLUTION RECTAL; TOPICAL at 23:54

## 2024-02-10 RX ADMIN — IBUPROFEN 600 MG: 600 TABLET, FILM COATED ORAL at 23:53

## 2024-02-10 RX ADMIN — SODIUM CHLORIDE, SODIUM LACTATE, POTASSIUM CHLORIDE, AND CALCIUM CHLORIDE 125 ML/HR: .6; .31; .03; .02 INJECTION, SOLUTION INTRAVENOUS at 00:00

## 2024-02-10 RX ADMIN — LIDOCAINE HYDROCHLORIDE AND EPINEPHRINE 5 ML: 15; 5 INJECTION, SOLUTION EPIDURAL at 04:42

## 2024-02-10 RX ADMIN — ROPIVACAINE HYDROCHLORIDE 10 ML/HR: 2 INJECTION EPIDURAL; INFILTRATION; PERINEURAL at 04:42

## 2024-02-10 RX ADMIN — ACETAMINOPHEN 325MG 650 MG: 325 TABLET ORAL at 23:53

## 2024-02-10 RX ADMIN — MORPHINE SULFATE 2 MG: 2 INJECTION, SOLUTION INTRAMUSCULAR; INTRAVENOUS at 02:26

## 2024-02-10 RX ADMIN — ROPIVACAINE HYDROCHLORIDE: 2 INJECTION, SOLUTION EPIDURAL; INFILTRATION at 13:24

## 2024-02-10 NOTE — H&P
H & P- Obstetrics   Mala Jaramillo 28 y.o. female MRN: [id number]  Unit/Bed#: L&D 325-01 Encounter: [id number]      Assessment/Plan:    Mala is a 28 y.o.  at 39w0d admitted for an induction of labor    SVE: Cervical Dilation: 1  Cervical Effacement: 50  Fetal Station: -2  Method: Manual  OB Examiner: Suzi    Maternal varicella, non-immune  Assessment & Plan  Plan for Varivax PP    Elevated BP without diagnosis of hypertension  Assessment & Plan  Elevated BP at 31 weeks  Monitor BP during admission, if pt has another elevated BP, will meet criteria for gHTN    Asthma affecting pregnancy in third trimester  Assessment & Plan  Home meds ordered    Thyroid disease affecting pregnancy  Assessment & Plan  Home synthroid ordered    * 39 weeks gestation of pregnancy  Assessment & Plan  Admit to OBGYN   Clear liquid diet   F/u T&S, CBC, RPR   IVF LR 125cc/hr   Continuous fetal monitoring and tocometry   Analgesia at maternal request   Vertex by TAUS  Induction plan brennan after midnight               Patient of: [facility]  This patient will be an INPATIENT  and I certify the anticipated length of stay is >2 Midnights  Discussed with Dr. Butterfield      SUBJECTIVE:    Chief Complaint: I am here for my induction    HPI: Mala Jaramillo is a 28 y.o.  with an FRANK of 2024, Date entered prior to episode creation at 38w6d who is being admitted for eIOL. She denies having uterine contractions, has no LOF, and reports no VB. She states she has felt good FM.      Pregnancy Plan:  Pregnancy: Valadez  Fetal sex: Male  Support person: Jus     Delivery Plans  Planned delivery method: Vaginal  Planned delivery location: [facility] L&D  Planned anesthesia: Epidural  Acceptable blood products: All     Post-Delivery Plans  Feeding intentions: Breast Milk  Circumcision requested: No  Planned birth control: Rhythm      Patient Active Problem List   Diagnosis    Allergic rhinitis    Generalized anxiety disorder    Acute  "nonintractable headache    Thyroid disease affecting pregnancy    Asthma affecting pregnancy in third trimester    39 weeks gestation of pregnancy    Round ligament pain    Elevated blood pressure affecting pregnancy in third trimester, antepartum    Elevated BP without diagnosis of hypertension    COVID-19 affecting pregnancy in third trimester    Other headache syndrome    Abdominal pain during pregnancy in third trimester    Maternal varicella, non-immune       OB History    Para Term  AB Living   1 0 0 0 0 0   SAB IAB Ectopic Multiple Live Births   0 0 0 0 0      # Outcome Date GA Lbr Patric/2nd Weight Sex Delivery Anes PTL Lv   1 Current                Past Medical History:   Diagnosis Date    Anxiety     Asthma     Disease of thyroid gland     Enteritis 12/10/2018    Migraines     Pancytopenia (HCC) 2018       Past Surgical History:   Procedure Laterality Date    EXTERNAL EAR SURGERY         Social History     Tobacco Use    Smoking status: Never    Smokeless tobacco: Never   Substance Use Topics    Alcohol use: Not Currently     Comment: social        No Known Allergies    Medications Prior to Admission   Medication    albuterol (PROVENTIL HFA,VENTOLIN HFA) 90 mcg/act inhaler    ferrous sulfate 324 (65 Fe) mg    levothyroxine 25 mcg tablet    Magnesium 400 MG CAPS    metoclopramide (Reglan) 10 mg tablet    Prenatal Vit-Fe Fumarate-FA (PRENATAL VITAMIN PO)           OBJECTIVE:  Vitals:     Body mass index is 37.64 kg/m².     Physical Exam:  General: Well appearing, no distress  Respiratory: Unlabored breathing  Cardiovascular: Regular rate.  Abdomen: Soft, gravid, nontender  Fundal Height: Appropriate for gestational age.  Extremities: Warm and well perfused.  Non tender.  Psychiatric: Behavioral normal        FHT:   130s reactive    TOCO:    Q3-5min      Prenatal Labs: Blood Type: No results found for: \"ABO\"  , D (Rh type): No results found for: \"RH\"  , 1 hour Glucola:   Lab Results " "  Component Value Date/Time    Glucose 93 11/16/2023 11:06 AM   , Varicella:   Lab Results   Component Value Date/Time    Varicella IgG NON-IMMUNE (A) 11/13/2023 11:12 AM    Varicella IgG IMMUNE 07/23/2020 11:31 AM       , Rubella:   Lab Results   Component Value Date/Time    Rubella IgG Quant 34.7 11/13/2023 11:12 AM        , VDRL/RPR: No results found for: \"RPR\"   , Hep B:   Lab Results   Component Value Date/Time    Hepatitis B Surface Ag Non-reactive 07/21/2023 12:00 AM    Hepatitis B Surface Ag Non-reactive 12/12/2018 05:17 AM     , Hep C: No components found for: \"HEPCSAG\", \"EXTHEPCSAG\"   , HIV:   Lab Results   Component Value Date/Time    HIV-1/HIV-2 Ab Non-Reactive 03/09/2021 07:11 AM    HIV-1/HIV-2 AB Non-Reactive 07/21/2023 12:00 AM     , Chlamydia: No results found for: \"EXTCHLAMYDIA\"  , Gonorrhea: No results found for: \"LABNGO\"  , Group B Strep:    Lab Results   Component Value Date/Time    Strep Grp B PCR Negative 01/24/2024 11:24 AM        RPR: neg  Hep C: neg    Lili Archer MD  2/10/2024  12:16 AM      "

## 2024-02-10 NOTE — OB LABOR/OXYTOCIN SAFETY PROGRESS
Oxytocin Safety Progress Check Note - Mala Jaramillo 28 y.o. female MRN: [id number]    Unit/Bed#: L&D 325-01 Encounter: [id number]    Dose (lily-units/min) Oxytocin: 18 lily-units/min  Contraction Frequency (minutes): 1-3.5  Contraction Intensity: Mild/Moderate  Uterine Activity Characteristics: Coupling  Cervical Dilation: 4        Cervical Effacement: 70  Fetal Station: -2  Baseline Rate (FHR): 120 bpm  Fetal Heart Rate (FHT): 140 BPM  FHR Category: I               Vital Signs:   Vitals:    02/10/24 1419   BP: 115/58   Pulse: 72   Resp:    Temp:        Notes/comments:   Patient is comfortable with her epidural, not feeling her contractions.  She does have constant pressure in her tailbone like she needs to poop.  SVE as above.  Discussed with patient that she is not fully transition to labor yet and minimal or no cervical change in latent labor is expected.  FHT category 1.  Uterine contractions are regular at times, coupling at other times.  Plan to continue Pitocin titration.  Discussed with Dr. Espinosa.     Patricia Harris MD 2/10/2024 2:44 PM

## 2024-02-10 NOTE — PLAN OF CARE
Problem: ANTEPARTUM  Goal: Maintain pregnancy as long as maternal and/or fetal condition is stable  Description: INTERVENTIONS:  - Maternal surveillance  - Fetal surveillance  - Monitor uterine activity  - Medications as ordered  - Bedrest  Outcome: Progressing     Problem: BIRTH - VAGINAL/ SECTION  Goal: Fetal and maternal status remain reassuring during the birth process  Description: INTERVENTIONS:  - Monitor vital signs  - Monitor fetal heart rate  - Monitor uterine activity  - Monitor labor progression (vaginal delivery)  - DVT prophylaxis  - Antibiotic prophylaxis  Outcome: Progressing  Goal: Emotionally satisfying birthing experience for mother/fetus  Description: Interventions:  - Assess, plan, implement and evaluate the nursing care given to the patient in labor  - Advocate the philosophy that each childbirth experience is a unique experience and support the family's chosen level of involvement and control during the labor process   - Actively participate in both the patient's and family's teaching of the birth process  - Consider cultural, Rastafarian and age-specific factors and plan care for the patient in labor  Outcome: Progressing     Problem: POSTPARTUM  Goal: Experiences normal postpartum course  Description: INTERVENTIONS:  - Monitor maternal vital signs  - Assess uterine involution and lochia  Outcome: Progressing  Goal: Appropriate maternal -  bonding  Description: INTERVENTIONS:  - Identify family support  - Assess for appropriate maternal/infant bonding   -Encourage maternal/infant bonding opportunities  - Referral to  or  as needed  Outcome: Progressing  Goal: Establishment of infant feeding pattern  Description: INTERVENTIONS:  - Assess breast/bottle feeding  - Refer to lactation as needed  Outcome: Progressing  Goal: Incision(s), wounds(s) or drain site(s) healing without S/S of infection  Description: INTERVENTIONS  - Assess and document dressing,  incision, wound bed, drain sites and surrounding tissue  - Provide patient and family education  - Perform skin care/dressing changes every   Outcome: Progressing     Problem: PAIN - ADULT  Goal: Verbalizes/displays adequate comfort level or baseline comfort level  Description: Interventions:  - Encourage patient to monitor pain and request assistance  - Assess pain using appropriate pain scale  - Administer analgesics based on type and severity of pain and evaluate response  - Implement non-pharmacological measures as appropriate and evaluate response  - Consider cultural and social influences on pain and pain management  - Notify physician/advanced practitioner if interventions unsuccessful or patient reports new pain  Outcome: Progressing     Problem: INFECTION - ADULT  Goal: Absence or prevention of progression during hospitalization  Description: INTERVENTIONS:  - Assess and monitor for signs and symptoms of infection  - Monitor lab/diagnostic results  - Monitor all insertion sites, i.e. indwelling lines, tubes, and drains  - Monitor endotracheal if appropriate and nasal secretions for changes in amount and color  - [place] appropriate cooling/warming therapies per order  - Administer medications as ordered  - Instruct and encourage patient and family to use good hand hygiene technique  - Identify and instruct in appropriate isolation precautions for identified infection/condition  Outcome: Progressing  Goal: Absence of fever/infection during neutropenic period  Description: INTERVENTIONS:  - Monitor WBC    Outcome: Progressing     Problem: SAFETY ADULT  Goal: Patient will remain free of falls  Description: INTERVENTIONS:  - Educate patient/family on patient safety including physical limitations  - Instruct patient to call for assistance with activity   - Consult OT/PT to assist with strengthening/mobility   - Keep Call bell within reach  - Keep bed low and locked with side rails adjusted as appropriate  -  Keep care items and personal belongings within reach  - Initiate and maintain comfort rounds  - Make Fall Risk Sign visible to staff  - Offer Toileting every  Hours, in advance of need  - Initiate/Maintain alarm  - Obtain necessary fall risk management equipment:   - Apply yellow socks and bracelet for high fall risk patients  - Consider moving patient to room near nurses station  Outcome: Progressing  Goal: Maintain or return to baseline ADL function  Description: INTERVENTIONS:  -  Assess patient's ability to carry out ADLs; assess patient's baseline for ADL function and identify physical deficits which impact ability to perform ADLs (bathing, care of mouth/teeth, toileting, grooming, dressing, etc.)  - Assess/evaluate cause of self-care deficits   - Assess range of motion  - Assess patient's mobility; develop plan if impaired  - Assess patient's need for assistive devices and provide as appropriate  - Encourage maximum independence but intervene and supervise when necessary  - Involve family in performance of ADLs  - Assess for home care needs following discharge   - Consider OT consult to assist with ADL evaluation and planning for discharge  - Provide patient education as appropriate  Outcome: Progressing  Goal: Maintains/Returns to pre admission functional level  Description: INTERVENTIONS:  - Perform AM-PAC 6 Click Basic Mobility/ Daily Activity assessment daily.  - Set and communicate daily mobility goal to care team and patient/family/caregiver.   - Collaborate with rehabilitation services on mobility goals if consulted  - Perform Range of Motion  times a day.  - Reposition patient every  hours.  - Dangle patient  times a day  - Stand patient  times a day  - Ambulate patient  times a day  - Out of bed to chair  times a day   - Out of bed for meals  times a day  - Out of bed for toileting  - Record patient progress and toleration of activity level   Outcome: Progressing     Problem: Knowledge Deficit  Goal:  Patient/family/caregiver demonstrates understanding of disease process, treatment plan, medications, and discharge instructions  Description: Complete learning assessment and assess knowledge base.  Interventions:  - Provide teaching at level of understanding  - Provide teaching via preferred learning methods  Outcome: Progressing     Problem: DISCHARGE PLANNING  Goal: Discharge to home or other facility with appropriate resources  Description: INTERVENTIONS:  - Identify barriers to discharge w/patient and caregiver  - Arrange for needed discharge resources and transportation as appropriate  - Identify discharge learning needs (meds, wound care, etc.)  - Arrange for interpretive services to assist at discharge as needed  - Refer to Case Management Department for coordinating discharge planning if the patient needs post-hospital services based on physician/advanced practitioner order or complex needs related to functional status, cognitive ability, or social support system  Outcome: Progressing

## 2024-02-10 NOTE — ANESTHESIA PROCEDURE NOTES
Epidural Block    Patient location during procedure: OB/L&D  Start time: 2/10/2024 4:35 AM  Reason for block: procedure for pain  Staffing  Performed by: Deacon Kelly DO  Authorized by: Deacon Kelly DO    Preanesthetic Checklist  Completed: patient identified, IV checked, site marked, risks and benefits discussed, surgical consent, monitors and equipment checked, pre-op evaluation and timeout performed  Epidural  Patient position: sitting  Prep: ChloraPrep  Sedation Level: no sedation  Patient monitoring: frequent blood pressure checks, continuous pulse oximetry and heart rate  Approach: midline  Location: lumbar, L3-4  Injection technique: JUSTINA saline  Needle  Needle type: Tuohy   Needle gauge: 18 G  Needle insertion depth: 8 cm  Catheter type: multi-orifice  Catheter size: 20 G  Catheter at skin depth: 4 cm  Catheter securement method: tape  Test dose: negativelidocaine-epinephrine (XYLOCAINE-MPF/EPINEPHRINE) 1.5 %-1:200,000 injection 3 mL - Epidural   5 mL - 2/10/2024 4:42:00 AM  Assessment  Sensory level: T10  Number of attempts: 1negative aspiration for CSF, negative aspiration for heme and no paresthesia on injection  patient tolerated the procedure well with no immediate complications

## 2024-02-10 NOTE — ANESTHESIA PREPROCEDURE EVALUATION
Procedure:  LABOR ANALGESIA    Relevant Problems   GYN   (+) 39 weeks gestation of pregnancy      NEURO/PSYCH   (+) Acute nonintractable headache   (+) Generalized anxiety disorder   (+) Other headache syndrome      PULMONARY   (+) Asthma affecting pregnancy in third trimester        Physical Exam    Airway    Mallampati score: II  TM Distance: >3 FB  Neck ROM: full     Dental       Cardiovascular  Rhythm: regular, Rate: normal, Cardiovascular exam normal    Pulmonary  Pulmonary exam normal Breath sounds clear to auscultation    Other Findings  post-pubertal.      Anesthesia Plan  ASA Score- 2     Anesthesia Type- epidural with ASA Monitors.         Additional Monitors:     Airway Plan:            Plan Factors-Exercise tolerance (METS): >4 METS.    Chart reviewed.   Existing labs reviewed.     Patient is not a current smoker.      Obstructive sleep apnea risk education given perioperatively.        Induction-     Postoperative Plan-     Informed Consent- Anesthetic plan and risks discussed with patient.

## 2024-02-10 NOTE — OB LABOR/OXYTOCIN SAFETY PROGRESS
Oxytocin Safety Progress Check Note - Mala Jaramillo 28 y.o. female MRN: [id number]    Unit/Bed#: L&D 325-01 Encounter: [id number]    Dose (lily-units/min) Oxytocin: 14 lily-units/min  Contraction Frequency (minutes): 1.5-4.5  Contraction Intensity: Mild/Moderate  Uterine Activity Characteristics: Irregular  Cervical Dilation: 3        Cervical Effacement: 60  Fetal Station: -2  Baseline Rate (FHR): 125 bpm  Fetal Heart Rate (FHT): 140 BPM  FHR Category: I               Vital Signs:   Vitals:    02/10/24 1203   BP: 101/55   Pulse: 76   Resp:    Temp:        Notes/comments:   Patient is comfortable at this time. FHT category I currently. Intermittently category II with late decelerations, but resolves with maternal repositioning. Plan to defer SVE at this time and continue pitocin titration. Dr. Francisca meléndez.      Patricia Harris MD 2/10/2024 12:19 PM

## 2024-02-10 NOTE — OB LABOR/OXYTOCIN SAFETY PROGRESS
Oxytocin Safety Progress Check Note - Mala Jaramillo 28 y.o. female MRN: [id number]    Unit/Bed#: L&D 325-01 Encounter: [id number]    Dose (lily-units/min) Oxytocin: 18 lily-units/min  Contraction Frequency (minutes): 1-3  Contraction Intensity: Moderate  Uterine Activity Characteristics: Irregular  Cervical Dilation: 5        Cervical Effacement: 70  Fetal Station: -2  Baseline Rate (FHR): 135 bpm  Fetal Heart Rate (FHT): 140 BPM  FHR Category: I               Vital Signs:   Vitals:    02/10/24 1601   BP: 100/59   Pulse:    Resp:    Temp:        Notes/comments:   Patient is comfortable.  She was recently in high thrown position and repositioned to her left side for comfort.  FHT was category 1.  With position change FHT category 2 with occasional variable and late decelerations.  SVE as above.  Cervix now has soft laborie consistency.  Large amount of fluid expelled on SVE. Plan to continue pitocin titration.     Patricia Harris MD 2/10/2024 5:01 PM

## 2024-02-10 NOTE — OB LABOR/OXYTOCIN SAFETY PROGRESS
Labor Progress Note - Mala Jaramillo 28 y.o. female MRN: [id number]    Unit/Bed#: L&D 325-01 Encounter: [id number]       Contraction Frequency (minutes): 2-3        Cervical Dilation: 3        Cervical Effacement: 60  Fetal Station: -3  Baseline Rate (FHR): 130 bpm  Fetal Heart Rate (FHT): 140 BPM  FHR Category: Cat I               Vital Signs:   Vitals:    02/10/24 0506   BP: 116/61   Pulse: 83   Temp:        Notes/comments:   [place] balloon came out. SVE as above. Plan to start pitocin. Patient now comfortable with epidural in place.      Lili Archer MD 2/10/2024 5:13 AM

## 2024-02-10 NOTE — ASSESSMENT & PLAN NOTE
Elevated BP at 31 weeks  Monitor BP during admission, if pt has another elevated BP, will meet criteria for gHTN    Systolic (12hrs), Av , Min:102 , Max:110   Diastolic (12hrs), Av, Min:55, Max:63

## 2024-02-10 NOTE — OB LABOR/OXYTOCIN SAFETY PROGRESS
Oxytocin Safety Progress Check Note - Mala Jaramillo 28 y.o. female MRN: [id number]    Unit/Bed#: L&D 325-01 Encounter: [id number]    Dose (lily-units/min) Oxytocin: 10 lily-units/min  Contraction Frequency (minutes): 1-3  Contraction Intensity: Mild/Moderate  Uterine Activity Characteristics: Irregular  Cervical Dilation: 3        Cervical Effacement: 60  Fetal Station: -2  Baseline Rate (FHR): 140 bpm  Fetal Heart Rate (FHT): 140 BPM  FHR Category: I               Vital Signs:   Vitals:    02/10/24 0933   BP: 106/59   Pulse: 78   Resp:    Temp:        Notes/comments:   Patient comfortable status post epidural. Not feeling any pain. More numb on the right than the left. SVE as above. Fetal head well engaged in pelvis. AROM for clear, blood-tinged fluid. No complications, patient and fetus tolerated well. Patient had some late decelerations earlier that resolved with repositioning. Currently category I. Will continue pitocin titration. Dr. Espinosa present for examination.     Patricia Harris MD 2/10/2024 10:13 AM

## 2024-02-10 NOTE — OB LABOR/OXYTOCIN SAFETY PROGRESS
Oxytocin Safety Progress Check Note - Mala Jaramillo 28 y.o. female MRN: [id number]    Unit/Bed#: L&D 325-01 Encounter: [id number]    Dose (lily-units/min) Oxytocin: 4 lily-units/min  Contraction Frequency (minutes): 2-3  Contraction Intensity: Mild     Cervical Dilation: 3        Cervical Effacement: 60  Fetal Station: -3  Baseline Rate (FHR): 130 bpm  Fetal Heart Rate (FHT): 140 BPM  FHR Category: II               Vital Signs:   Vitals:    02/10/24 0635   BP: 122/60   Pulse: 76   Temp:        Notes/comments:   Pt comfortable with epidural. Pt declines check at this time. FHT has been cat I, just had small late decel that returned to baseline. Spon accels with mod variability. Plan to check when patient is ready. Dr. Butterfield aware.     Patricia Harris MD 2/10/2024 7:47 AM

## 2024-02-11 PROCEDURE — 99024 POSTOP FOLLOW-UP VISIT: CPT | Performed by: OBSTETRICS & GYNECOLOGY

## 2024-02-11 RX ORDER — DOCUSATE SODIUM 100 MG/1
100 CAPSULE, LIQUID FILLED ORAL 2 TIMES DAILY
Status: DISCONTINUED | OUTPATIENT
Start: 2024-02-11 | End: 2024-02-12 | Stop reason: HOSPADM

## 2024-02-11 RX ADMIN — ACETAMINOPHEN 325MG 650 MG: 325 TABLET ORAL at 11:48

## 2024-02-11 RX ADMIN — DOCUSATE SODIUM 100 MG: 100 CAPSULE, LIQUID FILLED ORAL at 18:09

## 2024-02-11 RX ADMIN — ACETAMINOPHEN 325MG 650 MG: 325 TABLET ORAL at 18:09

## 2024-02-11 RX ADMIN — ACETAMINOPHEN 325MG 650 MG: 325 TABLET ORAL at 05:54

## 2024-02-11 RX ADMIN — LEVOTHYROXINE SODIUM 25 MCG: 25 TABLET ORAL at 05:54

## 2024-02-11 RX ADMIN — IBUPROFEN 600 MG: 600 TABLET, FILM COATED ORAL at 18:09

## 2024-02-11 RX ADMIN — IBUPROFEN 600 MG: 600 TABLET, FILM COATED ORAL at 23:08

## 2024-02-11 RX ADMIN — IBUPROFEN 600 MG: 600 TABLET, FILM COATED ORAL at 11:48

## 2024-02-11 RX ADMIN — DOCUSATE SODIUM 100 MG: 100 CAPSULE, LIQUID FILLED ORAL at 08:57

## 2024-02-11 RX ADMIN — ACETAMINOPHEN 325MG 650 MG: 325 TABLET ORAL at 23:08

## 2024-02-11 RX ADMIN — IBUPROFEN 600 MG: 600 TABLET, FILM COATED ORAL at 05:54

## 2024-02-11 NOTE — OB LABOR/OXYTOCIN SAFETY PROGRESS
Oxytocin Safety Progress Check Note - Mala Jaramillo 28 y.o. female MRN: [id number]    Unit/Bed#: L&D 325-01 Encounter: [id number]    Dose (lily-units/min) Oxytocin: 18 lily-units/min  Contraction Frequency (minutes): 1-3  Contraction Intensity: Moderate  Uterine Activity Characteristics: Irregular  Cervical Dilation: 5        Cervical Effacement: 70  Fetal Station: -2  Baseline Rate (FHR): 130 bpm  Fetal Heart Rate (FHT): 140 BPM  FHR Category: I               Vital Signs:   Vitals:    02/10/24 1800   BP: 108/54   Pulse:    Resp:    Temp: 99.1 °F (37.3 °C)       Notes/comments:   FHT category 1.  Patient is stacey every 1 to 3 minutes.  Occasionally coupling contractions.  SVE deferred at this time.  Will continue Pitocin titration and plan for next cervical check in 2 hours or when clinically indicated.    Patricia Harris MD 2/10/2024 7:24 PM

## 2024-02-11 NOTE — PLAN OF CARE
Problem: POSTPARTUM  Goal: Experiences normal postpartum course  Description: INTERVENTIONS:  - Monitor maternal vital signs  - Assess uterine involution and lochia  Outcome: Progressing  Goal: Appropriate maternal -  bonding  Description: INTERVENTIONS:  - Identify family support  - Assess for appropriate maternal/infant bonding   -Encourage maternal/infant bonding opportunities  - Referral to  or  as needed  Outcome: Progressing  Goal: Establishment of infant feeding pattern  Description: INTERVENTIONS:  - Assess breast/bottle feeding  - Refer to lactation as needed  Outcome: Progressing  Goal: Incision(s), wounds(s) or drain site(s) healing without S/S of infection  Description: INTERVENTIONS  - Assess and document dressing, incision, wound bed, drain sites and surrounding tissue  - Provide patient and family education  -Outcome: Progressing     Problem: PAIN - ADULT  Goal: Verbalizes/displays adequate comfort level or baseline comfort level  Description: Interventions:  - Encourage patient to monitor pain and request assistance  - Assess pain using appropriate pain scale  - Administer analgesics based on type and severity of pain and evaluate response  - Implement non-pharmacological measures as appropriate and evaluate response  - Consider cultural and social influences on pain and pain management  - Notify physician/advanced practitioner if interventions unsuccessful or patient reports new pain  Outcome: Progressing     Problem: INFECTION - ADULT  Goal: Absence or prevention of progression during hospitalization  Description: INTERVENTIONS:  - Assess and monitor for signs and symptoms of infection  - Monitor lab/diagnostic results  - Monitor all insertion sites, i.e. indwelling lines, tubes, and drains  - Monitor endotracheal if appropriate and nasal secretions for changes in amount and color  - [place] appropriate cooling/warming therapies per order  - Administer  medications as ordered  - Instruct and encourage patient and family to use good hand hygiene technique  - Identify and instruct in appropriate isolation precautions for identified infection/condition  Outcome: Progressing  Goal: Absence of fever/infection during neutropenic period  Description: INTERVENTIONS:  - Monitor WBC    Outcome: Progressing     Problem: DISCHARGE PLANNING  Goal: Discharge to home or other facility with appropriate resources  Description: INTERVENTIONS:  - Identify barriers to discharge w/patient and caregiver  - Arrange for needed discharge resources and transportation as appropriate  - Identify discharge learning needs (meds, wound care, etc.)  - Arrange for interpretive services to assist at discharge as needed  - Refer to Case Management Department for coordinating discharge planning if the patient needs post-hospital services based on physician/advanced practitioner order or complex needs related to functional status, cognitive ability, or social support system  Outcome: Progressing

## 2024-02-11 NOTE — PLAN OF CARE
Problem: POSTPARTUM  Goal: Experiences normal postpartum course  Description: INTERVENTIONS:  - Monitor maternal vital signs  - Assess uterine involution and lochia  Outcome: Progressing  Goal: Appropriate maternal -  bonding  Description: INTERVENTIONS:  - Identify family support  - Assess for appropriate maternal/infant bonding   -Encourage maternal/infant bonding opportunities  - Referral to  or  as needed  Outcome: Progressing  Goal: Establishment of infant feeding pattern  Description: INTERVENTIONS:  - Assess breast/bottle feeding  - Refer to lactation as needed  Outcome: Progressing  Goal: Incision(s), wounds(s) or drain site(s) healing without S/S of infection  Description: INTERVENTIONS  - Assess and document dressing, incision, wound bed, drain sites and surrounding tissue  - Provide patient and family education  Outcome: Progressing     Problem: PAIN - ADULT  Goal: Verbalizes/displays adequate comfort level or baseline comfort level  Description: Interventions:  - Encourage patient to monitor pain and request assistance  - Assess pain using appropriate pain scale  - Administer analgesics based on type and severity of pain and evaluate response  - Implement non-pharmacological measures as appropriate and evaluate response  - Consider cultural and social influences on pain and pain management  - Notify physician/advanced practitioner if interventions unsuccessful or patient reports new pain  Outcome: Progressing     Problem: INFECTION - ADULT  Goal: Absence or prevention of progression during hospitalization  Description: INTERVENTIONS:  - Assess and monitor for signs and symptoms of infection  - Monitor lab/diagnostic results  - Monitor all insertion sites, i.e. indwelling lines, tubes, and drains  - Monitor endotracheal if appropriate and nasal secretions for changes in amount and color  - [place] appropriate cooling/warming therapies per order  - Administer medications  as ordered  - Instruct and encourage patient and family to use good hand hygiene technique  - Identify and instruct in appropriate isolation precautions for identified infection/condition  Outcome: Progressing  Goal: Absence of fever/infection during neutropenic period  Description: INTERVENTIONS:  - Monitor WBC    Outcome: Progressing     Problem: DISCHARGE PLANNING  Goal: Discharge to home or other facility with appropriate resources  Description: INTERVENTIONS:  - Identify barriers to discharge w/patient and caregiver  - Arrange for needed discharge resources and transportation as appropriate  - Identify discharge learning needs (meds, wound care, etc.)  - Arrange for interpretive services to assist at discharge as needed  - Refer to Case Management Department for coordinating discharge planning if the patient needs post-hospital services based on physician/advanced practitioner order or complex needs related to functional status, cognitive ability, or social support system  Outcome: Progressing

## 2024-02-11 NOTE — ANESTHESIA POSTPROCEDURE EVALUATION
Post-Op Assessment Note    CV Status:  Stable    Pain management: adequate      Post-op block assessment: no complications and catheter intact   Mental Status:  Alert and awake   Hydration Status:  Euvolemic   PONV Controlled:  Controlled   Airway Patency:  Patent     Post Op Vitals Reviewed: Yes    No anethesia notable event occurred.    Staff: Anesthesiologist               /59 (02/10/24 2215)    Temp      Pulse     Resp      SpO2

## 2024-02-11 NOTE — DISCHARGE SUMMARY
Discharge Summary - OB/GYN  Mala Jaramillo 28 y.o. female MRN: [id number]  Unit/Bed#: L&D 325-01 Encounter: [id number]    Admission Date: 2024     Discharge Date: 2024    Admitting Attending: Alena Espinosa MD    Delivering Attending: Alena Espinosa MD    Discharging Attending: Dr. Vicky MD    Principal Diagnosis: Pregnancy at 39w0d    Secondary Diagnosis:   1. Asthma  2. Maternal varicella non-immune  3. Thyroid disease  4. Elevated BP without a diagnosis     Procedures: spontaneous vaginal delivery    Anesthesia: epidural    Hospital course: Ms. Mala Jaramillo is a 28 y.o.  at 39w0d. She presented to labor and delivery for elective induction of labor at term. Her pregnancy was complicated by maternal varicella non-immune status, asthma, thyroid disease, elevated BP without a diagnosis of HTN. On exam on arrival she was noted to be 1/50/-3. She was induced with a brennan balloon alone, as she was stacey too much for cytotec. She received an epidural for analgesia. Her brennan balloon was expelled and pitocin was started. Amniotomy was performed for  clear fluid. She progressed to complete and started pushing.      Of note as the patient pushed and the baby's head descended, a transverse vaginal septum was identified.  Patient states she never knew about this.  She denies any issues placing tampons in the past.  The septum was on the patient's left posteriorly and the fetal head toward the tissue while pushing.    She delivered a viable male  on 2/10/2024 at 2125. Weight 8lbs 3.9oz via normal spontaneous vaginal delivery. She sustained vaginal and labial lacerations during delivery which was adequately repaired. Apgars were 8 (1 min) and 9 (5 min).  was transferred to  nursery. Patient tolerated the procedure well.     Her post-delivery course was uncomplicated. Her postpartum pain was well controlled with oral analgesics.    On day of discharge, she was ambulating and able to  reasonably perform all ADLs. She was voiding and had appropriate bowel function. Pain was well controlled. She was discharged home on postpartum day #2 without complications. Patient was instructed to follow up with her OB as an outpatient and was given appropriate warnings to call provider if she develops signs of infection or uncontrolled pain.    Complications: none apparent    Condition at discharge: good     Discharge instructions/Information to patient and family:   See after visit summary for information provided to patient and family.      Provisions for Follow-Up Care:  See after visit summary for information related to follow-up care and any pertinent home health orders.      Disposition: See After Visit Summary for discharge disposition information.    Planned Readmission: No    Discharge medications and instructions:   Please see AVS for full list of medications upon discharge.

## 2024-02-11 NOTE — OB LABOR/OXYTOCIN SAFETY PROGRESS
Oxytocin Safety Progress Check Note - Mala Jaramillo 28 y.o. female MRN: [id number]    Unit/Bed#: L&D 325-01 Encounter: [id number]    Dose (lily-units/min) Oxytocin: 18 lily-units/min  Contraction Frequency (minutes): 1-3  Contraction Intensity: Moderate  Uterine Activity Characteristics: Irregular  Cervical Dilation: Lip/rim (Comment)        Cervical Effacement: 100  Fetal Station: 1  Baseline Rate (FHR): 130 bpm  Fetal Heart Rate (FHT): 140 BPM  FHR Category: I               Vital Signs:   Vitals:    02/10/24 1800   BP: 108/54   Pulse:    Resp:    Temp: 99.1 °F (37.3 °C)       Notes/comments:   Patient is feeling more of her contractions in her belly and having back pain.  She has had her epidural button and tried and a heating pad on her back without relief.  SVE as above with rapid change.  FHT category 1.  Baby palpated to be MCKINLEY.  Will continue Pitocin titration and peanut ball.  Plan to check patient when she is feeling constant pressure, or sooner if clinically indicated. Dr. Espinosa aware.    Patricia Harris MD 2/10/2024 7:53 PM

## 2024-02-11 NOTE — LACTATION NOTE
This note was copied from a baby's chart.  CONSULT - LACTATION  Baby Boy (Stan Jaramillo 1 days male MRN: [id number]    Pending sale to [facility] NURSERY Room / Bed: L&D 309(N)/L&D 309(N) Encounter: [id number]    Maternal Information     MOTHER:  Mala Jaramillo  Maternal Age: 28 y.o.   OB History: # 1 - Date: 02/10/24, Sex: Male, Weight: 3740 g (8 lb 3.9 oz), GA: 39w0d, Delivery: Vaginal, Spontaneous, Apgar1: 8, Apgar5: 9, Living: Living, Birth Comments: None   Previouse breast reduction surgery? No    Lactation history:   Has patient previously breast fed: No   How long had patient previously breast fed:     Previous breast feeding complications:       Past Surgical History:   Procedure Laterality Date    EXTERNAL EAR SURGERY          Birth information:  YOB: 2024   Time of birth: 9:25 PM   Sex: male   Delivery type: Vaginal, Spontaneous   Birth Weight: 3740 g (8 lb 3.9 oz)   Percent of Weight Change: 0%     Gestational Age: 39w0d   [unfilled]    Assessment     Breast and nipple assessment: normal assessment     Assessment: normal assessment    Feeding assessment: feeding well for 1st day with assistance     LATCH:  Latch: Repeated attempts, hold nipple in mouth, stimulate to suck (did better on 2nd breast)   Audible Swallowing: A few with stimulation   Type of Nipple: Everted (After stimulation)   Comfort (Breast/Nipple): Filling, red/small blisters/bruises, mild/moderate discomfort   Hold (Positioning): Partial assist, teach one side, mother does other, staff holds   LATCH Score: 6          Feeding recommendations:  breast feed on demand    Met with mother & father. Provided with Ready, Set, Baby booklet which contained information on:  Hand expression with access to QR codes to review hand expression.  Positioning and latch reviewed as well as showing images of other feeding positions.  Discussed the properties of a good latch in any position.   Feeding on cue and  what that means for recognizing infant's hunger, s/s that baby is getting enough milk and some s/s that breastfeeding dyad may need further help Mom asked for assistance latching baby. Mom had expressing then skin to skin guidance to deep latch. Stimulating to suck. A few short latches with piston sucks on right. Then Burped. Guided to deep latch on left also using football hold for some longer latches with rocker suckling, audible swallowing and breast softening. Mom noted better suckling and less discomfort after latch on tenderness. Dad also shown signs of good latch/feed.  Skin to Skin contact and benefits to mom and baby  Avoidance of pacifiers for the first month discussed.   Gave information on common concerns, what to expect the first few weeks after delivery, preparing for other caregivers, and how partners can help. Resources for support also provided.    Discharge breastfeeding booklet at bedside for review  including the feeding log. Emphasized 8 or more (12) feedings in a 24 hour period, what to expect for the number of diapers per day of life and the progression of properties of the  stooling pattern.    List of reasons to call a lactation consultant.  Feeding logs  Feeding cues  Hand expression  Baby's Second day (cluster feeding)  Breastfeeding and Your Lifestyle (Medications, Alcohol, Caffeine, Smoking, Street Drugs, Methadone)  First Two Weeks Survival Guide for Breastfeeding  Breast Changes  Physical Therapy  Storage and Handling of Breast milk  How to Keep Your Breast Pump Kit Clean  The Employed Breastfeeding Mother  Mixed feeding  Bottle feeding like breastfeeding (paced bottle feeding)  astfeeding and your lifestyle, storage and preparation of breast milk, how to keep you breast pump clean, the employed breastfeeding mother and paced bottle feeding handouts.     Booklet included Breastfeeding Resources for after discharge including access to the number for the [facility]  [place].    Encouraged parents to call for assistance, questions, and concerns about breastfeeding.  Extension provided.        Avril Altamirano RN 2/11/2024 4:34 PM

## 2024-02-11 NOTE — L&D DELIVERY NOTE
Vaginal Delivery Summary - OB/GYN   Mala Jaramillo 28 y.o. female MRN: [id number]  Unit/Bed#: L&D 325-01 Encounter: [id number]      Pre-delivery Diagnosis:   1. Pregnancy at 39 weeks   2. Maternal varicella non-immune  3. Elevated BP without diagnosis of HTN  4. Asthma   5. Thyroid disease      Post-delivery Diagnosis: same, delivered    Procedure:   Spontaneous Vaginal Delivery   Vaginal laceration repair     Attending: Francisca    Assistant(s): Kimberly    Anesthesia: Epidural    QBL: 244 mL    Complications: none apparent    Specimens:   1. Arterial and venous cord gases  2. Cord blood  3. Segment of umbilical cord  4. Placenta to storage     Findings:  1. Viable male on 2/10/2024 at 2125, with APGARS of 8 and 9 at 1 and 5 minutes respectively  2. Spontaneous delivery of intact placenta at 2130  3. Vaginal laceration   4. Transverse vaginal septum identified and torn at the time of delivery   4. Blood gases:   Arterial pH: 7.222   Arterial base excess: -7.8   Venous pH: 7.349   Venous base excess: -4.6    Disposition:  Patient tolerated the procedure well and was recovering in labor and delivery room       Brief history and labor course:  Ms. Mala Jaramillo is a 28 y.o.  at 39w0d. She presented to labor and delivery for elective induction of labor at term. Her pregnancy was complicated by maternal varicella non-immune status, asthma, thyroid disease, elevated BP without a diagnosis of HTN. On exam on arrival she was noted to be 1/50/-3. She was induced with a brennan balloon alone, as she was stacey too much for cytotec. She received an epidural for analgesia. Her brennan balloon was expelled and pitocin was started. Amniotomy was performed for  clear fluid. She progressed to complete and started pushing.     Of note as the patient pushed and the baby's head descended, a transverse vaginal septum was identified.  Patient states she never knew about this.  She denies any issues placing tampons in the past.  The  septum was on the patient's left posteriorly and the fetal head toward the tissue while pushing.    Description of procedure:  After pushing for 73 minutes, at 2125 patient delivered a viable male , wt pending, apgars of 8 (1 min) and 9 (5 min). The fetal vertex delivered spontaneously. There was no nuchal cord. The right anterior shoulder delivered atraumatically with gentle downward traction. The left posterior shoulder delivered with maternal expulsive forces and the assistance of gentle upward traction. The remainder of the fetus delivered spontaneously.     Upon delivery, the infant was placed on the mothers abdomen and the cord was clamped and cut after delayed cord clamping. The infant was noted to cry spontaneously and was moving all extremities appropriately. There was no evidence for injury. Awaiting nurse resuscitators evaluated the . Arterial and venous cord blood gases and cord blood was collected for analysis. These were promptly sent to the lab. In the immediate post-partum, 30 units of IV pitocin was administered, and the uterus was noted to contract down well with massage and pitocin. The placenta delivered spontaneously at 2130 and was noted to have a centrally inserted 3 vessel cord.     The vagina, cervix, perineum, and rectum were inspected and there was noted to be a left vaginal laceration and a left labial laceration.  Under adequate anesthesia, the torn vaginal septum was cut using scissors. The apex of the vaginal laceration was identified and anchoring suture was placed 1 cm above the apex.  The vaginal mucosa was reapproximated using a running locked 3-0 Vicryl stitch.  2 interrupted 3-0 Vicryl stitches were placed in the left labial laceration.  Other abrasions within the vagina were well-approximated and hemostatic and did not require repair. Excellent hemostasis was achieved.     Bimanual exam revealed minimal clots and good uterine tone.  The fundus was firm and at the  level of the umbilicus.  At the conclusion of the procedure, all needle, sponge, and instrument counts were noted to be correct. Patient tolerated the procedure well and was allowed to recover in labor and delivery room with family and  before being transferred to the post-partum floor. Dr. Espinosa was present and participated in all key portions of the case.      Patricia Harris MD  2/10/2024  10:11 PM

## 2024-02-11 NOTE — OB LABOR/OXYTOCIN SAFETY PROGRESS
Oxytocin Safety Progress Check Note - Mala Jaramillo 28 y.o. female MRN: [id number]    Unit/Bed#: L&D 325-01 Encounter: [id number]    Dose (lily-units/min) Oxytocin: 18 lily-units/min  Contraction Frequency (minutes): irregular  Contraction Intensity: Moderate  Uterine Activity Characteristics: Irregular  Cervical Dilation: 10  Dilation Complete Date: 02/10/24  Dilation Complete Time: 2010  Cervical Effacement: 100  Fetal Station: 1  Baseline Rate (FHR): 130 bpm  Fetal Heart Rate (FHT): 140 BPM  FHR Category: I               Vital Signs:   Vitals:    02/10/24 1800   BP: 108/54   Pulse:    Resp:    Temp: 99.1 °F (37.3 °C)       Notes/comments:   Patient is complete and feeling constant pressure. Will start to push. Dr. Francisca meléndez.       Patricia Harris MD 2/10/2024 8:21 PM

## 2024-02-11 NOTE — PROGRESS NOTES
"POSTPARTUM NOTE  Mala Jaramillo 28 y.o. female MRN: [id number]  Unit/Bed#: L&D 309-01 Encounter: [id number]    Mala Jaramillo is a   at postpartum day 1 from a vaginal delivery on 2/10/2024 at 2125.  Baby is at bedside.  Complications included: none    Review of Systems:   -Constitutional: denies issues, ambulating without issue, reports pain currently 1/10   -Breasts: denies tenderness   -Cardiovascular: denies chest pain or SOB   -Gynecologic: lochia moderate   -Urinary: voiding without issue   -GI: tolerating PO, passing flatus, no bowel movement yet    Physical Exam:   -Vitals:   Vitals:    02/10/24 2345 24 0000 24 0001 24 0306   BP: 116/57 117/58 117/58 116/57   BP Location:    Right arm   Pulse:  84  84   Resp:  18  20   Temp:  98.5 °F (36.9 °C)  97.5 °F (36.4 °C)   TempSrc:  Oral  Oral   SpO2:    98%   Weight:  96.4 kg (212 lb 8 oz)     Height:  5' 3\" (1.6 m)        -General: A&Ox3, no acute distress noted   -Pulmonary: normal effort, no SOB noted   -Cardiovascular: regular HR   -Abdomen: soft, non-tender, non-distended, + bowel sounds x4 quadrants, uterine fundus firm @-1 cm below the umbilicus   -Extremities: nontender, +1 BLE edema noted   -Breasts: deferred   -Pelvic exam: deferred    Results from last 7 days   Lab Units 24  2359   HEMOGLOBIN g/dL 11.4*   HEMATOCRIT % 35.5   MCV fL 87       Assessment/Plan:  1. Continue routine postpartum care.  Ambulation and self-care encouraged.  2. Contraception: Declines.  3. Feeding infant via breast.  4. She is Rh positive.  Rhogam is not indicated.  5. Vaccine status:  No vaccines are currently indicated.  6. Anticipate discharge to home tomorrow.    LIZ Nova  2024      "

## 2024-02-12 VITALS
HEIGHT: 63 IN | TEMPERATURE: 97.9 F | DIASTOLIC BLOOD PRESSURE: 60 MMHG | RESPIRATION RATE: 18 BRPM | BODY MASS INDEX: 37.65 KG/M2 | HEART RATE: 72 BPM | WEIGHT: 212.5 LBS | SYSTOLIC BLOOD PRESSURE: 112 MMHG | OXYGEN SATURATION: 98 %

## 2024-02-12 PROCEDURE — 99024 POSTOP FOLLOW-UP VISIT: CPT | Performed by: OBSTETRICS & GYNECOLOGY

## 2024-02-12 PROCEDURE — NC001 PR NO CHARGE: Performed by: OBSTETRICS & GYNECOLOGY

## 2024-02-12 RX ORDER — IBUPROFEN 600 MG/1
600 TABLET ORAL EVERY 6 HOURS
Qty: 30 TABLET | Refills: 0 | Status: SHIPPED | OUTPATIENT
Start: 2024-02-12

## 2024-02-12 RX ORDER — DOCUSATE SODIUM 100 MG/1
100 CAPSULE, LIQUID FILLED ORAL 2 TIMES DAILY
Qty: 60 CAPSULE | Refills: 0 | Status: SHIPPED | OUTPATIENT
Start: 2024-02-12 | End: 2024-03-13

## 2024-02-12 RX ORDER — ACETAMINOPHEN 325 MG/1
650 TABLET ORAL EVERY 6 HOURS PRN
Qty: 30 TABLET | Refills: 0 | Status: SHIPPED | OUTPATIENT
Start: 2024-02-12 | End: 2024-03-13

## 2024-02-12 RX ADMIN — ACETAMINOPHEN 325MG 650 MG: 325 TABLET ORAL at 05:40

## 2024-02-12 RX ADMIN — IBUPROFEN 600 MG: 600 TABLET, FILM COATED ORAL at 05:40

## 2024-02-12 RX ADMIN — DOCUSATE SODIUM 100 MG: 100 CAPSULE, LIQUID FILLED ORAL at 08:30

## 2024-02-12 RX ADMIN — LEVOTHYROXINE SODIUM 25 MCG: 25 TABLET ORAL at 05:40

## 2024-02-12 NOTE — LACTATION NOTE
This note was copied from a baby's chart.  Met with mother to review the Discharge Breastfeeding book, including use of the the feeding log, expected number of feedings and output; breastfeeding and your lifestyle( medications, caffeine, drugs, alcohol use); how to recognize and and remedy at home and when to call the doctor for: breast engorgement, block milked ducts and mastitis; storage and preparation of breast milk; cleaning of bottles and pump parts; paced bottle feeding and how to contact the [facility] as needed.    Mala states that overall breastfeeding is going well, sometimes she notes a more shallow latch but feels most feedings go well, she feels that they are still working on improving latch.    Encouraged Mala to call for latch check with the next feeding.

## 2024-02-12 NOTE — PROGRESS NOTES
All belongings accounted for by patient and S.O. before leaving. Discharge instructions given and reviewed, questions answered. Patient chose to walk from unit escorted by S.ODaija carrying infant secured in car seat and escorted by RENATA Felton

## 2024-02-12 NOTE — PLAN OF CARE
Problem: POSTPARTUM  Goal: Experiences normal postpartum course  Description: INTERVENTIONS:  - Monitor maternal vital signs  - Assess uterine involution and lochia  Outcome: Progressing  Goal: Appropriate maternal -  bonding  Description: INTERVENTIONS:  - Identify family support  - Assess for appropriate maternal/infant bonding   -Encourage maternal/infant bonding opportunities  - Referral to  or  as needed  Outcome: Progressing  Goal: Establishment of infant feeding pattern  Description: INTERVENTIONS:  - Assess breast/bottle feeding  - Refer to lactation as needed  Outcome: Progressing  Goal: Incision(s), wounds(s) or drain site(s) healing without S/S of infection  Description: INTERVENTIONS  - Assess and document dressing, incision, wound bed, drain sites and surrounding tissue  - Provide patient and family education  - Perform skin care/dressing changes every day  Outcome: Progressing     Problem: PAIN - ADULT  Goal: Verbalizes/displays adequate comfort level or baseline comfort level  Description: Interventions:  - Encourage patient to monitor pain and request assistance  - Assess pain using appropriate pain scale  - Administer analgesics based on type and severity of pain and evaluate response  - Implement non-pharmacological measures as appropriate and evaluate response  - Consider cultural and social influences on pain and pain management  - Notify physician/advanced practitioner if interventions unsuccessful or patient reports new pain  Outcome: Progressing     Problem: INFECTION - ADULT  Goal: Absence or prevention of progression during hospitalization  Description: INTERVENTIONS:  - Assess and monitor for signs and symptoms of infection  - Monitor lab/diagnostic results  - Monitor all insertion sites, i.e. indwelling lines, tubes, and drains  - Monitor endotracheal if appropriate and nasal secretions for changes in amount and color  - [place] appropriate  cooling/warming therapies per order  - Administer medications as ordered  - Instruct and encourage patient and family to use good hand hygiene technique  - Identify and instruct in appropriate isolation precautions for identified infection/condition  Outcome: Progressing  Goal: Absence of fever/infection during neutropenic period  Description: INTERVENTIONS:  - Monitor WBC    Outcome: Progressing     Problem: DISCHARGE PLANNING  Goal: Discharge to home or other facility with appropriate resources  Description: INTERVENTIONS:  - Identify barriers to discharge w/patient and caregiver  - Arrange for needed discharge resources and transportation as appropriate  - Identify discharge learning needs (meds, wound care, etc.)  - Arrange for interpretive services to assist at discharge as needed  - Refer to Case Management Department for coordinating discharge planning if the patient needs post-hospital services based on physician/advanced practitioner order or complex needs related to functional status, cognitive ability, or social support system  Outcome: Progressing

## 2024-02-12 NOTE — PROGRESS NOTES
"Progress Note - OB/GYN  Mala Jaramillo 28 y.o. female MRN: [id number]  Unit/Bed#: L&D 309-01 Encounter: [id number]    Assessment and Plan     Mala Jaramillo is a patient of: [facility]. She is PPD# 2 s/p  spontaneous vaginal delivery  Recovering well and is stable       Maternal varicella, non-immune  Assessment & Plan  Plan for Varivax PP    Elevated BP without diagnosis of hypertension  Assessment & Plan  Elevated BP at 31 weeks  Monitor BP during admission, if pt has another elevated BP, will meet criteria for gHTN    Systolic (12hrs), Av , Min:102 , Max:110   Diastolic (12hrs), Av, Min:55, Max:63      Asthma affecting pregnancy in third trimester  Assessment & Plan  Home meds ordered    Thyroid disease affecting pregnancy  Assessment & Plan  Home synthroid ordered    *  (spontaneous vaginal delivery)  Assessment & Plan  Lochia WNL   Recovering well   Appropriate bowel and bladder function   Pain well controlled   Tolerating diet   Breastfeeding   Ambulating without issues   No lower extremity tenderness  GBS negative   Rh positive           Disposition    - Anticipate discharge home on PPD# 2      Subjective/Objective     Chief Complaint: Postpartum State     Subjective:    Mala Jaramillo is PPD#2 s/p  spontaneous vaginal delivery. She has no current complaints.  Pain is well controlled.  Patient is currently voiding.  She is ambulating.  Patient is currently passing flatus and has had no bowel movement. She is tolerating PO, and denies nausea or vomitting. Patient denies fever, chills, chest pain, shortness of breath, or calf tenderness. Lochia is normal. She is  Breastfeeding. She is recovering well and is stable.       Vitals:   /55   Pulse 59   Temp (!) 97.3 °F (36.3 °C) (Oral)   Resp 18   Ht 5' 3\" (1.6 m)   Wt 96.4 kg (212 lb 8 oz)   LMP 2023   SpO2 98%   Breastfeeding Yes   BMI 37.64 kg/m²     No intake or output data in the 24 hours ending 24 " 0617    Invasive Devices       None                   Physical Exam:   GEN: Mala Jaramillo appears well, alert and oriented x 3, pleasant and cooperative   CARDIO: RRR, no murmurs or rubs  RESP:  CTAB, no wheezes or rales  ABDOMEN: soft, no tenderness, no distention, fundus @ -1  EXTREMITIES: SCDs on, non tender, no erythema      Labs:     Hemoglobin   Date Value Ref Range Status   02/09/2024 11.4 (L) 11.5 - 15.4 g/dL Final   02/02/2024 11.5 11.5 - 15.4 g/dL Final     WBC   Date Value Ref Range Status   02/09/2024 9.99 4.31 - 10.16 Thousand/uL Final   02/02/2024 7.81 4.31 - 10.16 Thousand/uL Final     Platelets   Date Value Ref Range Status   02/09/2024 164 149 - 390 Thousands/uL Final   02/02/2024 164 149 - 390 Thousands/uL Final     Creatinine   Date Value Ref Range Status   02/09/2024 0.52 (L) 0.60 - 1.30 mg/dL Final     Comment:     Standardized to IDMS reference method   02/02/2024 0.43 (L) 0.60 - 1.30 mg/dL Final     Comment:     Standardized to IDMS reference method   03/04/2023 0.66 0.40 - 1.10 mg/dL Final     AST   Date Value Ref Range Status   02/09/2024 23 13 - 39 U/L Final   02/02/2024 20 13 - 39 U/L Final   03/04/2023 19 <41 U/L Final     ALT   Date Value Ref Range Status   02/09/2024 24 7 - 52 U/L Final     Comment:     Specimen collection should occur prior to Sulfasalazine administration due to the potential for falsely depressed results.    02/02/2024 19 7 - 52 U/L Final     Comment:     Specimen collection should occur prior to Sulfasalazine administration due to the potential for falsely depressed results.    03/04/2023 17 <56 U/L Final          Graciela Nash MD  2/12/2024  6:17 AM

## 2024-02-12 NOTE — UTILIZATION REVIEW
"Mother and baby discharged on 2024     NOTIFICATION OF INPATIENT ADMISSION   MATERNITY/DELIVERY AUTHORIZATION REQUEST   SERVICING FACILITY:   [facility] - L&D, , NICU  [address] [place] [address]  Tax ID: [id number]  NPI: [id number] ATTENDING PROVIDER:  Attending Name and NPI#: Alena Espinosa Md [[id number]]  Address: [address] [place] [address]  Phone: [phone]     ADMISSION INFORMATION:  Place of Service: Inpatient [facility]  Place of Service Code: 21  Inpatient Admission Date/Time: 24 11:12 PM  Discharge Date/Time: 2024  3:11 PM  Admitting Diagnosis Code/Description:  Encounter for induction of labor [Z34.90]   Mother: Mala Jaramillo 1995 Estimated Date of Delivery: 24  Delivering clinician: Alena Espinosa    OB History          1    Para   1    Term   1       0    AB   0    Living   1         SAB   0    IAB   0    Ectopic   0    Multiple   0    Live Births   1               [place] Name & MRN:   Information for the patient's :  Tho Jaramillo [[id number]]   [place] Delivery Information:  Sex: male  Delivered 2/10/2024 9:25 PM by Vaginal, Spontaneous; Gestational Age: 39w0d     Measurements:  Weight: 8 lb 3.9 oz (3740 g);  Height: 20\"    APGAR 1 minute 5 minutes 10 minutes   Totals: 8 9      [place] Birth Information: 28 y.o. female MRN: [id number] Unit/Bed#: L&D 309-01   Birthweight: No birth weight on file. Gestational Age: <None> Delivery Type:    APGARS Totals:        UTILIZATION REVIEW CONTACT:  Jesusita Toro, Utilization   Network Utilization Review Department  Phone: [phone]  Fax [phone]  Email: [email or web address]  Contact for approvals/pending authorizations, clinical reviews, and discharge.   PHYSICIAN ADVISORY SERVICES:  Medical Necessity Denial & Rvev-dc-Atel Review  Phone: [phone]  Fax: [phone]  Email: " [email or web address]   DISCHARGE SUPPORT TEAM:  For Patients Discharge Needs & Updates  Phone: [phone] opt. 2 Fax: [phone]  Email: [email or web address]

## 2024-02-12 NOTE — ASSESSMENT & PLAN NOTE
Lochia WNL   Recovering well   Appropriate bowel and bladder function   Pain well controlled   Tolerating diet   Breastfeeding   Ambulating without issues   No lower extremity tenderness  GBS negative   Rh positive

## 2024-02-12 NOTE — LACTATION NOTE
This note was copied from a baby's chart.  Mala has called out for assistance, she did not remove milk in this pumping session and baby will not latch,  she has requested to supplement with formula (requesting Similac) by paced bottle feeding,  donor milk offered and declined, alternative feeding methods (cup, spoon, finger, syringe) offered and declined.     Reviewed plan to offer breast first, if no latch or effective feeding at the breast, then pump (hands on pumping and hand expression) and offer expressed breast milk followed by formula as needed.     Follow up with [facility] as soon as possible after DC.     Cynthia Markle, RN aware. Dr. Galdamez notified of supplementation by Cynthia Markle, RN.    Encouraged family to call for assistance as needed.

## 2024-02-12 NOTE — PLAN OF CARE
Problem: POSTPARTUM  Goal: Experiences normal postpartum course  Description: INTERVENTIONS:  - Monitor maternal vital signs  - Assess uterine involution and lochia  Outcome: Progressing  Goal: Appropriate maternal -  bonding  Description: INTERVENTIONS:  - Identify family support  - Assess for appropriate maternal/infant bonding   -Encourage maternal/infant bonding opportunities  - Referral to  or  as needed  Outcome: Progressing  Goal: Establishment of infant feeding pattern  Description: INTERVENTIONS:  - Assess breast/bottle feeding  - Refer to lactation as needed  Outcome: Progressing  Goal: Incision(s), wounds(s) or drain site(s) healing without S/S of infection  Description: INTERVENTIONS  - Assess and document dressing, incision, wound bed, drain sites and surrounding tissue  - Provide patient and family education  - Perform skin care/dressing changes michael  Outcome: Progressing     Problem: PAIN - ADULT  Goal: Verbalizes/displays adequate comfort level or baseline comfort level  Description: Interventions:  - Encourage patient to monitor pain and request assistance  - Assess pain using appropriate pain scale  - Administer analgesics based on type and severity of pain and evaluate response  - Implement non-pharmacological measures as appropriate and evaluate response  - Consider cultural and social influences on pain and pain management  - Notify physician/advanced practitioner if interventions unsuccessful or patient reports new pain  Outcome: Progressing     Problem: INFECTION - ADULT  Goal: Absence or prevention of progression during hospitalization  Description: INTERVENTIONS:  - Assess and monitor for signs and symptoms of infection  - Monitor lab/diagnostic results  - Monitor all insertion sites, i.e. indwelling lines, tubes, and drains  - Monitor endotracheal if appropriate and nasal secretions for changes in amount and color  - [place] appropriate cooling/warming  therapies per order  - Administer medications as ordered  - Instruct and encourage patient and family to use good hand hygiene technique  - Identify and instruct in appropriate isolation precautions for identified infection/condition  Outcome: Progressing  Goal: Absence of fever/infection during neutropenic period  Description: INTERVENTIONS:  - Monitor WBC    Outcome: Progressing     Problem: DISCHARGE PLANNING  Goal: Discharge to home or other facility with appropriate resources  Description: INTERVENTIONS:  - Identify barriers to discharge w/patient and caregiver  - Arrange for needed discharge resources and transportation as appropriate  - Identify discharge learning needs (meds, wound care, etc.)  - Arrange for interpretive services to assist at discharge as needed  - Refer to Case Management Department for coordinating discharge planning if the patient needs post-hospital services based on physician/advanced practitioner order or complex needs related to functional status, cognitive ability, or social support system  Outcome: Progressing

## 2024-02-12 NOTE — LACTATION NOTE
This note was copied from a baby's chart.  Worked on positioning infant up at chest level and starting to feed infant with nose arriving at the nipple. Then, using areolar compression to achieve a deep latch that is comfortable and exchanges optimum amounts of milk. I offered suggestions on positioning, for a more optimal latch, showed mom proper positioning, ear, shoulder hip in line, baby's arms open, not in between mom and baby, nose to nipple, hand at base of head/neck.    Assisted with latch and positioning, minimal suck, no swallow, breast compressions offered,  hand expressed several drops of colostrum to his mouth, encouraged mom to pump and feed expressed milk at this time.    Educated on how to differentiate between nutritive and non-nutritive suck.     Offer the breast as desired, following baby's feeding cues, may offer breast compressions as needed and hand express, keeping the attempts short and stop at signs of frustration.  Spend lots of time skin to skin.  Continue to pump and feed expressed milk to meet baby's needs if baby is not latching or not feeding effectively at the breast.     Upon oral exam baby extends tongue to lower alveolar ridge, lateralizes with body, only edges lift to mid mouth, good cupping with complete peristalsis, no snap back, tongue lifts about 1cm with lingual frenulum attaching posterior to tongue tip and to floor of mouth below lower alveolar ridge. Tongue present with a slight cleft in tip.    Left a message at the [facility] per Mala's request for an appt.

## 2024-02-15 ENCOUNTER — OFFICE VISIT (OUTPATIENT)
Dept: POSTPARTUM | Facility: CLINIC | Age: 29
End: 2024-02-15
Payer: COMMERCIAL

## 2024-02-15 VITALS — SYSTOLIC BLOOD PRESSURE: 118 MMHG | DIASTOLIC BLOOD PRESSURE: 62 MMHG

## 2024-02-15 PROCEDURE — 99211 OFF/OP EST MAY X REQ PHY/QHP: CPT | Performed by: PEDIATRICS

## 2024-02-15 NOTE — PATIENT INSTRUCTIONS
-Watch for signs throughout the feeding that baby is effectively removing milk. You will feel strong tugging, hear swallowing and will feel your breasts get softer after nursing. Skin to skin, switch breasts and perform gentle breast compressions to keep Tho actively feeding.   -No need to pump if baby is latching and feeding well at the breast on demand.   -Pump only as needed for missed feedings at the breast or for uncomfortable engorgement, utilize flange fit and settings that are comfortable for you, pumping should not be painful!   -Your nipples measured today at 16 mm on each side. -Spectra Flange Fit Guide : What's My Breast Size?  Flange Guide  [employer]  . Follow Mom[employer] recommendations for flange sizing as well. Based on the guide, Mom[employer] recommends 19 mm inserts for their pump.   -Healing techniques for nipples : lanolin and airflow, or apply lanolin and cover with a piece of wax or parchment paper over top in between feedings.   - Storing and Thawing Breast Milk  WIC Breastfeeding Support (usda.gov)    -Follow up next week for ongoing feeding support.

## 2024-02-15 NOTE — PROGRESS NOTES
INITIAL BREAST FEEDING EVALUATION    Informant/Relationship: Mala (mom/self), Suman (FOB)     Discussion of General Lactation Issues: Tho is sleepy at the breast. In the hospital he was attempting to latch but was getting frustrated. In the past few days he is latching a little bit better but overall things have improved slightly.     Infant is 5 days old today.        History:  Fertility Problem:no  Breast changes:yes - enlargement, leaking since 22 weeks   :  elective induction, Mom was worried about baby's size. Balloon brennan, pitocin, epidural, Mom pushed for 73 minutes, no complications   Full term:yes - 39 weeks   labor:no  First nursing/attempt < 1 hour after birth: didn't latch until 3 hours after he was born, Mom was hand expressing colostrum   Skin to skin following delivery:yes - immediately   Breast changes after delivery:yes - around day 3 postpartum   Rooming in (infant in room with mother with exception of procedures, eg. Circumcision:  went to [facility]  night for fussiness   Blood sugar issues:no  NICU stay:no  Jaundice:no  Phototherapy:no  Supplement given: (list supplement and method used as well as reason(s):yes - in the hospital, he wasn't latching and staff told her to offer him some before leaving     Past Medical History:   Diagnosis Date    Anxiety     Asthma     Disease of thyroid gland     Enteritis 12/10/2018    Migraines     Pancytopenia (HCC) 2018         Current Outpatient Medications:     acetaminophen (TYLENOL) 325 mg tablet, Take 2 tablets (650 mg total) by mouth every 6 (six) hours as needed for mild pain, Disp: 30 tablet, Rfl: 0    albuterol (PROVENTIL HFA,VENTOLIN HFA) 90 mcg/act inhaler, Inhale 2 puffs every 6 (six) hours as needed for wheezing or shortness of breath, Disp: 6.7 g, Rfl: 0    docusate sodium (COLACE) 100 mg capsule, Take 1 capsule (100 mg total) by mouth 2 (two) times a day, Disp: 60 capsule, Rfl: 0    ferrous sulfate 324 (65 Fe) mg,  Take 1 tablet (324 mg total) by mouth every other day, Disp: 60 tablet, Rfl: 3    ibuprofen (MOTRIN) 600 mg tablet, Take 1 tablet (600 mg total) by mouth every 6 (six) hours, Disp: 30 tablet, Rfl: 0    levothyroxine 25 mcg tablet, Take 1 tablet (25 mcg total) by mouth daily, Disp: 90 tablet, Rfl: 0    Magnesium 400 MG CAPS, Take 1 capsule (400 mg total) by mouth in the morning, Disp: 30 capsule, Rfl: 1    metoclopramide (Reglan) 10 mg tablet, Take 1 tablet (10 mg total) by mouth 3 (three) times a day as needed (headache), Disp: 30 tablet, Rfl: 0    Prenatal Vit-Fe Fumarate-FA (PRENATAL VITAMIN PO), Take by mouth, Disp: , Rfl:     No Known Allergies    Social History     Substance and Sexual Activity   Drug Use No       Social History     Interval Breastfeeding History:    Frequency of breast feeding: every 1-1.5 h overnight and every 3-4 h during the day   Does mother feel breastfeeding is effective: Yes - in the past 24 hours   Does infant appear satisfied after nursing:Yes  Stooling pattern normal: lIf no, explain: he hadn't had a stool since Monday, but he did have one lager stool and a few smears in the past 24 hours.   Urinating frequently:Yes  Using shield or shells: No    Alternative/Artificial Feedings:   Bottle: Yes, Dr. Edmondsons, doing paced bottle feeding   Cup: No  Syringe/Finger: No           Formula Type: no                     Amount: n/a            Breast Milk:                      Amount: 1-1.5 oz             Frequency Q  only if he is not latching well to the breast, may end the feeding with the bottle   Elimination Problems: No      Equipment:    Pump            Type: Spectra S2 , [employer] (not yet using) , [employer]             Frequency of Use: to replace feedings, or if breasts are feeling full after feedings       Equipment Problems: no    Mom:  Breast: Normal, rounded, mildly engorged, non tender   Nipple Assessment in General: everted, old piercing sites noted, some small cracks along nipple  attachment to areola bilaterally.   Mother's Awareness of Feeding Cues                 Recognizes: Yes                  Verbalizes: Yes  Support System: good support   History of Breastfeeding: first time breastfeeding   Changes/Stressors/Violence: Baby Tho initially was not latching well, he has been nursing directly from the breast more often.   Concerns/Goals: Mala desires to exclusively breastfeed for at least 1 year, adding in solids when developmentally appropriate.     Problems with Mom: none today, she is well educated and receptive to teaching     Physical Exam  Constitutional:       Appearance: Normal appearance.   HENT:      Head: Normocephalic.   Cardiovascular:      Rate and Rhythm: Normal rate.   Pulmonary:      Effort: Pulmonary effort is normal.   Musculoskeletal:         General: Normal range of motion.      Cervical back: Normal range of motion.      Right lower leg: Edema present.      Left lower leg: Edema present.   Neurological:      General: No focal deficit present.      Mental Status: She is alert and oriented to person, place, and time.   Skin:     General: Skin is warm and dry.      Capillary Refill: Capillary refill takes less than 2 seconds.   Psychiatric:         Mood and Affect: Mood normal.         Behavior: Behavior normal.         Thought Content: Thought content normal.         Judgment: Judgment normal.       Infant:  Behaviors: Sleepy after fussy  Color: Jaundice  Birth weight: 3740 g  Current weight: 3550 g    Problems with infant: jaundice, sleepy at the breast, limited tongue mobility       General Appearance:  Alert, active, no distress                             Head:  Normocephalic, AFOF, sutures opposed                             Eyes:  Conjunctiva clear, no drainage                              Ears:  Normally placed, no anomolies                             Nose:  Septum intact, no drainage or erythema                           Mouth:  No lesions. Tongue is flat  when crying, side edges curled up, palate is high and rounded. Poor lateralization, cups finger gently with some peristalsis noted. Manual lift shows frenulum less than 1 cm, rounded tongue tip, connects well behind alveolar ridge and at the base of the tongue.                     Neck:  Supple, symmetrical, trachea midline,                  Respiratory:  No grunting, flaring, retractions, breath sounds clear and equal            Cardiovascular:  Regular rate and rhythm. No murmur. Adequate perfusion/capillary refill. Femoral pulse present                    Abdomen:   Soft, non-tender, no masses, bowel sounds present, no HSM             Genitourinary:  Normal male, testes descended, no discharge, swelling, or pain, anus patent                          Spine:   No abnormalities noted        Musculoskeletal:  Full range of motion          Skin/Hair/Nails:   Skin warm, dry, and intact, no rashes or abnormal dyspigmentation or lesions                Neurologic:   No abnormal movement, tone appropriate for gestational age    Miguel A Assessment for Lingual Frenulum Function    Appearance Items Function Items   Appearance of tongue when lifted  2: Round or square   Lateralization  1: Body of tongue but not tongue tip   Elasticity of frenulum  1: Moderately elastic   Lift of tongue  1: Only edges to mid-mouth     Length of lingual frenulum when tongue lifted  lingual frenulum length: 1: 1 cm     Extension of tongue  1: Tip over lower gum only   Attachment of lingual frenulum to tongue  2: Posterior to tip   Spread of anterior tongue  2: Complete   Attachment of lingual frenulum to inferior alveolar ridge  2: Attached to floor of mouth or well below ridge Cupping  2: Entire edge, firm cup   Ankyloglossia Grading:  Class I: mild, 12-16 mm  Class II: moderate, 8-11 mm  Class III: severe, 3-7 mm  ClassIV: complete, less than 3 mm Peristalsis  1: Partial, originating posterior to tip       SCORE:    Appearance: 8  (<8=ankyloglossia)  Function: 10 (<11=ankyloglossia) Snapback  2: None        [place] Latch:  Efficiency:               Lips Flanged: Yes              Depth of latch: wide with areolar compression               Audible Swallow: Yes, intermittently               Visible Milk: Yes              Wide Open/ Asymmetrical: Yes              Suck Swallow Cycle: Breathing: yes, Coordinated: yes  Nipple Assessment after latch: Normal: elongated/eraser, no discoloration and no damage noted.  Latch Problems: Tho is very sleep, he is showing crawl, gape and attachment to est a latch, more hands on support provided to help with a deep attachment and more continuous drinking. This is achieved briefly. Discussed signs of active feeding which Mom states he does do often, usually during the overnight feedings.     Position:  Infant's Ergonomics/Body               Body Alignment: Yes               Head Supported: Yes               Close to Mom's body/ Lifted/ Supported: Yes               Mom's Ergonomics/Body: Yes                           Supported: Yes                           Sitting Back: Yes                           Brings Baby to her breast: Yes  Positioning Problems: Reviewed biological nurturing hold and Mom returns this demonstration properly.       Handouts:   None today     Education:  Reviewed Latch: importance of deep latch without pain.   Reviewed Positioning for Dyad: proper alignment and head angle when positioning at the breast   Reviewed Frequency/Supply & Demand: offer the breast at each feeding, pump if baby is not latching and effective transferring milk.   Reviewed Infant:Cues and varied States of Awareness: watch for hunger cues, feed on demand. If baby seems satisfied at the breast (calm, relaxed sleeping, breasts are softer) no need to pump or supplement   Reviewed Infant Elimination: goal of 6+ wets and 2-3 stools per day   Reviewed Alternative/Artificial Feedings: paced bottle feeding technique  discussed  Reviewed Mom/Breast care: gentle handling of the breast at all times, reverse pressure softening, as well as tips for healing sore nipples.    Reviewed Equipment: Hand pump and electric pump general guidance, Discussed proper flange fit, measured for Mom today.         Plan:      Reassurance provided that baby is growing well at this time. Cont with positioning adjustments and watch for signs of effective feeding. Pump if wanting to replace feeding at the breast with bottle feeding or if latching becomes painful. Gentle handling of the breast at all times to preserve integrity.  Contact [facility] for breastfeeding support as needed or ongoing concerns with latching comfort and milk transfer. Follow up in one week for ongoing support.     I have spent 90 minutes with Patient and family today in which greater than 50% of this time was spent in counseling/coordination of care regarding Patient and family education.

## 2024-02-17 NOTE — PROGRESS NOTES
I have reviewed the notes, assessments, and/or procedures performed by Ludmila Live RN, IBCLC, I concur with her/his documentation of Mala Caruso MD 02/16/24

## 2024-02-18 LAB — PLACENTA IN STORAGE: NORMAL

## 2024-02-21 ENCOUNTER — OFFICE VISIT (OUTPATIENT)
Dept: POSTPARTUM | Facility: CLINIC | Age: 29
End: 2024-02-21
Payer: COMMERCIAL

## 2024-02-21 PROCEDURE — 99404 PREV MED CNSL INDIV APPRX 60: CPT | Performed by: PEDIATRICS

## 2024-02-21 NOTE — PROGRESS NOTES
BREAST FEEDING FOLLOW UP VISIT    Informant/Relationship: Mala (mom/self), Gene (FOB)    Discussion of General Lactation Issues: Since our last visit baby Tho has been exclusively and directly breastfeeding.     Infant is 11 days old today.    Interval Breastfeeding History:    Frequency of breast feeding: 10 feedings in 24 hours,   Does mother feel breastfeeding is effective: Yes - some feedings are more active than others   Does infant appear satisfied after nursing:Yes  Stooling pattern normal:Yes  Urinating frequently:Yes  Using shield or shells:No    Alternative/Artificial Feedings:   Bottle: No  Cup: No  Syringe/Finger: No           Formula Type: no                     Amount: n/a            Breast Milk:                      Amount: only direct from the breast             Frequency Q   Elimination Problems: No      Equipment:    Pump            Type: Spectra S2, Momcozy             Frequency of Use: not pumping     Catching letdown with haakaa mavis       Equipment Problems: no      Mom:  Breast: Normal, rounded, mildly engorged, non tender   Nipple Assessment in General: everted, old piercing sites noted, some small cracks along nipple attachment to areola bilaterally.   Mother's Awareness of Feeding Cues                 Recognizes: Yes                  Verbalizes: Yes  Support System: good support   History of Breastfeeding: first time breastfeeding   Changes/Stressors/Violence: Baby Tho initially was not latching well, he has been nursing directly from the breast more often.   Concerns/Goals: Mala desires to exclusively breastfeed for at least 1 year, adding in solids when developmentally appropriate.      Problems with Mom: none today, she is well educated and receptive to teaching     Physical Exam  Constitutional:       Appearance: Normal appearance. She is normal weight.   HENT:      Head: Normocephalic.   Pulmonary:      Effort: Pulmonary effort is normal.   Musculoskeletal:          General: Normal range of motion.      Cervical back: Normal range of motion.   Neurological:      General: No focal deficit present.      Mental Status: She is alert.   Skin:     General: Skin is warm.      Capillary Refill: Capillary refill takes less than 2 seconds.   Psychiatric:         Mood and Affect: Mood normal.         Behavior: Behavior normal.         Thought Content: Thought content normal.         Judgment: Judgment normal.       Infant:  Behaviors: Sleepy  Color: Pink  Birth weight: 3740 g  Current weight: 3655 g     Problems with infant: none today       General Appearance:  Alert, active, no distress                             Head:  Normocephalic, AFOF, sutures opposed                             Eyes:  Conjunctiva clear, no drainage                              Ears:  Normally placed, no anomolies                             Nose:  Septum intact, no drainage or erythema                           Mouth:  No lesions. Tho is very sleepy/disinterested in sucking, lips are pursed and he is not gaping when stimulated. I observed his tongue lateralize and lifted manually to see a frenulum >1 cm that connects about 1 cm behind the tongue tip and along alveolar ridge.                              Neck:  Supple, symmetrical, trachea midline                  Respiratory:  No grunting, flaring, retractions, breath sounds clear and equal            Cardiovascular:  Regular rate and rhythm. No murmur. Adequate perfusion/capillary refill. Femoral pulse present                    Abdomen:   Soft, non-tender, no masses, bowel sounds present, no HSM. Umbilical stump dry and intact              Genitourinary:  Normal male, testes descended, no discharge, swelling, or pain, anus patent                          Spine:   No abnormalities noted        Musculoskeletal:  Full range of motion          Skin/Hair/Nails:   Skin warm, dry, and intact, no rashes or abnormal dyspigmentation or lesions                 Neurologic:   No abnormal movement, tone appropriate for gestational age     Latch:    Baby had eaten just before our visit. He is asleep. Mom states latching is going very well, if latches are painful/shallow she is able to correct it with positioning adjustments.     She does report her nipple appears flat after he releases. Discussed that this could be a sign of a shallow latch, to continue to check in with lactation if any concerns come up about baby's feedings, growth, or Mom's production.       Handouts:   none    Education:  Reviewed Latch: importance of deep latch without pain.   Reviewed Positioning for Dyad: proper alignment and head angle when positioning at the breast   Reviewed Frequency/Supply & Demand: offer the breast at each feeding, pump if baby is not latching and effective transferring milk.   Reviewed Infant:Cues and varied States of Awareness: watch for hunger cues, feed on demand. If baby seems satisfied at the breast (calm, relaxed sleeping, breasts are softer) no need to pump or supplement   Reviewed Infant Elimination: goal of 6+ wets and 2-3 stools per day   Reviewed Alternative/Artificial Feedings: paced bottle feeding technique demonstrated  Reviewed Mom/Breast care: gentle handling of the breast at all times, discussed lymphatic drainage and reverse pressure softening, as well as tips for healing sore nipples.    Reviewed Equipment: Hand pump and electric pump general guidance, Discussed proper flange fit, how to measure        Plan:      Reassurance provided that baby is growing well at this time and dyad is progressing well with breastfeeding. Cont with good positioning as discussed and watch for signs of effective feeding. Pump only if wanting to replace feeding at the breast with bottle feeding, for uncomfortable engorgement not relieved by breastfeeding, or if latching becomes painful. Gentle handling of the breast at all times to preserve integrity.  [facility] for breastfeeding support as needed or ongoing concerns with latching comfort and milk transfer.     I have spent 45 minutes with Patient and family today in which greater than 50% of this time was spent in counseling/coordination of care regarding Patient and family education.

## 2024-02-21 NOTE — PATIENT INSTRUCTIONS
-Great work this past week! I'm so impressed with your and Tho's progress!   -Continue to feed him on demand and watch for signs he is effectively feeding.   -No need to pump unless replacing a breastfeeding session or your breasts are uncomfortable and Tho is not softening them to the point of your comfort. Limit any manual pump or (with suction or pressure on the breast) use as well to ensure your don't create an oversupply.   -Call our center for questions or concerns you have at any time!

## 2024-02-22 NOTE — PROGRESS NOTES
I have reviewed the notes, assessments, and/or procedures performed by Ludmila Live RN, IBCLC, I concur with her/his documentation of Mala Caruso MD 02/21/24

## 2024-03-01 ENCOUNTER — NURSE TRIAGE (OUTPATIENT)
Age: 29
End: 2024-03-01

## 2024-03-01 NOTE — TELEPHONE ENCOUNTER
Regarding: postpartum discharge  ----- Message from Roman Tubbs sent at 3/1/2024 10:58 AM EST -----  Patient called back postpartum delivered 2/10 having unusual discharge with foul smell is uncomfortable and itchy.    ----- Message from Bonnie Ackerman sent at 3/1/2024 10:52 AM EST -----  Patient is currently postpartum and having discharge and she was offered to come in today but appointment no longer available. Please call to discuss.

## 2024-03-01 NOTE — TELEPHONE ENCOUNTER
"Reason for Disposition   MODERATE-SEVERE itching (i.e., interferes with school, work, or sleep)    Answer Assessment - Initial Assessment Questions  1. SYMPTOM: \"What's the main symptom you're concerned about?\" (e.g., pain, itching, dryness)      Vaginal discharge /odor/itching  2. LOCATION: \"Where is the  discharge located?\" (e.g., inside/outside, left/right)      vagina  3. ONSET: \"When did the   itch  start?\"      2 days ago, itching started last night  4. PAIN: \"Is there any pain?\" If Yes, ask: \"How bad is it?\" (Scale: 1-10; mild, moderate, severe)      Denies pelvic pain  5. ITCHING: \"Is there any itching?\" If Yes, ask: \"How bad is it?\" (Scale: 1-10; mild, moderate, severe)      mild  6. CAUSE: \"What do you think is causing the discharge?\" \"Have you had the same problem before? What happened then?\"      Brown/yellow  7. OTHER SYMPTOMS: \"Do you have any other symptoms?\" (e.g., fever, itching, vaginal bleeding, pain with urination, injury to genital area, vaginal foreign body)      Occassionally has pain at suture site when patting dry. Denies fever  8. PREGNANCY: \"Is there any chance you are pregnant?\" \"When was your last menstrual period?\"      PP    Protocols used: Vaginal Symptoms-ADULT-[place]    "

## 2024-03-07 ENCOUNTER — POSTPARTUM VISIT (OUTPATIENT)
Dept: OBGYN CLINIC | Facility: MEDICAL CENTER | Age: 29
End: 2024-03-07
Payer: COMMERCIAL

## 2024-03-07 VITALS
SYSTOLIC BLOOD PRESSURE: 98 MMHG | BODY MASS INDEX: 33.79 KG/M2 | WEIGHT: 190.7 LBS | DIASTOLIC BLOOD PRESSURE: 60 MMHG | HEIGHT: 63 IN

## 2024-03-07 PROBLEM — O99.513 ASTHMA AFFECTING PREGNANCY IN THIRD TRIMESTER: Status: RESOLVED | Noted: 2023-11-16 | Resolved: 2024-03-07

## 2024-03-07 PROBLEM — O16.3 ELEVATED BLOOD PRESSURE AFFECTING PREGNANCY IN THIRD TRIMESTER, ANTEPARTUM: Status: RESOLVED | Noted: 2023-12-21 | Resolved: 2024-03-07

## 2024-03-07 PROBLEM — E07.9 THYROID DISEASE AFFECTING PREGNANCY: Status: RESOLVED | Noted: 2023-11-16 | Resolved: 2024-03-07

## 2024-03-07 PROBLEM — J45.909 ASTHMA AFFECTING PREGNANCY IN THIRD TRIMESTER: Status: RESOLVED | Noted: 2023-11-16 | Resolved: 2024-03-07

## 2024-03-07 PROBLEM — O99.280 THYROID DISEASE AFFECTING PREGNANCY: Status: RESOLVED | Noted: 2023-11-16 | Resolved: 2024-03-07

## 2024-03-07 PROBLEM — O98.513 COVID-19 AFFECTING PREGNANCY IN THIRD TRIMESTER: Status: RESOLVED | Noted: 2024-01-17 | Resolved: 2024-03-07

## 2024-03-07 PROBLEM — U07.1 COVID-19 AFFECTING PREGNANCY IN THIRD TRIMESTER: Status: RESOLVED | Noted: 2024-01-17 | Resolved: 2024-03-07

## 2024-03-07 PROBLEM — R03.0 ELEVATED BP WITHOUT DIAGNOSIS OF HYPERTENSION: Status: RESOLVED | Noted: 2024-01-17 | Resolved: 2024-03-07

## 2024-03-07 PROBLEM — R10.9 ABDOMINAL PAIN DURING PREGNANCY IN THIRD TRIMESTER: Status: RESOLVED | Noted: 2024-02-08 | Resolved: 2024-03-07

## 2024-03-07 PROBLEM — O26.893 ABDOMINAL PAIN DURING PREGNANCY IN THIRD TRIMESTER: Status: RESOLVED | Noted: 2024-02-08 | Resolved: 2024-03-07

## 2024-03-07 NOTE — PROGRESS NOTES
"Postpartum Visit   [facility] [address], [place], [place]    Assessment/Plan:  Mala is a 28 y.o. year old  who presents for postpartum visit.    Routine Postpartum Care  - Normal postpartum exam  - Contraception: none  - Depression Screen: negative  - Feeding: breast  - Cervical cancer screening Up to Date      Additional Problems:  1. Postpartum exam          Subjective:     CC: Postpartum visit    Mala Jaramillo is a 28 y.o. female  who presents for a postpartum visit.     She is approximately 4 weeks postpartum following a  on 2/10/2024 at 39 weeks. Postpartum course has been uncomplicatged. Bleeding staining only. Bowel function is normal. Bladder function is normal. Patient is not sexually active. Contraception method is none/ NFP Postpartum depression screening: negative.    The following portions of the patient's history were reviewed and updated as appropriate: allergies, current medications, past family history, past medical history, obstetric history, gynecologic history, past social history, past surgical history and problem list.      Objective:  BP 98/60   Ht 5' 3\" (1.6 m)   Wt 86.5 kg (190 lb 11.2 oz)   LMP 2023   Breastfeeding Yes   BMI 33.78 kg/m²   Pregravid Weight/BMI: Pregravid weight not on file (BMI Could not be calculated)  Current Weight: 86.5 kg (190 lb 11.2 oz)   Total Weight Gain: Not found.   Pre- Vitals      Flowsheet Row Most Recent Value   Prenatal Assessment    Prenatal Vitals    Blood Pressure 98/60   Weight - Scale 86.5 kg (190 lb 11.2 oz)   Urine Albumin/Glucose    Dilation/Effacement/Station    Vaginal Drainage    Edema              General: Well appearing, no distress.  Mood and affect: Appropriate.  Respiratory: Unlabored breathing. Clear to auscultate.  Cardiovascular: Regular rate and rhythm.  Abdomen: Soft, nontender  Extremities: Warm and well perfused.  Non tender.  "

## 2024-03-18 ENCOUNTER — TELEPHONE (OUTPATIENT)
Age: 29
End: 2024-03-18

## 2024-03-18 NOTE — TELEPHONE ENCOUNTER
Pt called in requesting return to work letter and clearance, of 05/06/24 - date of return to work.

## 2024-04-25 ENCOUNTER — HOSPITAL ENCOUNTER (EMERGENCY)
Facility: HOSPITAL | Age: 29
Discharge: HOME | End: 2024-04-25
Attending: EMERGENCY MEDICINE
Payer: COMMERCIAL

## 2024-04-25 VITALS
WEIGHT: 198.19 LBS | RESPIRATION RATE: 16 BRPM | OXYGEN SATURATION: 96 % | DIASTOLIC BLOOD PRESSURE: 75 MMHG | SYSTOLIC BLOOD PRESSURE: 125 MMHG | HEART RATE: 116 BPM | BODY MASS INDEX: 35.11 KG/M2 | TEMPERATURE: 98.3 F

## 2024-04-25 DIAGNOSIS — M62.838 TRAPEZIUS MUSCLE SPASM: Primary | ICD-10-CM

## 2024-04-25 DIAGNOSIS — R51.9 HEADACHE: ICD-10-CM

## 2024-04-25 PROCEDURE — 99283 EMERGENCY DEPT VISIT LOW MDM: CPT

## 2024-04-25 PROCEDURE — 99284 EMERGENCY DEPT VISIT MOD MDM: CPT | Performed by: EMERGENCY MEDICINE

## 2024-04-25 RX ORDER — LIDOCAINE 50 MG/G
1 PATCH TOPICAL ONCE
Status: DISCONTINUED | OUTPATIENT
Start: 2024-04-25 | End: 2024-04-25

## 2024-04-25 RX ADMIN — DICLOFENAC SODIUM 2 G: 10 GEL TOPICAL at 20:57

## 2024-04-25 NOTE — ED PROVIDER NOTES
History  Chief Complaint   Patient presents with    Headache     Pt complaining of neck pain, headache, ear pain and throat pain since 5. Pt took tylenol without relief. Pt 10 weeks postpartum.      20-year-old female 10 weeks status post spontaneous vaginal delivery presents for evaluation of neck pain and headache. Pt states that she was stretching earlier today when she had gradual onset of sharp pain in the back of her neck radiating to her head with a pressure like sensation that wraps around her head and is in her ears. Moderated in intensity, waxes and wanes in intensiy, worse wwith moving head. No recent falls/head trauma, cough, sore throat, difficulty swallowing, change in voice, focal neuro deficits/weakness, vertigo, cp, sob.        History provided by:  Patient  Headache  Associated symptoms: ear pain, neck pain and neck stiffness    Associated symptoms: no abdominal pain, no congestion, no diarrhea, no eye pain, no fatigue, no fever, no myalgias, no nausea, no numbness, no sore throat, no vomiting and no weakness        Prior to Admission Medications   Prescriptions Last Dose Informant Patient Reported? Taking?   Magnesium 400 MG CAPS   No No   Sig: Take 1 capsule (400 mg total) by mouth in the morning   Prenatal Vit-Fe Fumarate-FA (PRENATAL VITAMIN PO)  Self Yes No   Sig: Take by mouth   albuterol (PROVENTIL HFA,VENTOLIN HFA) 90 mcg/act inhaler  Self No No   Sig: Inhale 2 puffs every 6 (six) hours as needed for wheezing or shortness of breath   levothyroxine 25 mcg tablet  Self No No   Sig: Take 1 tablet (25 mcg total) by mouth daily      Facility-Administered Medications: None       Past Medical History:   Diagnosis Date    Anxiety     Asthma     Disease of thyroid gland     Enteritis 12/10/2018    Hypothyroidism     Migraines     Pancytopenia (HCC) 12/12/2018       Past Surgical History:   Procedure Laterality Date    EXTERNAL EAR SURGERY         Family History   Problem Relation Age of Onset     Other Mother         Hysterectomy    Anemia Mother     Migraines Mother     Miscarriages / Stillbirths Mother     Hypertension Father     No Known Problems Sister     Diabetes Maternal Grandmother     Heart disease Maternal Grandmother     Hypertension Maternal Grandmother     Hyperlipidemia Maternal Grandmother     Alzheimer's disease Maternal Grandmother     Alzheimer's disease Maternal Grandfather     Cataracts Maternal Grandfather     Asthma Maternal Grandfather     Thyroid disease Maternal Grandfather     Stroke Paternal Grandmother     Thyroid disease Paternal Grandmother     Cancer Other         possibly uterine    Breast cancer Neg Hx     Colon cancer Neg Hx     Ovarian cancer Neg Hx      I have reviewed and agree with the history as documented.    E-Cigarette/Vaping    E-Cigarette Use Never User      E-Cigarette/Vaping Substances    Nicotine No     THC No     CBD No     Flavoring No     Other No     Unknown No      Social History     Tobacco Use    Smoking status: Never    Smokeless tobacco: Never   Vaping Use    Vaping status: Never Used   Substance Use Topics    Alcohol use: Not Currently     Comment: social only    Drug use: No       Review of Systems   Constitutional:  Negative for activity change, appetite change, fatigue and fever.   HENT:  Positive for ear pain. Negative for congestion, dental problem, rhinorrhea and sore throat.    Eyes:  Negative for pain and redness.   Respiratory:  Negative for chest tightness, shortness of breath and wheezing.    Cardiovascular:  Negative for chest pain and palpitations.   Gastrointestinal:  Negative for abdominal pain, blood in stool, constipation, diarrhea, nausea and vomiting.   Endocrine: Negative for cold intolerance and heat intolerance.   Genitourinary:  Negative for dysuria, frequency and hematuria.   Musculoskeletal:  Positive for neck pain and neck stiffness. Negative for arthralgias and myalgias.   Skin:  Negative for color change, pallor and rash.    Neurological:  Positive for headaches. Negative for weakness and numbness.   Hematological:  Does not bruise/bleed easily.   Psychiatric/Behavioral:  Negative for agitation, hallucinations and suicidal ideas.        Physical Exam  Physical Exam  Vitals and nursing note reviewed.   Constitutional:       Appearance: She is well-developed.   HENT:      Right Ear: Tympanic membrane, ear canal and external ear normal.      Left Ear: Tympanic membrane, ear canal and external ear normal.      Nose: Nose normal.      Mouth/Throat:      Pharynx: No oropharyngeal exudate or posterior oropharyngeal erythema.   Eyes:      Extraocular Movements: Extraocular movements intact.      Conjunctiva/sclera: Conjunctivae normal.      Pupils: Pupils are equal, round, and reactive to light.   Neck:      Comments: No meningeal signs. Nttp over ct spines.  +ttp with hypertonicity of bilateral trapezius.  Painful rom.  Cardiovascular:      Rate and Rhythm: Normal rate and regular rhythm.      Heart sounds: Normal heart sounds.   Pulmonary:      Effort: Pulmonary effort is normal. No respiratory distress.      Breath sounds: Normal breath sounds. No wheezing or rales.   Chest:      Chest wall: No tenderness.   Abdominal:      General: Bowel sounds are normal. There is no distension.      Palpations: Abdomen is soft. There is no mass.      Tenderness: There is no abdominal tenderness.      Hernia: No hernia is present.      Comments: No cvat   Musculoskeletal:         General: Normal range of motion.      Cervical back: Normal range of motion and neck supple.   Lymphadenopathy:      Cervical: No cervical adenopathy.   Skin:     Findings: No erythema or rash.      Comments: No edema   Neurological:      General: No focal deficit present.      Mental Status: She is alert and oriented to person, place, and time.      Cranial Nerves: No cranial nerve deficit.      Sensory: No sensory deficit.      Motor: No weakness.      Coordination:  Coordination normal.      Gait: Gait normal.   Psychiatric:         Mood and Affect: Mood normal.         Behavior: Behavior normal.         Vital Signs  ED Triage Vitals   Temperature Pulse Respirations Blood Pressure SpO2   04/25/24 1930 04/25/24 1927 04/25/24 1927 04/25/24 1927 04/25/24 1927   98.3 °F (36.8 °C) (!) 116 16 125/75 96 %      Temp Source Heart Rate Source Patient Position - Orthostatic VS BP Location FiO2 (%)   04/25/24 1930 04/25/24 1927 04/25/24 1927 04/25/24 1927 --   Oral Monitor Sitting Right arm       Pain Score       --                  Vitals:    04/25/24 1927   BP: 125/75   Pulse: (!) 116   Patient Position - Orthostatic VS: Sitting         Visual Acuity      ED Medications  Medications   lidocaine (LIDODERM) 5 % patch 1 patch (has no administration in time range)       Diagnostic Studies  Results Reviewed       None                   No orders to display              Procedures  Procedures         ED Course                               SBIRT 20yo+      Flowsheet Row Most Recent Value   Initial Alcohol Screen: US AUDIT-C     1. How often do you have a drink containing alcohol? 0 Filed at: 04/25/2024 1929   2. How many drinks containing alcohol do you have on a typical day you are drinking?  0 Filed at: 04/25/2024 1929   3a. Male UNDER 65: How often do you have five or more drinks on one occasion? 0 Filed at: 04/25/2024 1929   3b. FEMALE Any Age, or MALE 65+: How often do you have 4 or more drinks on one occassion? 0 Filed at: 04/25/2024 1929   Audit-C Score 0 Filed at: 04/25/2024 1929   TORREY: How many times in the past year have you...    Used an illegal drug or used a prescription medication for non-medical reasons? Never Filed at: 04/25/2024 1929                      Medical Decision Making  Neck pain and ha-no hx/exam findings to suggest meningitis, cns bleed and medical work up is not indicated. Exam is c/w trapezius muscle spasm and tension type headache-will reassure, , tx  symptoms, pcp f/u    Risk  Prescription drug management.             Disposition  Final diagnoses:   Trapezius muscle spasm   Headache     Time reflects when diagnosis was documented in both MDM as applicable and the Disposition within this note       Time User Action Codes Description Comment    4/25/2024  7:54 PM Man Ann [M62.838] Trapezius muscle spasm     4/25/2024  7:54 PM Man Ann [R51.9] Headache           ED Disposition       ED Disposition   Discharge    Condition   Stable    Date/Time   Thu Apr 25, 2024 1954    Comment   Mala Jaramillo discharge to home/self care.                   Follow-up Information       Follow up With Specialties Details Why Contact Info    LIZ Lerma Nurse Practitioner Schedule an appointment as soon as possible for a visit in 2 days  [address] [facility], [address]  [facility] [id number] [phone]              Patient's Medications   Discharge Prescriptions    DICLOFENAC SODIUM (VOLTAREN) 1 %    Apply 2 g topically 4 (four) times a day       Start Date: 4/25/2024 End Date: --       Order Dose: 2 g       Quantity: 150 g    Refills: 0       No discharge procedures on file.    PDMP Review       None            ED Provider  Electronically Signed by             Man Ann MD  04/25/24 2033

## 2024-06-04 ENCOUNTER — OFFICE VISIT (OUTPATIENT)
Dept: URGENT CARE | Age: 29
End: 2024-06-04
Payer: COMMERCIAL

## 2024-06-04 VITALS
DIASTOLIC BLOOD PRESSURE: 63 MMHG | HEART RATE: 85 BPM | BODY MASS INDEX: 34.68 KG/M2 | TEMPERATURE: 97.4 F | HEIGHT: 63 IN | OXYGEN SATURATION: 99 % | RESPIRATION RATE: 18 BRPM | SYSTOLIC BLOOD PRESSURE: 116 MMHG | WEIGHT: 195.7 LBS

## 2024-06-04 DIAGNOSIS — J30.9 ALLERGIC RHINITIS, UNSPECIFIED SEASONALITY, UNSPECIFIED TRIGGER: Primary | ICD-10-CM

## 2024-06-04 PROCEDURE — 99213 OFFICE O/P EST LOW 20 MIN: CPT | Performed by: EMERGENCY MEDICINE

## 2024-06-04 NOTE — PROGRESS NOTES
[facility]        NAME: Mala Jaramillo is a 28 y.o. female  : 1995    MRN: [id number]  DATE: 2024  TIME: 2:53 PM    Assessment and Plan   Allergic rhinitis, unspecified seasonality, unspecified trigger [J30.9]  1. Allergic rhinitis, unspecified seasonality, unspecified trigger          Congestion, PND, cough and sneezing. No fevers. Sore throat at times. Wanted to review breastfeeding safe medications. List provided and discussed.     Patient Instructions       Follow up with PCP in 3-5 days.  Proceed to  ER if symptoms worsen.    If tests have been performed at [facility], our office will contact you with results if changes need to be made to the care plan discussed with you at the visit.  [facility]    Chief Complaint     Chief Complaint   Patient presents with    Cold Like Symptoms     Patient reports that she has cold like symptoms and wanted to know to take, she is breastfeeding.          History of Present Illness       Congestion, PND, cough and sneezing. No fevers. Sore throat at times. Wanted to review breastfeeding safe medications. List provided and discussed.     Sore Throat   This is a recurrent problem. The current episode started in the past 7 days. The problem has been gradually worsening. Neither side of throat is experiencing more pain than the other. There has been no fever. The pain is at a severity of 6/10. The pain is moderate. Associated symptoms include congestion, coughing, headaches, a hoarse voice and a plugged ear sensation. Pertinent negatives include no abdominal pain, diarrhea, drooling, ear discharge, ear pain, neck pain, shortness of breath, stridor, swollen glands or vomiting.       Review of Systems   Review of Systems   Constitutional:  Negative for fever.   HENT:  Positive for congestion, hoarse voice and sore throat. Negative for drooling, ear discharge and ear pain.    Respiratory:  Positive for cough.  Negative for shortness of breath, wheezing and stridor.    Gastrointestinal:  Negative for abdominal pain, diarrhea and vomiting.   Musculoskeletal:  Negative for neck pain.   Allergic/Immunologic: Positive for environmental allergies.   Neurological:  Positive for headaches.         Current Medications       Current Outpatient Medications:     albuterol (PROVENTIL HFA,VENTOLIN HFA) 90 mcg/act inhaler, Inhale 2 puffs every 6 (six) hours as needed for wheezing or shortness of breath, Disp: 6.7 g, Rfl: 0    Diclofenac Sodium (VOLTAREN) 1 %, Apply 2 g topically 4 (four) times a day, Disp: 150 g, Rfl: 0    levothyroxine 25 mcg tablet, Take 1 tablet (25 mcg total) by mouth daily, Disp: 90 tablet, Rfl: 0    Magnesium 400 MG CAPS, Take 1 capsule (400 mg total) by mouth in the morning, Disp: 30 capsule, Rfl: 1    Prenatal Vit-Fe Fumarate-FA (PRENATAL VITAMIN PO), Take by mouth, Disp: , Rfl:     Current Allergies     Allergies as of 06/04/2024    (No Known Allergies)            The following portions of the patient's history were reviewed and updated as appropriate: allergies, current medications, past family history, past medical history, past social history, past surgical history and problem list.     Past Medical History:   Diagnosis Date    Anxiety     Asthma     Disease of thyroid gland     Enteritis 12/10/2018    Hypothyroidism     Migraines     Pancytopenia (HCC) 12/12/2018       Past Surgical History:   Procedure Laterality Date    EXTERNAL EAR SURGERY         Family History   Problem Relation Age of Onset    Other Mother         Hysterectomy    Anemia Mother     Migraines Mother     Miscarriages / Stillbirths Mother     Hypertension Father     No Known Problems Sister     Diabetes Maternal Grandmother     Heart disease Maternal Grandmother     Hypertension Maternal Grandmother     Hyperlipidemia Maternal Grandmother     Alzheimer's disease Maternal Grandmother     Alzheimer's disease Maternal Grandfather      "Cataracts Maternal Grandfather     Asthma Maternal Grandfather     Thyroid disease Maternal Grandfather     Stroke Paternal Grandmother     Thyroid disease Paternal Grandmother     Cancer Other         possibly uterine    Breast cancer Neg Hx     Colon cancer Neg Hx     Ovarian cancer Neg Hx          Medications have been verified.        Objective   /63   Pulse 85   Temp (!) 97.4 °F (36.3 °C) (Tympanic)   Resp 18   Ht 5' 3\" (1.6 m)   Wt 88.8 kg (195 lb 11.2 oz)   SpO2 99%   BMI 34.67 kg/m²   No LMP recorded.       Physical Exam     Physical Exam  Vitals reviewed.   Constitutional:       Appearance: Normal appearance.   HENT:      Right Ear: Tympanic membrane normal.      Left Ear: Tympanic membrane normal.      Nose: Congestion and rhinorrhea present.      Mouth/Throat:      Pharynx: No posterior oropharyngeal erythema.   Cardiovascular:      Rate and Rhythm: Normal rate and regular rhythm.      Pulses: Normal pulses.      Heart sounds: Normal heart sounds.   Pulmonary:      Effort: Pulmonary effort is normal.      Breath sounds: Normal breath sounds.   Lymphadenopathy:      Cervical: No cervical adenopathy.   Neurological:      Mental Status: She is alert.                   "

## 2024-06-05 ENCOUNTER — OFFICE VISIT (OUTPATIENT)
Dept: FAMILY MEDICINE CLINIC | Facility: CLINIC | Age: 29
End: 2024-06-05
Payer: COMMERCIAL

## 2024-06-05 VITALS
BODY MASS INDEX: 35.05 KG/M2 | HEART RATE: 73 BPM | HEIGHT: 63 IN | WEIGHT: 197.8 LBS | TEMPERATURE: 97.5 F | OXYGEN SATURATION: 97 % | SYSTOLIC BLOOD PRESSURE: 122 MMHG | DIASTOLIC BLOOD PRESSURE: 78 MMHG

## 2024-06-05 DIAGNOSIS — J02.9 SORE THROAT: ICD-10-CM

## 2024-06-05 DIAGNOSIS — Z82.71 FAMILY HISTORY OF POLYCYSTIC KIDNEY DISEASE: ICD-10-CM

## 2024-06-05 DIAGNOSIS — J30.9 ALLERGIC RHINITIS, UNSPECIFIED SEASONALITY, UNSPECIFIED TRIGGER: ICD-10-CM

## 2024-06-05 DIAGNOSIS — J06.9 ACUTE URI: Primary | ICD-10-CM

## 2024-06-05 LAB — S PYO AG THROAT QL: NEGATIVE

## 2024-06-05 PROCEDURE — 87880 STREP A ASSAY W/OPTIC: CPT | Performed by: NURSE PRACTITIONER

## 2024-06-05 PROCEDURE — 99214 OFFICE O/P EST MOD 30 MIN: CPT | Performed by: NURSE PRACTITIONER

## 2024-06-05 RX ORDER — AMOXICILLIN 500 MG/1
500 CAPSULE ORAL EVERY 12 HOURS SCHEDULED
Qty: 14 CAPSULE | Refills: 0 | Status: SHIPPED | OUTPATIENT
Start: 2024-06-05 | End: 2024-06-12

## 2024-06-05 RX ORDER — FLUTICASONE PROPIONATE 50 MCG
1-2 SPRAY, SUSPENSION (ML) NASAL DAILY
Qty: 16 G | Refills: 0 | Status: SHIPPED | OUTPATIENT
Start: 2024-06-05

## 2024-06-05 NOTE — PROGRESS NOTES
Ambulatory Visit  Name: Mala Jaramillo      : 1995      MRN: [id number]  Encounter Provider: LIZ Lerma  Encounter Date: 2024   Encounter department: [facility]    Assessment & Plan   1. Acute URI  -     fluticasone (FLONASE) 50 mcg/act nasal spray; 1-2 sprays into each nostril daily  -     amoxicillin (AMOXIL) 500 mg capsule; Take 1 capsule (500 mg total) by mouth every 12 (twelve) hours for 7 days  2. Sore throat  -     POCT rapid ANTIGEN strepA  3. Allergic rhinitis, unspecified seasonality, unspecified trigger  -     Ambulatory Referral to Allergy; Future  4. Family history of polycystic kidney disease  -     US kidney and bladder; Future; Expected date: 2024     Start Flonase, this is an intranasal corticosteroid. This is 1-2 sprays each nostril once daily. Please be aware that this can cause nasal mucosa irritation. Stop using if you experience any nose bleeds. This can be used for allergy symptoms as well as ear discomfort/pressure.   Stay well hydrated.   Can take tylenol or advil. Honey may help with throat.   If no improvement over next 2-3 days can start Amoxicillin.  Reviewed medication cautions with breast-feeding.  Advised on side effects/symptoms to look for.  Would recommend avoiding antibiotic unless absolutely necessary.  Can follow up with Allergist for allergy testing.   US of kidneys ordered- check with insurance prior to completing. This is due to family history polycystic kidney disease.       History of Present Illness   {Disappearing Hyperlinks I Encounters * My Last Note * Since Last Visit * History :72889}  Patient presents with Nasal Congestion and Cough: Started last Thursday, cough , congestion, headache, sinus issues,[facility] anything. Still experiencing Congestion, headache, feels warmth in chest, has a lot of phlegm. Has a very dry cough. Not taking anything currently. No fevers or chills. No sick  "contacts. Symptoms keep worsening. She is breastfeeding. No recent antibiotics.   Patient states her father was also recently diagnosed with polycystic kidney disease so she would like to discuss this.  Would also like to discuss possible allergy testing.    Cough  This is a recurrent problem. The current episode started in the past 7 days. The problem has been gradually worsening. The problem occurs constantly. The cough is Non-productive. Associated symptoms include ear congestion, headaches, nasal congestion, postnasal drip, rhinorrhea and a sore throat. Pertinent negatives include no chest pain, chills, ear pain, fever, heartburn, hemoptysis, myalgias, rash, shortness of breath, sweats, weight loss or wheezing. Nothing aggravates the symptoms.       Review of Systems   Constitutional:  Negative for chills, fever and weight loss.   HENT:  Positive for postnasal drip, rhinorrhea and sore throat. Negative for ear pain.    Respiratory:  Positive for cough. Negative for hemoptysis, shortness of breath and wheezing.    Cardiovascular:  Negative for chest pain.   Gastrointestinal:  Negative for heartburn.   Musculoskeletal:  Negative for myalgias.   Skin:  Negative for rash.   Neurological:  Positive for headaches.       Objective   {Disappearing Hyperlinks   Review Vitals * Enter New Vitals * Results Review * Labs * Imaging * Cardiology * Procedures * Lung Cancer Screening :[id number]}  /78   Pulse 73   Temp 97.5 °F (36.4 °C) (Temporal)   Ht 5' 3\" (1.6 m)   Wt 89.7 kg (197 lb 12.8 oz)   SpO2 97%   BMI 35.04 kg/m²     Physical Exam  Vitals and nursing note reviewed.   Constitutional:       General: She is not in acute distress.     Appearance: Normal appearance. She is well-developed. She is not diaphoretic.   HENT:      Head: Normocephalic and atraumatic.      Right Ear: External ear normal.      Left Ear: External ear normal.      Mouth/Throat:      Pharynx: Posterior oropharyngeal erythema present. No " oropharyngeal exudate.   Eyes:      General: Lids are normal.         Right eye: No discharge.         Left eye: No discharge.      Conjunctiva/sclera: Conjunctivae normal.   Cardiovascular:      Rate and Rhythm: Normal rate and regular rhythm.      Heart sounds: No murmur heard.  Pulmonary:      Effort: Pulmonary effort is normal. No respiratory distress.      Breath sounds: Normal breath sounds. No wheezing.   Musculoskeletal:         General: No deformity.   Skin:     General: Skin is warm and dry.   Neurological:      General: No focal deficit present.      Mental Status: She is alert and oriented to person, place, and time.   Psychiatric:         Speech: Speech normal.         Behavior: Behavior normal.         Thought Content: Thought content normal.         Judgment: Judgment normal.       Administrative Statements {Disappearing Hyperlinks I  Level of Service [id number]}

## 2024-06-05 NOTE — PATIENT INSTRUCTIONS
Start Flonase, this is an intranasal corticosteroid. This is 1-2 sprays each nostril once daily. Please be aware that this can cause nasal mucosa irritation. Stop using if you experience any nose bleeds. This can be used for allergy symptoms as well as ear discomfort/pressure.     Stay well hydrated.     Can take tylenol or advil. Honey may help with throat.     If no improvement over next 2-3 days can start Amoxicillin.     Can follow up with Allergist for allergy testing.     US of kidneys ordered- check with insurance prior to completing. This is due to family history polycystic kidney disease.     Please call the office if you are experiencing any worsening of symptoms or no symptom improvement.

## 2024-06-25 ENCOUNTER — VBI (OUTPATIENT)
Dept: ADMINISTRATIVE | Facility: OTHER | Age: 29
End: 2024-06-25

## 2024-06-25 NOTE — TELEPHONE ENCOUNTER
06/25/24 9:51 AM     Chart reviewed for Pap Smear (HPV) aka Cervical Cancer Screening was/were submitted to the patient's insurance.     Dimitri Buenrostro MA   [facility]

## 2024-09-04 ENCOUNTER — APPOINTMENT (OUTPATIENT)
Dept: LAB | Facility: HOSPITAL | Age: 29
End: 2024-09-04
Payer: COMMERCIAL

## 2024-09-04 DIAGNOSIS — R53.83 OTHER FATIGUE: ICD-10-CM

## 2024-09-04 LAB
ERYTHROCYTE [DISTWIDTH] IN BLOOD BY AUTOMATED COUNT: 12.1 % (ref 11.6–15.1)
HCT VFR BLD AUTO: 39.5 % (ref 34.8–46.1)
HGB BLD-MCNC: 12.8 G/DL (ref 11.5–15.4)
MCH RBC QN AUTO: 28 PG (ref 26.8–34.3)
MCHC RBC AUTO-ENTMCNC: 32.4 G/DL (ref 31.4–37.4)
MCV RBC AUTO: 86 FL (ref 82–98)
PLATELET # BLD AUTO: 231 THOUSANDS/UL (ref 149–390)
PMV BLD AUTO: 11.2 FL (ref 8.9–12.7)
RBC # BLD AUTO: 4.57 MILLION/UL (ref 3.81–5.12)
TSH SERPL DL<=0.05 MIU/L-ACNC: 1.2 UIU/ML (ref 0.45–4.5)
WBC # BLD AUTO: 7.08 THOUSAND/UL (ref 4.31–10.16)

## 2024-09-04 PROCEDURE — 85027 COMPLETE CBC AUTOMATED: CPT

## 2024-09-12 ENCOUNTER — OFFICE VISIT (OUTPATIENT)
Dept: FAMILY MEDICINE CLINIC | Facility: CLINIC | Age: 29
End: 2024-09-12
Payer: COMMERCIAL

## 2024-09-12 VITALS
DIASTOLIC BLOOD PRESSURE: 78 MMHG | HEART RATE: 77 BPM | TEMPERATURE: 98 F | OXYGEN SATURATION: 98 % | WEIGHT: 192 LBS | SYSTOLIC BLOOD PRESSURE: 118 MMHG | BODY MASS INDEX: 34.02 KG/M2 | HEIGHT: 63 IN

## 2024-09-12 DIAGNOSIS — E03.9 ACQUIRED HYPOTHYROIDISM: ICD-10-CM

## 2024-09-12 PROCEDURE — 99214 OFFICE O/P EST MOD 30 MIN: CPT | Performed by: NURSE PRACTITIONER

## 2024-09-12 RX ORDER — LEVOTHYROXINE SODIUM 25 UG/1
25 TABLET ORAL DAILY
Qty: 90 TABLET | Refills: 1 | Status: SHIPPED | OUTPATIENT
Start: 2024-09-12

## 2024-09-12 NOTE — PROGRESS NOTES
"Ambulatory Visit  Name: Mala Jaramillo      : 1995      MRN: [id number]  Encounter Provider: LIZ Lerma  Encounter Date: 2024   Encounter department: [facility]    Assessment & Plan  Acquired hypothyroidism  Continue with current dose of levothyroxine 25 mcg daily.  Labs due in 6 months.  Has follow-up for well visit in the next 4 to 6 months.  Thyroid ultrasound up-to-date.  Asymptomatic.  Follow-up sooner if needed.  Orders:    levothyroxine 25 mcg tablet; Take 1 tablet (25 mcg total) by mouth daily    TSH, 3rd generation; Future    T4, free; Future       History of Present Illness     Patient presents the office today for thyroid follow-up.  Patient currently being managed on 25 mcg levothyroxine.  Recent pregnancy where this was being managed.  No dose adjustments were needed.  Labs up-to-date and stable.  Overall doing well/feeling well.  Asymptomatic.          Review of Systems   Constitutional:  Negative for chills and fever.   Eyes:  Negative for discharge.   Respiratory:  Negative for shortness of breath.    Cardiovascular:  Negative for chest pain.   Gastrointestinal:  Negative for constipation and diarrhea.   Genitourinary:  Negative for difficulty urinating.   Musculoskeletal:  Negative for joint swelling.   Skin:  Negative for rash.   Neurological:  Negative for headaches.   Hematological:  Negative for adenopathy.   Psychiatric/Behavioral:  The patient is not nervous/anxious.            Objective     /78   Pulse 77   Temp 98 °F (36.7 °C)   Ht 5' 3\" (1.6 m)   Wt 87.1 kg (192 lb)   SpO2 98%   BMI 34.01 kg/m²     Physical Exam  Vitals and nursing note reviewed.   Constitutional:       General: She is not in acute distress.     Appearance: Normal appearance. She is well-developed. She is obese. She is not diaphoretic.   HENT:      Head: Normocephalic and atraumatic.      Right Ear: External ear normal.      Left Ear: External ear normal. "   Eyes:      General: Lids are normal.         Right eye: No discharge.         Left eye: No discharge.      Conjunctiva/sclera: Conjunctivae normal.   Neck:      Thyroid: No thyroid mass, thyromegaly or thyroid tenderness.   Cardiovascular:      Rate and Rhythm: Normal rate and regular rhythm.      Heart sounds: No murmur heard.  Pulmonary:      Effort: Pulmonary effort is normal. No respiratory distress.      Breath sounds: Normal breath sounds. No wheezing.   Musculoskeletal:         General: No deformity.   Skin:     General: Skin is warm and dry.   Neurological:      General: No focal deficit present.      Mental Status: She is alert and oriented to person, place, and time.   Psychiatric:         Speech: Speech normal.         Behavior: Behavior normal.         Thought Content: Thought content normal.         Judgment: Judgment normal.

## 2024-09-12 NOTE — PATIENT INSTRUCTIONS
Labs due around 6 months.     Return for well exam at that time.     Please call the office if you are experiencing any worsening of symptoms or no symptom improvement.

## 2024-11-05 ENCOUNTER — ANNUAL EXAM (OUTPATIENT)
Dept: OBGYN CLINIC | Facility: MEDICAL CENTER | Age: 29
End: 2024-11-05
Payer: COMMERCIAL

## 2024-11-05 VITALS
HEIGHT: 63 IN | BODY MASS INDEX: 33.38 KG/M2 | SYSTOLIC BLOOD PRESSURE: 100 MMHG | WEIGHT: 188.4 LBS | DIASTOLIC BLOOD PRESSURE: 60 MMHG

## 2024-11-05 DIAGNOSIS — Z01.419 ENCOUNTER FOR WELL WOMAN EXAM WITH ROUTINE GYNECOLOGICAL EXAM: Primary | ICD-10-CM

## 2024-11-05 PROCEDURE — 99395 PREV VISIT EST AGE 18-39: CPT | Performed by: OBSTETRICS & GYNECOLOGY

## 2024-11-05 NOTE — PROGRESS NOTES
"[facility] [address], [place], [place]    ASSESSMENT/PLAN: Mala Jaramillo is a 29 y.o.  who presents for annual gynecologic exam.    Encounter for routine gynecologic examination  - Routine well woman exam completed today.  - Cervical Cancer Screening: Current ASCCP Guidelines reviewed. Last Pap: 2023 . Next Pap Due:   - Contraceptive counseling discussed.  Current contraception: none, would like to conceive     Additional problems addressed during this visit:  1. Encounter for well woman exam with routine gynecological exam      CC:  Annual Gynecologic Examination    HPI: Mala Jaramillo is a 29 y.o.  who presents for annual gynecologic examination.  Gynecologic Exam      Patient presents for routine annual exam, doing well; looking to conceive; she is still breastfeeding her son. Has not had a cycle since delivery; has noted some spotting lately     The following portions of the patient's history were reviewed and updated as appropriate: allergies, current medications, past family history, past medical history, obstetric history, gynecologic history, past social history, past surgical history and problem list.    Review of Systems   Constitutional: Negative.    HENT: Negative.     Eyes: Negative.    Respiratory: Negative.     Cardiovascular: Negative.    Gastrointestinal: Negative.    Genitourinary: Negative.    Musculoskeletal: Negative.    All other systems reviewed and are negative.        Objective:  /60   Ht 5' 3\" (1.6 m)   Wt 85.5 kg (188 lb 6.4 oz)   BMI 33.37 kg/m²    Physical Exam  Vitals reviewed.   Constitutional:       General: She is not in acute distress.     Appearance: She is well-developed.   HENT:      Head: Normocephalic and atraumatic.      Nose: Nose normal.   Cardiovascular:      Rate and Rhythm: Normal rate.   Pulmonary:      Effort: Pulmonary effort is normal. No respiratory distress.   Chest:   Breasts:     Breasts are " symmetrical.      Right: Normal. No mass, nipple discharge, skin change or tenderness.      Left: Normal. No mass, nipple discharge, skin change or tenderness.   Abdominal:      General: There is no distension.      Palpations: Abdomen is soft. There is no mass.      Tenderness: There is no abdominal tenderness. There is no guarding or rebound.   Genitourinary:     General: Normal vulva.      Exam position: Lithotomy position.      Labia:         Right: No lesion.         Left: No lesion.       Urethra: No prolapse (urethral meatus normal).      Vagina: Normal. No vaginal discharge, erythema or bleeding.      Cervix: Normal.      Uterus: Normal.       Adnexa: Right adnexa normal and left adnexa normal.   Musculoskeletal:         General: Normal range of motion.      Cervical back: Normal range of motion.   Lymphadenopathy:      Upper Body:      Right upper body: No supraclavicular, axillary or pectoral adenopathy.      Left upper body: No supraclavicular, axillary or pectoral adenopathy.      Lower Body: No right inguinal adenopathy. No left inguinal adenopathy.   Skin:     General: Skin is warm and dry.   Neurological:      Mental Status: She is alert and oriented to person, place, and time.   Psychiatric:         Behavior: Behavior normal.         Thought Content: Thought content normal.         Judgment: Judgment normal.

## 2024-11-07 ENCOUNTER — NURSE TRIAGE (OUTPATIENT)
Age: 29
End: 2024-11-07

## 2024-11-07 NOTE — TELEPHONE ENCOUNTER
"Spoke with patient who reports she started with vaginal spotting on Monday.  She has not had a period since delivery and is breastfeeding.  She reports -HPT this morning.  She also reports intermittent pain LLQ.  She reports hx of ovarian cysts.  She also reports intermittent headaches.      Will discuss with provider and call pt back.  Pt agreeable to plan.       Reason for Disposition   Normal menstrual flow    Answer Assessment - Initial Assessment Questions  1. BLEEDING SEVERITY: \"Describe the bleeding that you are having.\" \"How much bleeding is there?\"       Spotting  2. ONSET: \"When did the bleeding begin?\" \"Is it continuing now?\"      Monday  3. MENSTRUAL PERIOD: \"When was the last normal menstrual period?\" \"How is this different than your period?\"      Prior to being pregnant   4. REGULARITY: \"How regular are your periods?\"        5. ABDOMEN PAIN: \"Do you have any pain?\" \"How bad is the pain?\"  (e.g., Scale 0-10; none, mild, moderate, or severe)      0/10 Currently, comes and goes but only on left side.  6. PREGNANCY: \"Is there any chance you are pregnant?\" \"When was your last menstrual period?\"      -HPT today  7. BREASTFEEDING: \"Are you breastfeeding?\"      yes  8. HORMONE MEDICINES: \"Are you taking any hormone medicines, prescription or over-the-counter?\" (e.g., birth control pills, estrogen)      denies  9. BLOOD THINNER MEDICINES: \"Do you take any blood thinners?\" (e.g., Coumadin / warfarin, Pradaxa / dabigatran, aspirin)      denies  10. CAUSE: \"What do you think is causing the bleeding?\" (e.g., recent gyn surgery, recent gyn procedure; known bleeding disorder, cervical cancer, polycystic ovarian disease, fibroids)          unsure  11. HEMODYNAMIC STATUS: \"Are you weak or feeling lightheaded?\" If Yes, ask: \"Can you stand and walk normally?\"         denies  12. OTHER SYMPTOMS: \"What other symptoms are you having with the bleeding?\" (e.g., passed tissue, vaginal discharge, fever, menstrual-type cramps)   "      Intermittent headaches    Protocols used: Vaginal Bleeding - Abnormal-Adult-[place]

## 2024-12-17 ENCOUNTER — NURSE TRIAGE (OUTPATIENT)
Age: 29
End: 2024-12-17

## 2024-12-17 NOTE — TELEPHONE ENCOUNTER
"Spoke with patient who reports LMP 11/4 which was light, just spotting, then again started 12/14 with spotting again.  She reports she started with LLQ pain Sat that was intermittent.  But today has been consistent since this morning, 5/10.  She reports -HPT.  She was advised to go the ED for further evaluation.  Pt verbalized understanding, no further questions or concerns at this time.         Answer Assessment - Initial Assessment Questions  1. LOCATION: \"Where does it hurt?\"       LLQ  2. RADIATION: \"Does the pain shoot anywhere else?\" (e.g., chest, back)      denies  3. ONSET: \"When did the pain begin?\" (e.g., minutes, hours or days ago)       yesterday  4. SUDDEN: \"Gradual or sudden onset?\"      Gradual  5. PATTERN \"Does the pain come and go, or is it constant?\"      constant  6. SEVERITY: \"How bad is the pain?\"  (e.g., Scale 1-10; mild, moderate, or severe)      5/10  7. RECURRENT SYMPTOM: \"Have you ever had this type of stomach pain before?\" If Yes, ask: \"When was the last time?\" and \"What happened that time?\"       Yes but usually goes away  8. CAUSE: \"What do you think is causing the stomach pain?\"      unsure  9. RELIEVING/AGGRAVATING FACTORS: \"What makes it better or worse?\" (e.g., antacids, bending or twisting motion, bowel movement)      denies  10. OTHER SYMPTOMS: \"Do you have any other symptoms?\" (e.g., back pain, diarrhea, fever, urination pain, vomiting)        denies  11. PREGNANCY: \"Is there any chance you are pregnant?\" \"When was your last menstrual period?\"        Possibly, 11/4 spotting, 12/14 spotting    Protocols used: Abdominal Pain - Female-Adult-[place]    "

## 2024-12-17 NOTE — TELEPHONE ENCOUNTER
Reason for Disposition  • MILD TO MODERATE constant pain lasting > 2 hours    Protocols used: Abdominal Pain - Female-Adult-[place]

## 2024-12-18 NOTE — TELEPHONE ENCOUNTER
Pt called in to follow up on call from yesterday. States the pain subsided so she did not go to the ED. However it did return again last night/this morning. Currently experiencing 5/10 LLQ pain. She has not tried anything for the pain. She has been having light spotting as well. She has not had a period yet since delivery of her son, she is breastfeeding. Had a negative home pregnancy test yesterday. Pt concerned that she may have a cyst causing the pain. States she had a ruptured cyst in early pregnancy in the past, feels similar. Pt wondering if she can have an order for an US instead of going to the ED. Advised will review with MD. Pt aware to try tylenol/ibuprofen or heating pad for pain. If pain becomes severe, pt aware to call back/go to ED. Pt thankful.

## 2025-01-07 ENCOUNTER — OFFICE VISIT (OUTPATIENT)
Dept: OBGYN CLINIC | Facility: MEDICAL CENTER | Age: 30
End: 2025-01-07
Payer: COMMERCIAL

## 2025-01-07 VITALS
SYSTOLIC BLOOD PRESSURE: 115 MMHG | DIASTOLIC BLOOD PRESSURE: 68 MMHG | WEIGHT: 189.3 LBS | HEIGHT: 63 IN | BODY MASS INDEX: 33.54 KG/M2

## 2025-01-07 DIAGNOSIS — Z78.9 TRYING TO GET PREGNANT: ICD-10-CM

## 2025-01-07 DIAGNOSIS — N92.6 IRREGULAR MENSES: Primary | ICD-10-CM

## 2025-01-07 DIAGNOSIS — R10.2 PELVIC PAIN IN FEMALE: ICD-10-CM

## 2025-01-07 PROCEDURE — 99213 OFFICE O/P EST LOW 20 MIN: CPT | Performed by: OBSTETRICS & GYNECOLOGY

## 2025-01-07 NOTE — PATIENT INSTRUCTIONS
"Patient Education     How to plan and prepare for a healthy pregnancy   The Basics   Written by the doctors and editors at [facility]   Should I see a doctor before I try to get pregnant? -- Yes. Before you start trying, it's important to see your doctor or nurse for a \"pre-pregnancy check-up.\" They will ask you about things that could affect your pregnancy. For example, they might ask about your diet, lifestyle, use of birth control, past pregnancies, and medicines. They might also ask about any health conditions you have or that run in your family.  There are several things you can do to make sure that your pregnancy is as healthy as possible. Do these things before you try to get pregnant, if possible:   Talk to your doctor or nurse about any medicines or herbal drugs you take. They can tell you if you need to make any changes.   Discuss whether you are up to date on your vaccines.   Start taking a multivitamin that has folic acid (also called folate).   Know which foods you should avoid and which foods are best.   Stop smoking, drinking alcohol, or taking drugs that are not prescribed to you by a doctor.   Understand the risks to you and your baby if:   You have any medical conditions.   There are diseases that run in your family or your partner's family.   Discuss whether there are any harmful substances in your home or work.   Try to keep a healthy weight.  Each of these issues is explained in more detail below.  Ask if your medicines are safe -- If you take any medicines, supplements, or herbal drugs, ask your doctor if it is safe to keep taking them while you are pregnant or trying to get pregnant. Some medicines take a long time to leave your body completely, so it's important to plan ahead. In some cases, your doctor or nurse will want you to switch to different medicines that are safer for the baby.  It can be harmful to stop taking some medicines suddenly. Your doctor or nurse might need to slowly reduce " "your dose. This is especially important for if you take certain medicines used to treat seizures, high blood pressure, lupus, or rheumatoid arthritis.  Check if you need any vaccines -- If you want to get pregnant, it's important to be up to date on your vaccines. This includes vaccines against measles, mumps, rubella, tetanus, diphtheria, polio, chickenpox (also called varicella), and possibly hepatitis. Many people got these vaccines as children. Still, it is important to check that you have gotten all of the vaccines you need. If not, you could get sick with the diseases the vaccines protect against, which could cause problems for you or your baby. It's also important to get the COVID-19 vaccine, as well as a flu shot every year.  Some vaccines cannot be given during pregnancy or in the month before pregnancy. It's important to get these vaccines more than a month before you start trying to get pregnant.  Start taking a multivitamin -- If you want to get pregnant, take a \"prenatal\" multivitamin every day. It should have at least 400 micrograms of folic acid. This helps prevent some of the problems a baby can be born with. Start taking the multivitamin at least a month before you start trying to get pregnant. That's because by the time you find out that you are pregnant, your baby has already formed many body parts that rely on folic acid and other vitamins to develop normally.  Do not to take too much of any vitamin during pregnancy, especially vitamin A. Show your doctor or nurse the vitamins you plan to take. They can make sure that the doses are safe for you and your baby.  Check your diet -- In general, try to eat a balanced diet with lots of fruits, vegetables, and whole grains.  Some foods are not safe to eat during pregnancy. If you are trying to get pregnant, these tips can help you stay safe:   Do not eat raw or undercooked meat. Avoid eating shark, swordfish, seven mackerel, or tilefish because they can " "have high levels of mercury. Check with your doctor or nurse about the safety of fish caught in local rivers and lakes.   Limit the amount of caffeine you have to no more than 1 or 2 cups of coffee each day. Tea and cola also contain caffeine, but usually not as much as coffee.   Avoid germs in your food that could make you sick. Wash your hands with soap and water before preparing or touching food (figure 1). Wash or peel fruits and vegetables before eating them. Pregnant people should also avoid certain foods that can carry germs. These include deli meats and milk, cheese, or juice that has not been pasteurized (also called \"unpasteurized\").  Stop smoking, drinking alcohol, and taking illegal drugs -- If you smoke, drink alcohol, or take drugs not prescribed for you by a doctor, it is very important that you stop, now more than ever. Even small amounts of these substances during pregnancy can harm a baby. This includes electronic cigarettes (\"vaping\") and marijuana.  It's not enough to stop as soon as you find out that you are pregnant. By then, the baby has already begun to form and could be harmed by smoking, alcohol, or drugs. If you need help quitting, talk with your doctor or nurse. There are treatments that can help.  Ask about risks -- Ask your doctor what the risks to you and your baby might be if:   You have any medical conditions - If you have a medical problem, it could cause problems for you or your baby during pregnancy. People who have certain medical conditions should work with their doctor to get their conditions under control before they get pregnant. This includes people with diabetes, high blood pressure, asthma, thyroid conditions, seizure disorders, HIV infection, and other problems. If you have one of these conditions and it is not well controlled, it can cause problems for both you and your baby during pregnancy.   You or your partner has a family history of a medical condition - If you or " your partner has a history of a condition that could be passed on to your baby, your doctor might recommend genetic counseling. Genetic counseling can help you understand the chances that your baby will also have the condition. A genetic counselor can also talk to you about your options if your baby does have a genetic problem.  Check your home and work for harmful substances -- People often have chemicals or substances in their home or work that could hurt an unborn baby. Dealing with these substances can sometimes be complicated and time consuming, so it's important to plan ahead. For instance, people who live in homes built before 1978 often have lead paint on their walls or woodwork. Lead in chips or dust from this paint could harm a baby. Ask your doctor or nurse how to deal with lead and other harmful substances that might be around you.  Try to keep a healthy weight -- Weighing too little or too much can make it harder to get pregnant and lead to problems during pregnancy. Try to reach a healthy weight before you try to get pregnant.  Is there anything my partner can do? -- Yes. Some things they can do:   They should stop smoking and using drugs, and limit alcohol. The doctor or nurse might ask questions about their health and medical history, too.   It can help for your partner to support you. For example, they can make healthy changes to their diet along with you.   When you are ready to start trying to get pregnant, your doctor or nurse can talk to you about how to increase your chances. For pregnancy to happen through sex, your partner's sperm needs to fertilize your egg (figure 2). Having sex at certain times during your monthly cycle can increase the chances of pregnancy. To make sure that your partner's sperm are as healthy as possible, it can also help to have sex every 2 or 3 days, and for them to avoid ejaculating in between these times.  If you do not have a male partner and are planning to get  "pregnant in another way (for example, with a sperm donor), it can still be helpful to have the support of your partner, family, or friends. If you do not feel like you have support, or if you are struggling with relationships or feeling unsafe in any way, talk with your doctor or nurse. They can help.  All topics are updated as new evidence becomes available and our peer review process is complete.  This topic retrieved from [employer] on: Feb 26, 2024.  Topic 62598 Version 13.0  Release: 32.2.4 - C32.56  © 2024 UpToDate, Inc. and/or its affiliates. All rights reserved.  figure 1: How to wash your hands     Wet your hands with clean water, and apply a small amount of soap. Lather and rub hands together for at least 20 seconds. Clean your wrists, palms, backs of your hands, between your fingers, tips of your fingers, thumbs, and under and around your nails. Rinse well, and dry your hands using a clean towel.  Graphic 784487 Version 7.0  figure 2: How pregnancy happens     When pregnancy happens through sex, the following steps must happen:  An egg is released from the ovary. This is called \"ovulation.\"  The partner's sperm swim up the vagina, into the uterus, and up the fallopian tubes. (These are the tubes that connect the ovaries to the uterus.)  When the sperm reach the egg, at least 1 sperm must get through the outer casing of the egg and make it inside. This is called \"fertilization.\"  The newly fertilized egg travels down to the uterus.  The egg secures itself to the wall of the uterus. This is called \"implantation.\"  Graphic 96244 Version 6.0  Consumer Information Use and Disclaimer   Disclaimer: This generalized information is a limited summary of diagnosis, treatment, and/or medication information. It is not meant to be comprehensive and should be used as a tool to help the user understand and/or assess potential diagnostic and treatment options. It does NOT include all information about conditions, " treatments, medications, side effects, or risks that may apply to a specific patient. It is not intended to be medical advice or a substitute for the medical advice, diagnosis, or treatment of a health care provider based on the health care provider's examination and assessment of a patient's specific and unique circumstances. Patients must speak with a health care provider for complete information about their health, medical questions, and treatment options, including any risks or benefits regarding use of medications. This information does not endorse any treatments or medications as safe, effective, or approved for treating a specific patient. UpToDate, Inc. and its affiliates disclaim any warranty or liability relating to this information or the use thereof.The use of this information is governed by the Terms of Use, available at https://www.woltersSalesVuuwer.com/en/know/clinical-effectiveness-terms. 2024© UpToDate, Inc. and its affiliates and/or licensors. All rights reserved.  Copyright   © 2024 UpToDate, Inc. and/or its affiliates. All rights reserved.

## 2025-01-07 NOTE — PROGRESS NOTES
Assessment Mala was seen today for pelvic pain.    Diagnoses and all orders for this visit:    Irregular menses    Trying to get pregnant    Pelvic pain in female         Plan  Discussion with patient regarding nursing and its suppression of ovulation.  Patient plans to continue nursing for a full year.  Once she stops nursing, she will monitor/document the timing of her menses in addition to when she has episodes of pelvic pain.  It is unclear if the pelvic pain may be due to ovulation.  She will return in 3 months after she stops nursing to evaluate her menstrual frequency and need for further workup.  Patient given information on pregnancy planning.  All questions answered.    Subjective   Mala Jaramillo is a 29 y.o. female here for a problem visit.  Pt is nursing and is current decreasing the frequency of nursing.  For the past two weeks, son only nurses twice  a day.  In August, pt started trying to conceive.  Pt hasn't had a normal period.  Pt has had pelvic pain off and on. Pain was constant in December and reports she has had two episodes of spotting since August.  Pt tried testing with clear blue and is confused by the results.  Unsure if sx's are due to  her hormones.  Pt delivered .  Pt reports menses in the past were very irregular.  Pt get sharp pain, can last for 3 days.  Pain can alternate sides.  Was able to feel ovulations in the past.  Had originally planned to nurse for a total of 1 year.    Patient Active Problem List   Diagnosis    Allergic rhinitis    Generalized anxiety disorder    Acute nonintractable headache     (spontaneous vaginal delivery)    Round ligament pain    Other headache syndrome    Maternal varicella, non-immune       Gynecologic History  No LMP recorded.  The current method of family planning is none.    Past Medical History:   Diagnosis Date    Anxiety     Asthma     Disease of thyroid gland     Enteritis 12/10/2018    Hypothyroidism     Migraines     Pancytopenia  ([facility]) 12/12/2018     Past Surgical History:   Procedure Laterality Date    EXTERNAL EAR SURGERY       Family History   Problem Relation Age of Onset    Other Mother         Hysterectomy    Anemia Mother     Migraines Mother     Miscarriages / Stillbirths Mother     Hypertension Father     No Known Problems Sister     Diabetes Maternal Grandmother     Heart disease Maternal Grandmother     Hypertension Maternal Grandmother     Hyperlipidemia Maternal Grandmother     Alzheimer's disease Maternal Grandmother     Alzheimer's disease Maternal Grandfather     Cataracts Maternal Grandfather     Asthma Maternal Grandfather     Thyroid disease Maternal Grandfather     Stroke Paternal Grandmother     Thyroid disease Paternal Grandmother     Cancer Other         possibly uterine    Breast cancer Neg Hx     Colon cancer Neg Hx     Ovarian cancer Neg Hx      Social History     Socioeconomic History    Marital status: /Civil Union     Spouse name: Not on file    Number of children: Not on file    Years of education: Not on file    Highest education level: Not on file   Occupational History    Not on file   Tobacco Use    Smoking status: Never    Smokeless tobacco: Never   Vaping Use    Vaping status: Never Used   Substance and Sexual Activity    Alcohol use: Not Currently     Comment: social only    Drug use: No    Sexual activity: Yes     Partners: Male     Birth control/protection: Other   Other Topics Concern    Not on file   Social History Narrative    Not on file     Social Drivers of Health     Financial Resource Strain: Not on file   Food Insecurity: Not on file   Transportation Needs: Not on file   Physical Activity: Not on file   Stress: Not on file   Social Connections: Not on file   Intimate Partner Violence: Not on file   Housing Stability: Not on file     No Known Allergies    Current Outpatient Medications:     levothyroxine 25 mcg tablet, Take 1 tablet (25 mcg total) by mouth daily, Disp: 90 tablet, Rfl: 1    " Prenatal Vit-Fe Fumarate-FA (PRENATAL VITAMIN PO), Take by mouth, Disp: , Rfl:     Review of Systems  Constitutional :no fever, feels well, no tiredness, no recent weight gain or loss  ENT: no ear ache, no loss of hearing, no nosebleeds or nasal discharge, no sore throat or hoarseness.  Cardiovascular: no complaints of slow or fast heart beat, no chest pain, no palpitations, no leg claudication or lower extremity edema.  Respiratory: no complaints of shortness of shortness of breath, no RAMIREZ  Breasts:no complaints of breast pain, breast lump, or nipple discharge  Gastrointestinal: no complaints of abdominal pain, constipation, nausea, vomiting, or diarrhea or bloody stools  Genitourinary : no complaints of dysuria, incontinence, +pelvic pain, no dysmenorrhea, vaginal discharge, +abnormal vaginal bleeding and as noted in HPI.  Musculoskeletal: no complaints of arthralgia, no myalgia, no joint swelling or stiffness, no limb pain or swelling.  Integumentary: no complaints of skin rash or lesion, itching or dry skin  Neurological: no complaints of headache, no confusion, no numbness or tingling, no dizziness or fainting     Objective     /68   Ht 5' 3\" (1.6 m)   Wt 85.9 kg (189 lb 4.8 oz)   Breastfeeding Yes   BMI 33.53 kg/m²     General Appears stated age, cooperative, alert normal mood and affect   Psychiatric oriented to person, place and time.  Mood and affect normal      "

## 2025-01-20 ENCOUNTER — TELEPHONE (OUTPATIENT)
Age: 30
End: 2025-01-20

## 2025-01-20 NOTE — TELEPHONE ENCOUNTER
Spoke with patient who is 5w2d by LMP 12/14, D&V scheduled.. She is using the Taylor fertility device and noted her progesterone was at 6 yesterday.  She isn't sure what it should be during pregnancy or if this is ok or needs to be checked.

## 2025-01-22 NOTE — TELEPHONE ENCOUNTER
Call to pt and reviewed information. She verbalized understanding and is thankful. She states that she is having some mild intermittent cramping but isn't overly concerned as she had this in her first pregnancy as well. Advised that cramping in early pregnancy can be normal. Pt aware to call back if constant/severe pain unrelieved with tylenol or she has vaginal bleeding or other concerns/questions.

## 2025-02-06 ENCOUNTER — RESULTS FOLLOW-UP (OUTPATIENT)
Dept: FAMILY MEDICINE CLINIC | Facility: CLINIC | Age: 30
End: 2025-02-06

## 2025-02-06 ENCOUNTER — APPOINTMENT (OUTPATIENT)
Dept: LAB | Facility: HOSPITAL | Age: 30
End: 2025-02-06
Payer: COMMERCIAL

## 2025-02-06 ENCOUNTER — NURSE TRIAGE (OUTPATIENT)
Age: 30
End: 2025-02-06

## 2025-02-06 DIAGNOSIS — Z34.90 EARLY STAGE OF PREGNANCY: ICD-10-CM

## 2025-02-06 DIAGNOSIS — O21.9 NAUSEA AND VOMITING IN PREGNANCY: ICD-10-CM

## 2025-02-06 DIAGNOSIS — Z34.90 EARLY STAGE OF PREGNANCY: Primary | ICD-10-CM

## 2025-02-06 DIAGNOSIS — E03.9 ACQUIRED HYPOTHYROIDISM: ICD-10-CM

## 2025-02-06 LAB
ALBUMIN SERPL BCG-MCNC: 4.4 G/DL (ref 3.5–5)
ALP SERPL-CCNC: 69 U/L (ref 34–104)
ALT SERPL W P-5'-P-CCNC: 12 U/L (ref 7–52)
ANION GAP SERPL CALCULATED.3IONS-SCNC: 7 MMOL/L (ref 4–13)
AST SERPL W P-5'-P-CCNC: 15 U/L (ref 13–39)
BASOPHILS # BLD AUTO: 0.04 THOUSANDS/ΜL (ref 0–0.1)
BASOPHILS NFR BLD AUTO: 1 % (ref 0–1)
BILIRUB SERPL-MCNC: 0.65 MG/DL (ref 0.2–1)
BUN SERPL-MCNC: 14 MG/DL (ref 5–25)
CALCIUM SERPL-MCNC: 9.8 MG/DL (ref 8.4–10.2)
CHLORIDE SERPL-SCNC: 102 MMOL/L (ref 96–108)
CO2 SERPL-SCNC: 26 MMOL/L (ref 21–32)
CREAT SERPL-MCNC: 0.86 MG/DL (ref 0.6–1.3)
EOSINOPHIL # BLD AUTO: 0.28 THOUSAND/ΜL (ref 0–0.61)
EOSINOPHIL NFR BLD AUTO: 4 % (ref 0–6)
ERYTHROCYTE [DISTWIDTH] IN BLOOD BY AUTOMATED COUNT: 12.3 % (ref 11.6–15.1)
GFR SERPL CREATININE-BSD FRML MDRD: 91 ML/MIN/1.73SQ M
GLUCOSE SERPL-MCNC: 104 MG/DL (ref 65–140)
HCT VFR BLD AUTO: 38.2 % (ref 34.8–46.1)
HGB BLD-MCNC: 12.2 G/DL (ref 11.5–15.4)
IMM GRANULOCYTES # BLD AUTO: 0.04 THOUSAND/UL (ref 0–0.2)
IMM GRANULOCYTES NFR BLD AUTO: 1 % (ref 0–2)
LYMPHOCYTES # BLD AUTO: 2.55 THOUSANDS/ΜL (ref 0.6–4.47)
LYMPHOCYTES NFR BLD AUTO: 33 % (ref 14–44)
MCH RBC QN AUTO: 27.1 PG (ref 26.8–34.3)
MCHC RBC AUTO-ENTMCNC: 31.9 G/DL (ref 31.4–37.4)
MCV RBC AUTO: 85 FL (ref 82–98)
MONOCYTES # BLD AUTO: 0.32 THOUSAND/ΜL (ref 0.17–1.22)
MONOCYTES NFR BLD AUTO: 4 % (ref 4–12)
NEUTROPHILS # BLD AUTO: 4.42 THOUSANDS/ΜL (ref 1.85–7.62)
NEUTS SEG NFR BLD AUTO: 57 % (ref 43–75)
NRBC BLD AUTO-RTO: 0 /100 WBCS
PLATELET # BLD AUTO: 213 THOUSANDS/UL (ref 149–390)
PMV BLD AUTO: 10.9 FL (ref 8.9–12.7)
POTASSIUM SERPL-SCNC: 4.1 MMOL/L (ref 3.5–5.3)
PROT SERPL-MCNC: 7.4 G/DL (ref 6.4–8.4)
RBC # BLD AUTO: 4.5 MILLION/UL (ref 3.81–5.12)
SODIUM SERPL-SCNC: 135 MMOL/L (ref 135–147)
T4 FREE SERPL-MCNC: 0.91 NG/DL (ref 0.61–1.12)
TSH SERPL DL<=0.05 MIU/L-ACNC: 1.22 UIU/ML (ref 0.45–4.5)
WBC # BLD AUTO: 7.65 THOUSAND/UL (ref 4.31–10.16)

## 2025-02-06 PROCEDURE — 84443 ASSAY THYROID STIM HORMONE: CPT

## 2025-02-06 PROCEDURE — 84439 ASSAY OF FREE THYROXINE: CPT

## 2025-02-06 PROCEDURE — 85025 COMPLETE CBC W/AUTO DIFF WBC: CPT

## 2025-02-06 PROCEDURE — 36415 COLL VENOUS BLD VENIPUNCTURE: CPT

## 2025-02-06 PROCEDURE — 80053 COMPREHEN METABOLIC PANEL: CPT

## 2025-02-06 RX ORDER — METOCLOPRAMIDE 5 MG/1
5 TABLET ORAL 3 TIMES DAILY PRN
Qty: 30 TABLET | Refills: 0 | Status: SHIPPED | OUTPATIENT
Start: 2025-02-06

## 2025-02-06 NOTE — TELEPHONE ENCOUNTER
"Pt calling in stating that she's experiencing mild nausea, and dizziness. Pts LMP is 12/14/25 and she has a D&V US 2/19/25. Pt stating that she's dizzy daily, pt denies feeling dizzy now. Pt is taking prenatals and magnesium. Pt is requesting to have medications for nausea prescribed.  Pt has been having soft stools, pt denies diarrhea, or fevers    Epic Secure Chat sent to Alyse Johns CNM- on call  Epic Secure Chat received:  Reglan 5 mg 3 times daily as needed, may need to increase if not controlling symptoms.  Also placed some lab work to check hemoglobin since she had a baby in 2024.  To wait for lab work until safe to go out.  To continue with trying to hydrate with ginger ale, ice pops if available, fluids with electrolytes    Pt was contacted and notified of the providers recommendations, pt was also notified to call back if her nausea worsens, vomiting that is worsening, feeling like she's dizzy or going to pass out, Pt verbalized understanding, no further questions at this time        Answer Assessment - Initial Assessment Questions  1. SEVERITY - NAUSEA: \"How bad is the nausea?\" (e.g., none; mild, moderate, severe)      Mild nausea   2. SEVERITY - VOMITING: \"Are you vomiting?\" If Yes, ask: \"How many times have you vomited in the past 24 hours?\" (e.g., nausea only; mild, moderate or severe vomiting)      Pt hasn't vomited    3. ONSET: \"When did the nausea or vomiting begin?\"       2 weeks ago   4. FLUIDS: \"What fluids or food have you vomited up today?\" \"Are you able to keep any liquids down?\"      N/a   5. TREATMENT: \"What have you been doing so far to treat this?\"       N/a   6. DEHYDRATION: \"When was the last time you urinated?\" \"Are you feeling lightheaded?\" \"Weight loss?\"      Pt last urinated this morning about 10 min ago   7. PREGNANCY: \"How many weeks pregnant are you?\" \"How has the pregnancy been going?\"      LMP 12/14/25   8. FRANK: \"What date are you expecting to deliver?\"      N/a   9. MEDICINES: " "\"What medicines are you taking?\" (e.g., iron, opioid pain medicines, prenatal vitamins, vitamin B6)      Pt is taking prenatals and magnesium   10. OTHER SYMPTOMS: \"Do you have any other symptoms?\" (e.g., diarrhea, fever)        Pt has been having soft stools, pt denies diarrhea, or fevers    Protocols used: Pregnancy - Morning Sickness (Nausea and Vomiting of Pregnancy)-Adult-[place]    "

## 2025-02-07 ENCOUNTER — RESULTS FOLLOW-UP (OUTPATIENT)
Dept: OBGYN CLINIC | Facility: CLINIC | Age: 30
End: 2025-02-07

## 2025-02-07 ENCOUNTER — APPOINTMENT (EMERGENCY)
Dept: ULTRASOUND IMAGING | Facility: HOSPITAL | Age: 30
End: 2025-02-07
Payer: COMMERCIAL

## 2025-02-07 ENCOUNTER — NURSE TRIAGE (OUTPATIENT)
Age: 30
End: 2025-02-07

## 2025-02-07 ENCOUNTER — HOSPITAL ENCOUNTER (EMERGENCY)
Facility: HOSPITAL | Age: 30
Discharge: HOME | End: 2025-02-07
Attending: INTERNAL MEDICINE
Payer: COMMERCIAL

## 2025-02-07 VITALS
HEART RATE: 82 BPM | SYSTOLIC BLOOD PRESSURE: 126 MMHG | TEMPERATURE: 98 F | WEIGHT: 185.63 LBS | BODY MASS INDEX: 32.88 KG/M2 | OXYGEN SATURATION: 100 % | RESPIRATION RATE: 16 BRPM | DIASTOLIC BLOOD PRESSURE: 58 MMHG

## 2025-02-07 DIAGNOSIS — O26.899 ABDOMINAL PAIN DURING PREGNANCY: Primary | ICD-10-CM

## 2025-02-07 DIAGNOSIS — R10.9 ABDOMINAL PAIN DURING PREGNANCY: Primary | ICD-10-CM

## 2025-02-07 DIAGNOSIS — O21.9 NAUSEA/VOMITING IN PREGNANCY: ICD-10-CM

## 2025-02-07 LAB
ALBUMIN SERPL BCG-MCNC: 4.3 G/DL (ref 3.5–5)
ALP SERPL-CCNC: 65 U/L (ref 34–104)
ALT SERPL W P-5'-P-CCNC: 12 U/L (ref 7–52)
ANION GAP SERPL CALCULATED.3IONS-SCNC: 6 MMOL/L (ref 4–13)
AST SERPL W P-5'-P-CCNC: 14 U/L (ref 13–39)
B-HCG SERPL-ACNC: ABNORMAL MIU/ML (ref 0–5)
BASOPHILS # BLD AUTO: 0.04 THOUSANDS/ΜL (ref 0–0.1)
BASOPHILS NFR BLD AUTO: 1 % (ref 0–1)
BILIRUB SERPL-MCNC: 0.73 MG/DL (ref 0.2–1)
BILIRUB UR QL STRIP: NEGATIVE
BUN SERPL-MCNC: 9 MG/DL (ref 5–25)
CALCIUM SERPL-MCNC: 9.4 MG/DL (ref 8.4–10.2)
CHLORIDE SERPL-SCNC: 102 MMOL/L (ref 96–108)
CLARITY UR: CLEAR
CLARITY, POC: CLEAR
CO2 SERPL-SCNC: 27 MMOL/L (ref 21–32)
COLOR UR: YELLOW
COLOR, POC: COLORLESS
CREAT SERPL-MCNC: 0.6 MG/DL (ref 0.6–1.3)
EOSINOPHIL # BLD AUTO: 0.23 THOUSAND/ΜL (ref 0–0.61)
EOSINOPHIL NFR BLD AUTO: 3 % (ref 0–6)
ERYTHROCYTE [DISTWIDTH] IN BLOOD BY AUTOMATED COUNT: 12.5 % (ref 11.6–15.1)
EXT PREGNANCY TEST URINE: POSITIVE
EXT. CONTROL: ABNORMAL
GFR SERPL CREATININE-BSD FRML MDRD: 123 ML/MIN/1.73SQ M
GLUCOSE SERPL-MCNC: 93 MG/DL (ref 65–140)
GLUCOSE UR STRIP-MCNC: NEGATIVE MG/DL
HCT VFR BLD AUTO: 37.5 % (ref 34.8–46.1)
HGB BLD-MCNC: 12.1 G/DL (ref 11.5–15.4)
HGB UR QL STRIP.AUTO: NEGATIVE
IMM GRANULOCYTES # BLD AUTO: 0.03 THOUSAND/UL (ref 0–0.2)
IMM GRANULOCYTES NFR BLD AUTO: 0 % (ref 0–2)
KETONES UR STRIP-MCNC: NEGATIVE MG/DL
LEUKOCYTE ESTERASE UR QL STRIP: NEGATIVE
LYMPHOCYTES # BLD AUTO: 2.21 THOUSANDS/ΜL (ref 0.6–4.47)
LYMPHOCYTES NFR BLD AUTO: 31 % (ref 14–44)
MCH RBC QN AUTO: 27.3 PG (ref 26.8–34.3)
MCHC RBC AUTO-ENTMCNC: 32.3 G/DL (ref 31.4–37.4)
MCV RBC AUTO: 85 FL (ref 82–98)
MONOCYTES # BLD AUTO: 0.34 THOUSAND/ΜL (ref 0.17–1.22)
MONOCYTES NFR BLD AUTO: 5 % (ref 4–12)
NEUTROPHILS # BLD AUTO: 4.2 THOUSANDS/ΜL (ref 1.85–7.62)
NEUTS SEG NFR BLD AUTO: 60 % (ref 43–75)
NITRITE UR QL STRIP: NEGATIVE
NRBC BLD AUTO-RTO: 0 /100 WBCS
PH UR STRIP.AUTO: 7 [PH] (ref 4.5–8)
PLATELET # BLD AUTO: 187 THOUSANDS/UL (ref 149–390)
PMV BLD AUTO: 11 FL (ref 8.9–12.7)
POTASSIUM SERPL-SCNC: 4.4 MMOL/L (ref 3.5–5.3)
PROT SERPL-MCNC: 6.8 G/DL (ref 6.4–8.4)
PROT UR STRIP-MCNC: NEGATIVE MG/DL
RBC # BLD AUTO: 4.44 MILLION/UL (ref 3.81–5.12)
SODIUM SERPL-SCNC: 135 MMOL/L (ref 135–147)
SP GR UR STRIP.AUTO: 1.01 (ref 1–1.03)
UROBILINOGEN UR QL STRIP.AUTO: 0.2 E.U./DL
WBC # BLD AUTO: 7.05 THOUSAND/UL (ref 4.31–10.16)

## 2025-02-07 PROCEDURE — 81025 URINE PREGNANCY TEST: CPT

## 2025-02-07 PROCEDURE — 80053 COMPREHEN METABOLIC PANEL: CPT | Performed by: INTERNAL MEDICINE

## 2025-02-07 PROCEDURE — 87086 URINE CULTURE/COLONY COUNT: CPT

## 2025-02-07 PROCEDURE — 84702 CHORIONIC GONADOTROPIN TEST: CPT | Performed by: INTERNAL MEDICINE

## 2025-02-07 PROCEDURE — 36415 COLL VENOUS BLD VENIPUNCTURE: CPT | Performed by: INTERNAL MEDICINE

## 2025-02-07 PROCEDURE — 85025 COMPLETE CBC W/AUTO DIFF WBC: CPT | Performed by: INTERNAL MEDICINE

## 2025-02-07 PROCEDURE — 81003 URINALYSIS AUTO W/O SCOPE: CPT

## 2025-02-07 PROCEDURE — 76801 OB US < 14 WKS SINGLE FETUS: CPT

## 2025-02-07 PROCEDURE — 99284 EMERGENCY DEPT VISIT MOD MDM: CPT | Performed by: INTERNAL MEDICINE

## 2025-02-07 PROCEDURE — 99284 EMERGENCY DEPT VISIT MOD MDM: CPT

## 2025-02-07 RX ORDER — PYRIDOXINE HCL (VITAMIN B6) 25 MG
25 TABLET ORAL EVERY 8 HOURS
Qty: 90 TABLET | Refills: 0 | Status: SHIPPED | OUTPATIENT
Start: 2025-02-07

## 2025-02-07 RX ORDER — SENNOSIDES 8.6 MG
650 CAPSULE ORAL EVERY 8 HOURS PRN
Qty: 30 TABLET | Refills: 0 | Status: SHIPPED | OUTPATIENT
Start: 2025-02-07

## 2025-02-07 NOTE — TELEPHONE ENCOUNTER
"Pt calling in statign that she's pregnant, LMP is 12/14/24, D&V US on 2/19/25. Pt c/o having sharp left abdominal pain, and dizziness and nausea. The pain comes and goes 5/10 sharp pain.   Pt c/o nausea, dizziness, and abdominal pain.    Pt denies back pain, diarrhea, fever, urination pain, vaginal bleeding, vaginal discharge, vomiting      Pt is advised to go to the emergency room now for further evaluation, pt is advised to leave now, pt will be arriving in about 30 minutes.       Epic Secure Chat sent to Eliana Bauman- on call      Reason for Disposition   Feeling weak or lightheaded (e.g., woozy, feeling like they might faint)    Answer Assessment - Initial Assessment Questions  1. LOCATION: \"Where does it hurt?\"       Lower abdomen left side   2. RADIATION: \"Does the pain shoot anywhere else?\" (e.g., chest, back, shoulder)      Pt denies   3. ONSET: \"When did the pain begin?\" (e.g., minutes, hours or days ago)       November 4. ONSET: \"Gradual or sudden onset?\"      Gradual   5. PATTERN \"Does the pain come and go, or has it been constant since it started?\"       Comes and goes   6. SEVERITY: \"How bad is the pain?\" \"What does it keep you from doing?\"  (e.g., Scale 1-10; mild, moderate, or severe)      5/10 sharp pain   7. RECURRENT SYMPTOM: \"Have you ever had this type of stomach pain before?\" If Yes, ask: \"When was the last time?\" and \"What happened that time?\"       Pt reporting yes   8. CAUSE: \"What do you think is causing the stomach pain?\"      Pt unsure   9. RELIEVING/AGGRAVATING FACTORS: \"What makes it better or worse?\" (e.g., antacids, bowel movement, movement)      Pt denies   10. OTHER SYMPTOMS: \"Do you have any other symptoms?\" (e.g., back pain, diarrhea, fever, urination pain, vaginal bleeding, vaginal discharge, vomiting)    Pt c/o nausea, dizziness, and abdominal pain.    Pt denies back pain, diarrhea, fever, urination pain, vaginal bleeding, vaginal discharge, vomiting  11. FRANK: \"What date are " "you expecting to deliver?\"        N.a    Protocols used: Pregnancy - Abdominal Pain Less Than 20 Weeks EGA-Adult-[place]    "

## 2025-02-07 NOTE — Clinical Note
Mala Jaramillo was seen and treated in our emergency department on 2/7/2025.                Diagnosis:     Mala  .    She may return on this date: 02/08/2025         If you have any questions or concerns, please don't hesitate to call.      Jennifer Bronson MD    ______________________________           _______________          _______________  Hospital Representative                              Date                                Time

## 2025-02-07 NOTE — ED PROVIDER NOTES
Time reflects when diagnosis was documented in both MDM as applicable and the Disposition within this note       Time User Action Codes Description Comment    2/7/2025  1:53 PM Jennifer Bronson Add [O26.899,  R10.9] Abdominal pain during pregnancy     2/7/2025  1:54 PM Jennifer Bronson Add [O21.9] Nausea/vomiting in pregnancy           ED Disposition       ED Disposition   Discharge    Condition   Stable    Date/Time   Fri Feb 7, 2025  1:53 PM    Comment   Mala Jaramillo discharge to home/self care.                   Assessment & Plan       Medical Decision Making  Differential diagnosis includes pregnancy, ectopic pregnancy, UTI, ovarian cyst.  Given presentation and exam, less likely be ovarian torsion, will obtain ultrasound to rule out decreased blood flow    Amount and/or Complexity of Data Reviewed  Labs: ordered.  Radiology: ordered.    Risk  OTC drugs.        ED Course as of 02/07/25 1430   Fri Feb 07, 2025   1219 Blood type reviewed, patient is O+       Medications - No data to display    ED Risk Strat Scores                          SBIRT 22yo+      Flowsheet Row Most Recent Value   Initial Alcohol Screen: US AUDIT-C     1. How often do you have a drink containing alcohol? 0 Filed at: 02/07/2025 1149   2. How many drinks containing alcohol do you have on a typical day you are drinking?  0 Filed at: 02/07/2025 1149   3b. FEMALE Any Age, or MALE 65+: How often do you have 4 or more drinks on one occassion? 0 Filed at: 02/07/2025 1149   Audit-C Score 0 Filed at: 02/07/2025 1149   TORREY: How many times in the past year have you...    Used an illegal drug or used a prescription medication for non-medical reasons? Never Filed at: 02/07/2025 1149                            History of Present Illness       Chief Complaint   Patient presents with    Abdominal Pain     Pt reports positive pregnancy test at home, last period 12/14 reports first OB appointment next week. C/o LLQ pain and nausea. Denies vaginal bleeding        Past Medical History:   Diagnosis Date    Anxiety     Asthma     Disease of thyroid gland     Enteritis 12/10/2018    Hypothyroidism     Migraines     Pancytopenia (HCC) 2018      Past Surgical History:   Procedure Laterality Date    EXTERNAL EAR SURGERY        Family History   Problem Relation Age of Onset    Other Mother         Hysterectomy    Anemia Mother     Migraines Mother     Miscarriages / Stillbirths Mother     Hypertension Father     No Known Problems Sister     Diabetes Maternal Grandmother     Heart disease Maternal Grandmother     Hypertension Maternal Grandmother     Hyperlipidemia Maternal Grandmother     Alzheimer's disease Maternal Grandmother     Alzheimer's disease Maternal Grandfather     Cataracts Maternal Grandfather     Asthma Maternal Grandfather     Thyroid disease Maternal Grandfather     Stroke Paternal Grandmother     Thyroid disease Paternal Grandmother     Cancer Other         possibly uterine    Breast cancer Neg Hx     Colon cancer Neg Hx     Ovarian cancer Neg Hx       Social History     Tobacco Use    Smoking status: Never    Smokeless tobacco: Never   Vaping Use    Vaping status: Never Used   Substance Use Topics    Alcohol use: Not Currently     Comment: social only    Drug use: No      E-Cigarette/Vaping    E-Cigarette Use Never User       E-Cigarette/Vaping Substances    Nicotine No     THC No     CBD No     Flavoring No     Other No     Unknown No       I have reviewed and agree with the history as documented.     29-year-old  woman presents to ED for evaluation of left lower quadrant abdominal pain.  Patient reports left lower quadrant abdominal pain started in November.  She states her last period was 2024.  She states she does not know how far along she is because she was breast-feeding and had irregular menstrual cycles.  Patient reports she has a scheduled OB/GYN appointment on the .  She denies vaginal bleeding or fluid loss or vaginal  discharge.  She also reports nausea for the last few weeks.  States she manages the at home with frequent, small meals. Hasn't tried any mediations or vitamins. No nausea.  No other complaints or concerns        Abdominal Pain  Associated symptoms: nausea    Associated symptoms: no vomiting        Review of Systems   Gastrointestinal:  Positive for abdominal pain and nausea. Negative for vomiting.           Objective       ED Triage Vitals [02/07/25 1136]   Temperature Pulse Blood Pressure Respirations SpO2 Patient Position - Orthostatic VS   98 °F (36.7 °C) 82 126/58 16 100 % Sitting      Temp Source Heart Rate Source BP Location FiO2 (%) Pain Score    Oral Monitor Right arm -- 5      Vitals      Date and Time Temp Pulse SpO2 Resp BP Pain Score FACES Pain Rating User   02/07/25 1136 98 °F (36.7 °C) 82 100 % 16 126/58 5 -- CO            Physical Exam  On exam the patient is awake, alert, and comfortable. Mucous membranes are moist. The patient's heart is regular. No respiratory distress.  Abdomen non-distended. Moves all extremities. Patient neurologically nonfocal. No skin rashes. Back without deformities.       Results Reviewed       Procedure Component Value Units Date/Time    hCG, quantitative [[id number]]  (Abnormal) Collected: 02/07/25 1227    Lab Status: Final result Specimen: Blood from Arm, Right Updated: 02/07/25 1317     HCG, Quant 30,203.0 mIU/mL     Narrative:       Expected Ranges:    HCG results between 5.0 and 25.0 mIU/mL may be indicative of early pregnancy but should be interpreted in light of the total clinical presentation.    HCG can rise to detectable levels in grant and post menopausal women (0-11.6 mIU/mL).     Approximate               Approximate HCG  Gestation age          Concentration ( mIU/mL)  _____________          ______________________   Weeks                      HCG values  0.2-1                       5-50  1-2                           2-3                          100-5000  3-4                         500-27586  4-5                         1000-60559  5-6                         [id number]  6-8                         [id number]  8-12                        [id number]      Comprehensive metabolic panel [[id number]] Collected: 02/07/25 1227    Lab Status: Final result Specimen: Blood from Arm, Right Updated: 02/07/25 1247     Sodium 135 mmol/L      Potassium 4.4 mmol/L      Chloride 102 mmol/L      CO2 27 mmol/L      ANION GAP 6 mmol/L      BUN 9 mg/dL      Creatinine 0.60 mg/dL      Glucose 93 mg/dL      Calcium 9.4 mg/dL      AST 14 U/L      ALT 12 U/L      Alkaline Phosphatase 65 U/L      Total Protein 6.8 g/dL      Albumin 4.3 g/dL      Total Bilirubin 0.73 mg/dL      eGFR 123 ml/min/1.73sq m     Narrative:      National Kidney Disease Foundation guidelines for Chronic Kidney Disease (CKD):     Stage 1 with normal or high GFR (GFR > 90 mL/min/1.73 square meters)    Stage 2 Mild CKD (GFR = 60-89 mL/min/1.73 square meters)    Stage 3A Moderate CKD (GFR = 45-59 mL/min/1.73 square meters)    Stage 3B Moderate CKD (GFR = 30-44 mL/min/1.73 square meters)    Stage 4 Severe CKD (GFR = 15-29 mL/min/1.73 square meters)    Stage 5 End Stage CKD (GFR <15 mL/min/1.73 square meters)  Note: GFR calculation is accurate only with a steady state creatinine    CBC and differential [[id number]] Collected: 02/07/25 1227    Lab Status: Final result Specimen: Blood from Arm, Right Updated: 02/07/25 1232     WBC 7.05 Thousand/uL      RBC 4.44 Million/uL      Hemoglobin 12.1 g/dL      Hematocrit 37.5 %      MCV 85 fL      MCH 27.3 pg      MCHC 32.3 g/dL      RDW 12.5 %      MPV 11.0 fL      Platelets 187 Thousands/uL      nRBC 0 /100 WBCs      Segmented % 60 %      Immature Grans % 0 %      Lymphocytes % 31 %      Monocytes % 5 %      Eosinophils Relative 3 %      Basophils Relative 1 %      Absolute Neutrophils 4.20 Thousands/µL      Absolute Immature Grans 0.03 Thousand/uL      Absolute  Lymphocytes 2.21 Thousands/µL      Absolute Monocytes 0.34 Thousand/µL      Eosinophils Absolute 0.23 Thousand/µL      Basophils Absolute 0.04 Thousands/µL     Urine culture [[id number]] Collected: 02/07/25 1145    Lab Status: In process Specimen: Urine, Clean Catch Updated: 02/07/25 1152    POCT pregnancy, urine [[id number]]  (Abnormal) Collected: 02/07/25 1148    Lab Status: Final result Updated: 02/07/25 1148     EXT Preg Test, Ur Positive     Control Valid    POCT urinalysis dipstick [[id number]]  (Normal) Collected: 02/07/25 1148    Lab Status: Final result Specimen: Urine, Other Updated: 02/07/25 1148     Color, UA Colorless     Clarity, UA Clear     EXT Leukocytes, UA --     Nitrite, UA --     Protein, UA -- mg/dl      Glucose, UA --     Ketones, UA -- mg/dl      EXT Urobilinogen, UA --      Bilirubin, UA --     Blood, UA --    Urine Macroscopic, POC [[id number]] Collected: 02/07/25 1145    Lab Status: Final result Specimen: Urine Updated: 02/07/25 1146     Color, UA Yellow     Clarity, UA Clear     pH, UA 7.0     Leukocytes, UA Negative     Nitrite, UA Negative     Protein, UA Negative mg/dl      Glucose, UA Negative mg/dl      Ketones, UA Negative mg/dl      Urobilinogen, UA 0.2 E.U./dl      Bilirubin, UA Negative     Occult Blood, UA Negative     Specific Gravity, UA 1.010    Narrative:      CLINITEK RESULT            US OB < 14 weeks with transvaginal   Final Interpretation by Jerrell Irby MD (02/07 1326)      Single intrauterine gestation at 5 weeks 5 days (range +/- 4 days). Cardiac activity is not discretely visualized on the current exam.      Flow is demonstrated to the ovaries bilaterally.                  Workstation performed: [id number]             Procedures    ED Medication and Procedure Management   Prior to Admission Medications   Prescriptions Last Dose Informant Patient Reported? Taking?   Prenatal Vit-Fe Fumarate-FA (PRENATAL VITAMIN PO)  Self Yes No   Sig: Take by mouth   levothyroxine 25 mcg  tablet   No No   Sig: Take 1 tablet (25 mcg total) by mouth daily   metoclopramide (REGLAN) 5 mg tablet   No No   Sig: Take 1 tablet (5 mg total) by mouth 3 (three) times a day as needed (for nausea and vomiting as needed)      Facility-Administered Medications: None     Discharge Medication List as of 2/7/2025  1:55 PM        START taking these medications    Details   acetaminophen (TYLENOL) 650 mg CR tablet Take 1 tablet (650 mg total) by mouth every 8 (eight) hours as needed for mild pain, Starting Fri 2/7/2025, Normal      pyridoxine (B-6) 25 MG tablet Take 1 tablet (25 mg total) by mouth every 8 (eight) hours, Starting Fri 2/7/2025, Normal           CONTINUE these medications which have NOT CHANGED    Details   levothyroxine 25 mcg tablet Take 1 tablet (25 mcg total) by mouth daily, Starting Thu 9/12/2024, Normal      metoclopramide (REGLAN) 5 mg tablet Take 1 tablet (5 mg total) by mouth 3 (three) times a day as needed (for nausea and vomiting as needed), Starting Thu 2/6/2025, Normal      Prenatal Vit-Fe Fumarate-FA (PRENATAL VITAMIN PO) Take by mouth, Historical Med           No discharge procedures on file.  ED SEPSIS DOCUMENTATION   Time reflects when diagnosis was documented in both MDM as applicable and the Disposition within this note       Time User Action Codes Description Comment    2/7/2025  1:53 PM Jennifer Bronson [O26.899,  R10.9] Abdominal pain during pregnancy     2/7/2025  1:54 PM Jennifer Bronson [O21.9] Nausea/vomiting in pregnancy                  Jennifer Bronson MD  02/07/25 9523

## 2025-02-08 LAB — BACTERIA UR CULT: NORMAL

## 2025-02-18 NOTE — PROGRESS NOTES
"Pregnancy Confirmation Visit  [facility] [address] Nick Kendall PA    Assessment/Plan:  29 y.o.  presenting with missed menses.      Patient's last menstrual period was 2024 (within weeks).  EDC - 25   @ 9w4d  Pt was breast feeding and tracking her ovulation so knows last ovulation was mid saul     ED sonogram - 5w5d - ? Fetal heart tones.     Sonogram EDC  10/5/25      Final EDC 10/5/25    by    US             - Continue/start prenatal vitamin  - We reviewed her current medications and discussed which are safe to continue in pregnancy  - We briefly discussed options for aneuploidy screening, to be discussed further at the prenatal intake  - Schedule prenatal intake with RN and initial prenatal visit; prenatal labs will be ordered during the prenatal intake      Subjective:    CC: Missed period    Mala Jaramillo is a 29 y.o.  who presents with missed menses.  Patient's last menstrual period was 2024 (within weeks).    Patient notes that this pregnancy was planned and desired.  She was not using contraception at the time of conception. She reports she is certain of her LMP and that she has regular menses. She has has no vaginal bleeding since her LMP.    Objective:  BP 98/60   Ht 5' 3\" (1.6 m)   Wt 85.3 kg (188 lb)   LMP 2024 (Within Weeks)   Breastfeeding No   BMI 33.30 kg/m²     Physical Exam:  General: Well appearing, no distress  CV: Regular rate  Respiratory: Unlabored breathing  Abdomen: Soft, nontender  Extremities: Without edema  Mood and Affect: Appropriate    TransAB  Pelvic Ultrasound  Valadez IUP  Yolk sac: Present  Fetal Pole: Present  CRL consistent with EGA 7w4d  Cardiac activity: Present   bpm  No adnexal masses appreciated            "

## 2025-02-19 ENCOUNTER — TELEPHONE (OUTPATIENT)
Age: 30
End: 2025-02-19

## 2025-02-19 ENCOUNTER — ULTRASOUND (OUTPATIENT)
Dept: OBGYN CLINIC | Facility: CLINIC | Age: 30
End: 2025-02-19
Payer: COMMERCIAL

## 2025-02-19 VITALS
WEIGHT: 188 LBS | DIASTOLIC BLOOD PRESSURE: 60 MMHG | BODY MASS INDEX: 33.31 KG/M2 | SYSTOLIC BLOOD PRESSURE: 98 MMHG | HEIGHT: 63 IN

## 2025-02-19 DIAGNOSIS — Z32.01 PREGNANCY EXAMINATION OR TEST, POSITIVE RESULT: Primary | ICD-10-CM

## 2025-02-19 PROCEDURE — 99214 OFFICE O/P EST MOD 30 MIN: CPT | Performed by: OBSTETRICS & GYNECOLOGY

## 2025-02-19 PROCEDURE — 76801 OB US < 14 WKS SINGLE FETUS: CPT | Performed by: OBSTETRICS & GYNECOLOGY

## 2025-02-19 RX ORDER — MAGNESIUM L-LACTATE 84 MG
84 TABLET, EXTENDED RELEASE ORAL DAILY
COMMUNITY

## 2025-02-19 NOTE — TELEPHONE ENCOUNTER
Patient called, was advised to complete their 1hr glucose early d/t hx however pt unsure when recommended to complete, no expected date on order.   Attempted to contact office clinical. Please reach out to pt to advise.

## 2025-02-20 ENCOUNTER — PATIENT MESSAGE (OUTPATIENT)
Dept: OBGYN CLINIC | Facility: CLINIC | Age: 30
End: 2025-02-20

## 2025-02-28 ENCOUNTER — NURSE TRIAGE (OUTPATIENT)
Age: 30
End: 2025-02-28

## 2025-02-28 DIAGNOSIS — O21.9 NAUSEA AND VOMITING IN PREGNANCY: Primary | ICD-10-CM

## 2025-02-28 DIAGNOSIS — E03.9 HYPOTHYROIDISM, UNSPECIFIED TYPE: ICD-10-CM

## 2025-02-28 RX ORDER — ONDANSETRON 4 MG/1
4 TABLET, FILM COATED ORAL EVERY 8 HOURS PRN
Qty: 30 TABLET | Refills: 2 | Status: SHIPPED | OUTPATIENT
Start: 2025-02-28

## 2025-02-28 NOTE — TELEPHONE ENCOUNTER
Will send in zofran for pt instead of current reglan. Would also encourage unisom qHS. It will be sedating, but at night shouldn't be an issue and may help with symptoms the next day. Continue other measures (b6, ke, dietary changes).    Would encourage to complete PN labs, as will add electrolyte testing to see if anything add't needed. Let her know OK to decline 1hr GTT for now. Perfectly reasonable that its difficult to complete in these circumstances and we will readdress after we get this under control.     Please stress importance of taking levothyroxine daily too. If cannot take early AM, its best to take when she can rather than not at all. Added TSH testing on as well given this factor.

## 2025-02-28 NOTE — TELEPHONE ENCOUNTER
"Call to pt. She is 8w5d . Reporting all day nausea for the last month or so, vomiting about once a day. States she is using vitamin B6, drinking ginger ale and eating small frequent snacks. She was prescribed reglan but did not like taking it as it made her dizzy/sleepy. Pt is able to stay hydrated, she is urinating well throughout the day. Does report intermittent dizziness but denies feeling like she will pass out. Encouraged her to continue hydrating well, eating small bland snacks throughout the day. Pt aware to call back if symptoms worsen- she is not able to hydrate, not urinating well throughout the day, worsening lightheadedness/dizziness. Pt concerned that she has to do the 1 hour glucose test- does not think she can do it without vomiting. Also states she has difficulty taking her levothyroxine in the mornings. Advised will review.     Reason for Disposition   Nausea or vomiting    Answer Assessment - Initial Assessment Questions  1. SEVERITY - NAUSEA: \"How bad is the nausea?\" (e.g., none; mild, moderate, severe)      All day nausea  2. SEVERITY - VOMITING: \"Are you vomiting?\" If Yes, ask: \"How many times have you vomited in the past 24 hours?\" (e.g., nausea only; mild, moderate or severe vomiting)      X1 daily  3. ONSET: \"When did the nausea or vomiting begin?\"       About one month ago  4. FLUIDS: \"What fluids or food have you vomited up today?\" \"Are you able to keep any liquids down?\"      X1 this morning, has been able to drink a little water/ginger ale. Tolerated scrambled eggs and crackers  5. TREATMENT: \"What have you been doing so far to treat this?\"       Vitamin B6, ginger ale, small frequent snacks  6. DEHYDRATION: \"When was the last time you urinated?\" \"Are you feeling lightheaded?\" \"Weight loss?\"      Last urinated about 10 minutes ago, urinating well throughout the day. Does feel a little dizzy, has hx of migraines- does not feel like she will pass out  7. PREGNANCY: \"How many weeks " "pregnant are you?\" \"How has the pregnancy been going?\"      8w5d  8. FRANK: \"What date are you expecting to deliver?\"      10/5/25  9. MEDICINES: \"What medicines are you taking?\" (e.g., iron, opioid pain medicines, prenatal vitamins, vitamin B6)      Prenatal vitamin- takes at night, levothyroxine in the morning-difficulty taking it in the morning  10. OTHER SYMPTOMS: \"Do you have any other symptoms?\" (e.g., diarrhea, fever)        denies    Protocols used: Pregnancy - Morning Sickness (Nausea and Vomiting of Pregnancy)-Adult-[place]    "

## 2025-02-28 NOTE — TELEPHONE ENCOUNTER
Call to pt and reviewed all information. She verbalized understanding and is thankful. She is requesting information on unisom be sent in a [employer] message.

## 2025-02-28 NOTE — TELEPHONE ENCOUNTER
Regardinw, nausea and vomitting  ----- Message from Michela DELUCA sent at 2025 11:59 AM EST -----  Pt is 8 weeks. She sent a myc message that she is feeling nausea all time, vomiting daily and having a hard time drinking even water. She is taking b6, snacking through out the day, drinking ginger ale and nothing is helping. It looks like she is also on Reglan but not sure if she is taking that or not.

## 2025-03-02 ENCOUNTER — APPOINTMENT (OUTPATIENT)
Dept: LAB | Facility: HOSPITAL | Age: 30
End: 2025-03-02
Payer: COMMERCIAL

## 2025-03-02 DIAGNOSIS — Z32.01 PREGNANCY EXAMINATION OR TEST, POSITIVE RESULT: ICD-10-CM

## 2025-03-02 DIAGNOSIS — E03.9 HYPOTHYROIDISM, UNSPECIFIED TYPE: ICD-10-CM

## 2025-03-02 DIAGNOSIS — O21.9 NAUSEA AND VOMITING IN PREGNANCY: ICD-10-CM

## 2025-03-02 LAB
ABO GROUP BLD: NORMAL
ALBUMIN SERPL BCG-MCNC: 4.1 G/DL (ref 3.5–5)
ALP SERPL-CCNC: 63 U/L (ref 34–104)
ALT SERPL W P-5'-P-CCNC: 13 U/L (ref 7–52)
ANION GAP SERPL CALCULATED.3IONS-SCNC: 5 MMOL/L (ref 4–13)
AST SERPL W P-5'-P-CCNC: 15 U/L (ref 13–39)
BASOPHILS # BLD AUTO: 0.03 THOUSANDS/ÂΜL (ref 0–0.1)
BASOPHILS NFR BLD AUTO: 0 % (ref 0–1)
BILIRUB SERPL-MCNC: 0.91 MG/DL (ref 0.2–1)
BILIRUB UR QL STRIP: NEGATIVE
BLD GP AB SCN SERPL QL: NEGATIVE
BUN SERPL-MCNC: 9 MG/DL (ref 5–25)
CALCIUM SERPL-MCNC: 9.3 MG/DL (ref 8.4–10.2)
CHLORIDE SERPL-SCNC: 103 MMOL/L (ref 96–108)
CLARITY UR: CLEAR
CO2 SERPL-SCNC: 27 MMOL/L (ref 21–32)
COLOR UR: YELLOW
CREAT SERPL-MCNC: 0.56 MG/DL (ref 0.6–1.3)
EOSINOPHIL # BLD AUTO: 0.17 THOUSAND/ÂΜL (ref 0–0.61)
EOSINOPHIL NFR BLD AUTO: 2 % (ref 0–6)
ERYTHROCYTE [DISTWIDTH] IN BLOOD BY AUTOMATED COUNT: 12.8 % (ref 11.6–15.1)
GFR SERPL CREATININE-BSD FRML MDRD: 126 ML/MIN/1.73SQ M
GLUCOSE P FAST SERPL-MCNC: 137 MG/DL (ref 65–99)
GLUCOSE UR STRIP-MCNC: ABNORMAL MG/DL
HCT VFR BLD AUTO: 38 % (ref 34.8–46.1)
HGB BLD-MCNC: 12.4 G/DL (ref 11.5–15.4)
HGB UR QL STRIP.AUTO: NEGATIVE
IMM GRANULOCYTES # BLD AUTO: 0.06 THOUSAND/UL (ref 0–0.2)
IMM GRANULOCYTES NFR BLD AUTO: 1 % (ref 0–2)
KETONES UR STRIP-MCNC: NEGATIVE MG/DL
LEUKOCYTE ESTERASE UR QL STRIP: NEGATIVE
LYMPHOCYTES # BLD AUTO: 1.87 THOUSANDS/ÂΜL (ref 0.6–4.47)
LYMPHOCYTES NFR BLD AUTO: 21 % (ref 14–44)
MCH RBC QN AUTO: 27.6 PG (ref 26.8–34.3)
MCHC RBC AUTO-ENTMCNC: 32.6 G/DL (ref 31.4–37.4)
MCV RBC AUTO: 85 FL (ref 82–98)
MONOCYTES # BLD AUTO: 0.29 THOUSAND/ÂΜL (ref 0.17–1.22)
MONOCYTES NFR BLD AUTO: 3 % (ref 4–12)
NEUTROPHILS # BLD AUTO: 6.4 THOUSANDS/ÂΜL (ref 1.85–7.62)
NEUTS SEG NFR BLD AUTO: 73 % (ref 43–75)
NITRITE UR QL STRIP: NEGATIVE
NRBC BLD AUTO-RTO: 0 /100 WBCS
PH UR STRIP.AUTO: 5.5 [PH]
PLATELET # BLD AUTO: 241 THOUSANDS/UL (ref 149–390)
PMV BLD AUTO: 10.9 FL (ref 8.9–12.7)
POTASSIUM SERPL-SCNC: 3.8 MMOL/L (ref 3.5–5.3)
PROT SERPL-MCNC: 7.2 G/DL (ref 6.4–8.4)
PROT UR STRIP-MCNC: NEGATIVE MG/DL
RBC # BLD AUTO: 4.49 MILLION/UL (ref 3.81–5.12)
RH BLD: POSITIVE
RUBV IGG SERPL IA-ACNC: 37.3 IU/ML
SODIUM SERPL-SCNC: 135 MMOL/L (ref 135–147)
SP GR UR STRIP.AUTO: 1.03 (ref 1–1.03)
SPECIMEN EXPIRATION DATE: NORMAL
T4 FREE SERPL-MCNC: 0.84 NG/DL (ref 0.61–1.12)
TSH SERPL DL<=0.05 MIU/L-ACNC: 0.42 UIU/ML (ref 0.45–4.5)
UROBILINOGEN UR STRIP-ACNC: <2 MG/DL
WBC # BLD AUTO: 8.82 THOUSAND/UL (ref 4.31–10.16)

## 2025-03-02 PROCEDURE — 80053 COMPREHEN METABOLIC PANEL: CPT

## 2025-03-02 PROCEDURE — 86900 BLOOD TYPING SEROLOGIC ABO: CPT

## 2025-03-02 PROCEDURE — 84439 ASSAY OF FREE THYROXINE: CPT

## 2025-03-02 PROCEDURE — 84443 ASSAY THYROID STIM HORMONE: CPT

## 2025-03-02 PROCEDURE — 87086 URINE CULTURE/COLONY COUNT: CPT

## 2025-03-02 PROCEDURE — 86901 BLOOD TYPING SEROLOGIC RH(D): CPT

## 2025-03-02 PROCEDURE — 83020 HEMOGLOBIN ELECTROPHORESIS: CPT

## 2025-03-02 PROCEDURE — 85025 COMPLETE CBC W/AUTO DIFF WBC: CPT

## 2025-03-02 PROCEDURE — 86762 RUBELLA ANTIBODY: CPT

## 2025-03-02 PROCEDURE — 87340 HEPATITIS B SURFACE AG IA: CPT

## 2025-03-02 PROCEDURE — 36415 COLL VENOUS BLD VENIPUNCTURE: CPT

## 2025-03-02 PROCEDURE — 86780 TREPONEMA PALLIDUM: CPT

## 2025-03-02 PROCEDURE — 86803 HEPATITIS C AB TEST: CPT

## 2025-03-02 PROCEDURE — 86850 RBC ANTIBODY SCREEN: CPT

## 2025-03-02 PROCEDURE — 86706 HEP B SURFACE ANTIBODY: CPT

## 2025-03-02 PROCEDURE — 87389 HIV-1 AG W/HIV-1&-2 AB AG IA: CPT

## 2025-03-03 ENCOUNTER — RESULTS FOLLOW-UP (OUTPATIENT)
Dept: OBGYN CLINIC | Facility: MEDICAL CENTER | Age: 30
End: 2025-03-03

## 2025-03-03 LAB
BACTERIA UR CULT: NORMAL
HBV SURFACE AB SER-ACNC: 376 MIU/ML
HBV SURFACE AG SER QL: NORMAL
HCV AB SER QL: NORMAL
HIV 1+2 AB+HIV1 P24 AG SERPL QL IA: NORMAL
TREPONEMA PALLIDUM IGG+IGM AB [PRESENCE] IN SERUM OR PLASMA BY IMMUNOASSAY: NORMAL

## 2025-03-06 ENCOUNTER — OFFICE VISIT (OUTPATIENT)
Dept: FAMILY MEDICINE CLINIC | Facility: CLINIC | Age: 30
End: 2025-03-06
Payer: COMMERCIAL

## 2025-03-06 VITALS
RESPIRATION RATE: 16 BRPM | OXYGEN SATURATION: 98 % | WEIGHT: 186 LBS | HEIGHT: 63 IN | SYSTOLIC BLOOD PRESSURE: 110 MMHG | HEART RATE: 69 BPM | BODY MASS INDEX: 32.96 KG/M2 | DIASTOLIC BLOOD PRESSURE: 70 MMHG | TEMPERATURE: 97.1 F

## 2025-03-06 DIAGNOSIS — Z00.00 HEALTH MAINTENANCE EXAMINATION: Primary | ICD-10-CM

## 2025-03-06 DIAGNOSIS — Z3A.09 9 WEEKS GESTATION OF PREGNANCY: ICD-10-CM

## 2025-03-06 DIAGNOSIS — E03.9 ACQUIRED HYPOTHYROIDISM: ICD-10-CM

## 2025-03-06 LAB
HGB A MFR BLD: 2.1 % (ref 1.8–3.2)
HGB A MFR BLD: 97.9 % (ref 96.4–98.8)
HGB F MFR BLD: 0 % (ref 0–2)
HGB FRACT BLD-IMP: NORMAL
HGB S MFR BLD: 0 %

## 2025-03-06 PROCEDURE — 99395 PREV VISIT EST AGE 18-39: CPT | Performed by: NURSE PRACTITIONER

## 2025-03-06 RX ORDER — LEVOTHYROXINE SODIUM 25 UG/1
25 TABLET ORAL DAILY
Qty: 90 TABLET | Refills: 1 | Status: SHIPPED | OUTPATIENT
Start: 2025-03-06

## 2025-03-06 NOTE — PROGRESS NOTES
Adult Annual Physical  Name: Mala Jaramillo      : 1995      MRN: [id number]  Encounter Provider: LIZ Lerma  Encounter Date: 3/6/2025   Encounter department: [facility]    Assessment & Plan  Health maintenance examination  Recheck 1 year or sooner if needed.        Acquired hypothyroidism  Last labs stable for pregnancy.  Continue to monitor through OB during pregnancy.  Refilled.  Orders:  •  levothyroxine 25 mcg tablet; Take 1 tablet (25 mcg total) by mouth daily    9 weeks gestation of pregnancy  Continue to follow with OB       Immunizations and preventive care screenings were discussed with patient today. Appropriate education was printed on patient's after visit summary.    Counseling:  Dental Health: discussed importance of regular tooth brushing, flossing, and dental visits.  Exercise: the importance of regular exercise/physical activity was discussed. Recommend exercise 3-5 times per week for at least 30 minutes.          History of Present Illness     Adult Annual Physical:  Patient presents for annual physical. Here for well exam.   Currently pregnant- 9 weeks- follows with GYN. .     Diet and Physical Activity:  - Diet/Nutrition: well balanced diet.  - Exercise: walking.    Depression Screening:  - PHQ-2 Score: 0    General Health:  - Sleep: sleeps well.  - Vision: goes for regular eye exams.  - Dental: regular dental visits.    /GYN Health:  - Follows with GYN: yes.   - Menopause: premenopausal.     Review of Systems   Constitutional:  Negative for chills and fever.   Eyes:  Negative for discharge.   Respiratory:  Negative for shortness of breath.    Cardiovascular:  Negative for chest pain.   Gastrointestinal:  Positive for nausea. Negative for constipation and diarrhea.   Genitourinary:  Negative for difficulty urinating.   Musculoskeletal:  Negative for joint swelling.   Skin:  Negative for rash.   Neurological:  Negative for headaches.  "  Hematological:  Negative for adenopathy.   Psychiatric/Behavioral:  The patient is not nervous/anxious.          Objective   /70   Pulse 69   Temp (!) 97.1 °F (36.2 °C) (Temporal)   Resp 16   Ht 5' 3\" (1.6 m)   Wt 84.4 kg (186 lb)   LMP 12/14/2024 (Within Weeks)   SpO2 98%   BMI 32.95 kg/m²     Physical Exam  Vitals and nursing note reviewed.   Constitutional:       General: She is not in acute distress.     Appearance: Normal appearance. She is obese. She is not ill-appearing, toxic-appearing or diaphoretic.   HENT:      Head: Normocephalic and atraumatic.      Right Ear: External ear normal.      Left Ear: External ear normal.      Nose: Nose normal.      Mouth/Throat:      Mouth: Mucous membranes are moist.      Pharynx: Oropharynx is clear. No oropharyngeal exudate or posterior oropharyngeal erythema.   Eyes:      General: Lids are normal. No scleral icterus.        Right eye: No discharge.         Left eye: No discharge.      Extraocular Movements: Extraocular movements intact.      Conjunctiva/sclera: Conjunctivae normal.      Pupils: Pupils are equal, round, and reactive to light.   Cardiovascular:      Rate and Rhythm: Normal rate and regular rhythm. No extrasystoles are present.     Heart sounds: S1 normal and S2 normal. No murmur heard.  Pulmonary:      Effort: Pulmonary effort is normal. No respiratory distress.      Breath sounds: Normal breath sounds. No stridor or decreased air movement. No wheezing or rhonchi.   Abdominal:      General: Bowel sounds are normal.      Palpations: Abdomen is soft.   Musculoskeletal:         General: Normal range of motion.      Cervical back: Normal range of motion and neck supple.   Skin:     General: Skin is warm and dry.   Neurological:      General: No focal deficit present.      Mental Status: She is alert and oriented to person, place, and time. Mental status is at baseline.      Cranial Nerves: No cranial nerve deficit.      Sensory: No sensory " deficit.      Motor: No weakness.      Coordination: Coordination normal.      Gait: Gait normal.   Psychiatric:         Attention and Perception: Attention and perception normal.         Mood and Affect: Mood and affect normal.         Speech: Speech normal.         Behavior: Behavior is cooperative.         Cognition and Memory: Cognition normal.         Judgment: Judgment normal.

## 2025-03-10 ENCOUNTER — RESULTS FOLLOW-UP (OUTPATIENT)
Dept: OBGYN CLINIC | Facility: MEDICAL CENTER | Age: 30
End: 2025-03-10

## 2025-03-10 DIAGNOSIS — R73.09 ABNORMAL GTT (GLUCOSE TOLERANCE TEST): Primary | ICD-10-CM

## 2025-03-12 ENCOUNTER — NURSE TRIAGE (OUTPATIENT)
Age: 30
End: 2025-03-12

## 2025-03-12 NOTE — TELEPHONE ENCOUNTER
"FOLLOW UP: Home care,sent pregnancy essentials guide via [email or web address]    REASON FOR CONVERSATION: Vaginal Discharge    SYMPTOMS: clear/white mucous discharge without itching, burning, irritation, or odor. Notes feels like a lot of discharge. Feels likes she has a cold.     OTHER: Review discharge WNL for pregnancy, use panty liners and change them frequently for dryness/comfort. Reviewed abnormal discharge and what to watch for. Reviewed meds safe in pregnancy.    DISPOSITION: Home Care      Reason for Disposition   Normal vaginal discharge in pregnancy    Answer Assessment - Initial Assessment Questions  1. DISCHARGE: \"Describe the discharge.\" (e.g., white, yellow, green, gray, foamy, cottage cheese-like)      White/clear mucous discharge. Denies itching, burning, odor  2. ODOR: \"Is there a bad odor?\"      denies  3. ONSET: \"When did the discharge begin?\"      3 days  4. RASH: \"Is there a rash in that area?\" If Yes, ask: \"Describe it.\" (e.g., redness, blisters, sores, bumps)      denies  5. ABDOMEN PAIN: \"Are you having any abdomen pain?\" If Yes, ask: \"What does it feel like?\" (e.g., crampy, dull, intermittent, constant)       Stomach pains but not new  6. ABDOMEN PAIN SEVERITY: If present, ask: \"How bad is it?\"  (e.g., Scale 1-10; mild, moderate, or severe)      See above  7. CAUSE: \"What do you think is causing the discharge?\"      unsure  8. OTHER SYMPTOMS: \"Do you have any other symptoms?\" (e.g., fever, itching, vaginal bleeding, pain with urination)      Denies vaginal bleeding, LOF  9. FRANK: \"What date are you expecting to deliver?\"       10/5/25 -   10. PREGNANCY: \"How many weeks pregnant are you?\"        10w3d    Protocols used: Pregnancy - Vaginal Discharge-Adult-[place]    "

## 2025-03-19 ENCOUNTER — PATIENT MESSAGE (OUTPATIENT)
Dept: OBGYN CLINIC | Facility: MEDICAL CENTER | Age: 30
End: 2025-03-19

## 2025-03-19 NOTE — PATIENT COMMUNICATION
OB 11w3d  had large amount of vaginal discharge when wiping this morning. Denies vaginal itching, burning, vaginal odor, vaginal bleeding, abdominal pain. Advised this is likely due to pregnancy hormones. Patient would like reviewed by provider. Routing to on-call provider for review.

## 2025-03-20 PROBLEM — O99.211 OBESITY AFFECTING PREGNANCY IN FIRST TRIMESTER: Status: ACTIVE | Noted: 2025-03-20

## 2025-03-20 PROBLEM — O09.899 SHORT INTERVAL BETWEEN PREGNANCIES AFFECTING PREGNANCY, ANTEPARTUM: Status: ACTIVE | Noted: 2025-03-20

## 2025-03-21 ENCOUNTER — NURSE TRIAGE (OUTPATIENT)
Age: 30
End: 2025-03-21

## 2025-03-21 ENCOUNTER — HOSPITAL ENCOUNTER (EMERGENCY)
Facility: HOSPITAL | Age: 30
Discharge: HOME | End: 2025-03-21
Attending: EMERGENCY MEDICINE
Payer: COMMERCIAL

## 2025-03-21 VITALS
SYSTOLIC BLOOD PRESSURE: 103 MMHG | WEIGHT: 185.85 LBS | DIASTOLIC BLOOD PRESSURE: 64 MMHG | TEMPERATURE: 98.7 F | RESPIRATION RATE: 18 BRPM | OXYGEN SATURATION: 100 % | BODY MASS INDEX: 32.92 KG/M2 | HEART RATE: 73 BPM

## 2025-03-21 DIAGNOSIS — O21.9 NAUSEA AND VOMITING IN PREGNANCY: Primary | ICD-10-CM

## 2025-03-21 LAB
ALBUMIN SERPL BCG-MCNC: 3.9 G/DL (ref 3.5–5)
ALP SERPL-CCNC: 52 U/L (ref 34–104)
ALT SERPL W P-5'-P-CCNC: 11 U/L (ref 7–52)
ANION GAP SERPL CALCULATED.3IONS-SCNC: 5 MMOL/L (ref 4–13)
AST SERPL W P-5'-P-CCNC: 16 U/L (ref 13–39)
BACTERIA UR QL AUTO: ABNORMAL /HPF
BASOPHILS # BLD AUTO: 0.03 THOUSANDS/ÂΜL (ref 0–0.1)
BASOPHILS NFR BLD AUTO: 0 % (ref 0–1)
BILIRUB SERPL-MCNC: 0.75 MG/DL (ref 0.2–1)
BILIRUB UR QL STRIP: NEGATIVE
BUN SERPL-MCNC: 10 MG/DL (ref 5–25)
CALCIUM SERPL-MCNC: 9.1 MG/DL (ref 8.4–10.2)
CHLORIDE SERPL-SCNC: 105 MMOL/L (ref 96–108)
CLARITY UR: CLEAR
CO2 SERPL-SCNC: 23 MMOL/L (ref 21–32)
COLOR UR: YELLOW
CREAT SERPL-MCNC: 0.46 MG/DL (ref 0.6–1.3)
EOSINOPHIL # BLD AUTO: 0.1 THOUSAND/ÂΜL (ref 0–0.61)
EOSINOPHIL NFR BLD AUTO: 1 % (ref 0–6)
ERYTHROCYTE [DISTWIDTH] IN BLOOD BY AUTOMATED COUNT: 12.8 % (ref 11.6–15.1)
GFR SERPL CREATININE-BSD FRML MDRD: 134 ML/MIN/1.73SQ M
GLUCOSE SERPL-MCNC: 80 MG/DL (ref 65–140)
GLUCOSE UR STRIP-MCNC: ABNORMAL MG/DL
HCT VFR BLD AUTO: 36.3 % (ref 34.8–46.1)
HGB BLD-MCNC: 11.8 G/DL (ref 11.5–15.4)
HGB UR QL STRIP.AUTO: NEGATIVE
IMM GRANULOCYTES # BLD AUTO: 0.05 THOUSAND/UL (ref 0–0.2)
IMM GRANULOCYTES NFR BLD AUTO: 1 % (ref 0–2)
KETONES UR STRIP-MCNC: ABNORMAL MG/DL
LEUKOCYTE ESTERASE UR QL STRIP: NEGATIVE
LIPASE SERPL-CCNC: 12 U/L (ref 11–82)
LYMPHOCYTES # BLD AUTO: 2.13 THOUSANDS/ÂΜL (ref 0.6–4.47)
LYMPHOCYTES NFR BLD AUTO: 24 % (ref 14–44)
MCH RBC QN AUTO: 27.3 PG (ref 26.8–34.3)
MCHC RBC AUTO-ENTMCNC: 32.5 G/DL (ref 31.4–37.4)
MCV RBC AUTO: 84 FL (ref 82–98)
MONOCYTES # BLD AUTO: 0.36 THOUSAND/ÂΜL (ref 0.17–1.22)
MONOCYTES NFR BLD AUTO: 4 % (ref 4–12)
MUCOUS THREADS UR QL AUTO: ABNORMAL
NEUTROPHILS # BLD AUTO: 6.39 THOUSANDS/ÂΜL (ref 1.85–7.62)
NEUTS SEG NFR BLD AUTO: 70 % (ref 43–75)
NITRITE UR QL STRIP: POSITIVE
NON-SQ EPI CELLS URNS QL MICRO: ABNORMAL /HPF
NRBC BLD AUTO-RTO: 0 /100 WBCS
PH UR STRIP.AUTO: 6.5 [PH] (ref 4.5–8)
PLATELET # BLD AUTO: 219 THOUSANDS/UL (ref 149–390)
PMV BLD AUTO: 10.3 FL (ref 8.9–12.7)
POTASSIUM SERPL-SCNC: 3.7 MMOL/L (ref 3.5–5.3)
PROT SERPL-MCNC: 6.9 G/DL (ref 6.4–8.4)
PROT UR STRIP-MCNC: NEGATIVE MG/DL
RBC # BLD AUTO: 4.32 MILLION/UL (ref 3.81–5.12)
RBC #/AREA URNS AUTO: ABNORMAL /HPF
SODIUM SERPL-SCNC: 133 MMOL/L (ref 135–147)
SP GR UR STRIP.AUTO: 1.02 (ref 1–1.03)
UROBILINOGEN UR QL STRIP.AUTO: 0.2 E.U./DL
WBC # BLD AUTO: 9.06 THOUSAND/UL (ref 4.31–10.16)
WBC #/AREA URNS AUTO: ABNORMAL /HPF

## 2025-03-21 PROCEDURE — 99284 EMERGENCY DEPT VISIT MOD MDM: CPT

## 2025-03-21 PROCEDURE — 99285 EMERGENCY DEPT VISIT HI MDM: CPT | Performed by: EMERGENCY MEDICINE

## 2025-03-21 PROCEDURE — 96365 THER/PROPH/DIAG IV INF INIT: CPT

## 2025-03-21 PROCEDURE — 80053 COMPREHEN METABOLIC PANEL: CPT | Performed by: EMERGENCY MEDICINE

## 2025-03-21 PROCEDURE — 36415 COLL VENOUS BLD VENIPUNCTURE: CPT | Performed by: EMERGENCY MEDICINE

## 2025-03-21 PROCEDURE — 96375 TX/PRO/DX INJ NEW DRUG ADDON: CPT

## 2025-03-21 PROCEDURE — 87086 URINE CULTURE/COLONY COUNT: CPT

## 2025-03-21 PROCEDURE — 81001 URINALYSIS AUTO W/SCOPE: CPT

## 2025-03-21 PROCEDURE — 96361 HYDRATE IV INFUSION ADD-ON: CPT

## 2025-03-21 PROCEDURE — 85025 COMPLETE CBC W/AUTO DIFF WBC: CPT | Performed by: EMERGENCY MEDICINE

## 2025-03-21 PROCEDURE — 83690 ASSAY OF LIPASE: CPT | Performed by: EMERGENCY MEDICINE

## 2025-03-21 RX ORDER — DEXTROSE MONOHYDRATE AND SODIUM CHLORIDE 5; .9 G/100ML; G/100ML
1000 INJECTION, SOLUTION INTRAVENOUS ONCE
Status: COMPLETED | OUTPATIENT
Start: 2025-03-21 | End: 2025-03-21

## 2025-03-21 RX ORDER — ONDANSETRON 4 MG/1
4 TABLET, ORALLY DISINTEGRATING ORAL EVERY 8 HOURS PRN
Qty: 20 TABLET | Refills: 0 | Status: SHIPPED | OUTPATIENT
Start: 2025-03-21

## 2025-03-21 RX ORDER — ACETAMINOPHEN 10 MG/ML
1000 INJECTION, SOLUTION INTRAVENOUS ONCE
Status: COMPLETED | OUTPATIENT
Start: 2025-03-21 | End: 2025-03-21

## 2025-03-21 RX ORDER — ONDANSETRON 2 MG/ML
4 INJECTION INTRAMUSCULAR; INTRAVENOUS ONCE
Status: COMPLETED | OUTPATIENT
Start: 2025-03-21 | End: 2025-03-21

## 2025-03-21 RX ADMIN — SODIUM CHLORIDE 1000 ML: 0.9 INJECTION, SOLUTION INTRAVENOUS at 16:51

## 2025-03-21 RX ADMIN — ONDANSETRON 4 MG: 2 INJECTION INTRAMUSCULAR; INTRAVENOUS at 16:54

## 2025-03-21 RX ADMIN — ACETAMINOPHEN 1000 MG: 10 INJECTION INTRAVENOUS at 18:34

## 2025-03-21 RX ADMIN — DEXTROSE AND SODIUM CHLORIDE 1000 ML: 5; .9 INJECTION, SOLUTION INTRAVENOUS at 16:56

## 2025-03-21 NOTE — ED PROVIDER NOTES
Time reflects when diagnosis was documented in both MDM as applicable and the Disposition within this note       Time User Action Codes Description Comment    3/21/2025  6:22 PM Giovana Mejia Add [O21.9] Nausea and vomiting in pregnancy           ED Disposition       ED Disposition   Discharge    Condition   Stable    Date/Time   Fri Mar 21, 2025  5:56 PM    Comment   Mala Clint discharge to home/self care.                   Assessment & Plan       Medical Decision Making  30 yo F pregnant with N/V- will treat sx, IVF, CBC to assess for leukocytosis/anemia, CMP to assess for elevated LFTs/PASTORA/electrolyte abn, lipase to r/o pancreatitis, UA to assess for ketonuria/asymptomatic bacteruria, FHR    Sx improved in ER and was able to tolerate po  Discharged with zofran ODT, has OB appointment in 3 days    Close return precautions discussed and pt expressed comfort and understanding with plan     Problems Addressed:  Nausea and vomiting in pregnancy: acute illness or injury    Amount and/or Complexity of Data Reviewed  External Data Reviewed: labs and notes.  Labs: ordered. Decision-making details documented in ED Course.  Discussion of management or test interpretation with external provider(s): OB/GYN    Risk  Prescription drug management.        ED Course as of 03/21/25 2010   Fri Mar 21, 2025   1730 Feeling better, given crackers/apple juice   1740  bpm   1756 Ketones, UA(!): 15 (1+)   1849 Tolerated po. Discussed with OB on call. Has appointment Monday scheduled already. Return precautions discussed       Medications   sodium chloride 0.9 % bolus 1,000 mL (0 mL Intravenous Stopped 3/21/25 1816)   dextrose 5 % and sodium chloride 0.9 % infusion (0 mL Intravenous Stopped 3/21/25 1743)   ondansetron (ZOFRAN) injection 4 mg (4 mg Intravenous Given 3/21/25 1654)   acetaminophen (Ofirmev) injection 1,000 mg (0 mg Intravenous Stopped 3/21/25 1849)       ED Risk Strat Scores                                                 History of Present Illness       Chief Complaint   Patient presents with    Vomiting During Pregnancy     Pt is 11week 5 days pregnant and reports worsening nausea and vomiting over the past three days. Pt reports taking prescribed antinausea medications without relief. Denies lower abdominal pain and vaginal discharge. Pt does report epigastric pain.        Past Medical History:   Diagnosis Date    Anxiety     Asthma     Disease of thyroid gland     Enteritis 12/10/2018    Hypothyroidism     Migraines     Pancytopenia (HCC) 12/12/2018      Past Surgical History:   Procedure Laterality Date    EXTERNAL EAR SURGERY        Family History   Problem Relation Age of Onset    Other Mother         Hysterectomy    Anemia Mother     Migraines Mother     Miscarriages / Stillbirths Mother     Hypertension Father     No Known Problems Sister     Diabetes Maternal Grandmother     Heart disease Maternal Grandmother     Hypertension Maternal Grandmother     Hyperlipidemia Maternal Grandmother     Alzheimer's disease Maternal Grandmother     Alzheimer's disease Maternal Grandfather     Cataracts Maternal Grandfather     Asthma Maternal Grandfather     Thyroid disease Maternal Grandfather     Stroke Paternal Grandmother     Thyroid disease Paternal Grandmother     Cancer Other         possibly uterine    Breast cancer Neg Hx     Colon cancer Neg Hx     Ovarian cancer Neg Hx       Social History     Tobacco Use    Smoking status: Never    Smokeless tobacco: Never   Vaping Use    Vaping status: Never Used   Substance Use Topics    Alcohol use: Not Currently     Comment: social only    Drug use: No      E-Cigarette/Vaping    E-Cigarette Use Never User       E-Cigarette/Vaping Substances    Nicotine No     THC No     CBD No     Flavoring No     Other No     Unknown No       I have reviewed and agree with the history as documented.     30 yo F h/o 11w5d pregnant; presenting with N/V.    Issues throughout pregnancy, but was  improving and now worsened the past 3 days. Nausea/vomiting, inability to tolerate po, urinating less. Slight epigastric/upper abdominal discomfort.    Taking Unisom/B6, previously tried Zofran and Reglan, but has felt dizzy with both- reports less SE with the Zofran.     Denies fevers, chills, diarrhea, vaginal bleeding/discharge, dysuria            Per independent review of external records:   - 2/7/25 US: P    Review of Systems        Objective       ED Triage Vitals   Temperature Pulse Blood Pressure Respirations SpO2 Patient Position - Orthostatic VS   03/21/25 1604 03/21/25 1604 03/21/25 1607 03/21/25 1604 03/21/25 1604 03/21/25 1604   98.7 °F (37.1 °C) 73 103/64 18 100 % Sitting      Temp Source Heart Rate Source BP Location FiO2 (%) Pain Score    03/21/25 1604 03/21/25 1604 03/21/25 1604 -- --    Oral Monitor Right arm        Vitals      Date and Time Temp Pulse SpO2 Resp BP Pain Score FACES Pain Rating User   03/21/25 1607 -- -- -- -- 103/64 -- -- RM   03/21/25 1604 98.7 °F (37.1 °C) 73 100 % 18 -- -- -- RM            Physical Exam  Vitals and nursing note reviewed.   Constitutional:       General: She is not in acute distress.     Appearance: Normal appearance. She is well-developed.   HENT:      Head: Normocephalic and atraumatic.      Nose: Nose normal.   Eyes:      Extraocular Movements: Extraocular movements intact.      Conjunctiva/sclera: Conjunctivae normal.   Cardiovascular:      Rate and Rhythm: Normal rate and regular rhythm.      Heart sounds: Normal heart sounds.   Pulmonary:      Effort: Pulmonary effort is normal. No respiratory distress.      Breath sounds: Normal breath sounds. No stridor. No wheezing or rales.   Chest:      Chest wall: No tenderness.   Abdominal:      General: There is no distension.      Palpations: Abdomen is soft.      Tenderness: There is no abdominal tenderness. There is no guarding or rebound.   Musculoskeletal:         General: No swelling, tenderness or  deformity.      Cervical back: Normal range of motion and neck supple.   Skin:     General: Skin is warm and dry.      Findings: No rash.   Neurological:      General: No focal deficit present.      Mental Status: She is alert and oriented to person, place, and time.      Motor: No abnormal muscle tone.      Coordination: Coordination normal.   Psychiatric:         Thought Content: Thought content normal.         Judgment: Judgment normal.         Results Reviewed       Procedure Component Value Units Date/Time    Urine Microscopic [[id number]]  (Abnormal) Collected: 03/21/25 1744    Lab Status: Final result Specimen: Urine, Clean Catch Updated: 03/21/25 1831     RBC, UA 1-2 /hpf      WBC, UA 1-2 /hpf      Epithelial Cells Occasional /hpf      Bacteria, UA None Seen /hpf      MUCUS THREADS Occasional    Urine culture [[id number]] Collected: 03/21/25 1744    Lab Status: In process Specimen: Urine, Clean Catch Updated: 03/21/25 1750    Urine Macroscopic, POC [[id number]]  (Abnormal) Collected: 03/21/25 1744    Lab Status: Final result Specimen: Urine Updated: 03/21/25 1745     Color, UA Yellow     Clarity, UA Clear     pH, UA 6.5     Leukocytes, UA Negative     Nitrite, UA Positive     Protein, UA Negative mg/dl      Glucose,  (1/2%) mg/dl      Ketones, UA 15 (1+) mg/dl      Urobilinogen, UA 0.2 E.U./dl      Bilirubin, UA Negative     Occult Blood, UA Negative     Specific Gravity, UA 1.025    Narrative:      CLINITEK RESULT    Comprehensive metabolic panel [[id number]]  (Abnormal) Collected: 03/21/25 1651    Lab Status: Final result Specimen: Blood from Hand, Right Updated: 03/21/25 1717     Sodium 133 mmol/L      Potassium 3.7 mmol/L      Chloride 105 mmol/L      CO2 23 mmol/L      ANION GAP 5 mmol/L      BUN 10 mg/dL      Creatinine 0.46 mg/dL      Glucose 80 mg/dL      Calcium 9.1 mg/dL      AST 16 U/L      ALT 11 U/L      Alkaline Phosphatase 52 U/L      Total Protein 6.9 g/dL      Albumin 3.9 g/dL      Total  Bilirubin 0.75 mg/dL      eGFR 134 ml/min/1.73sq m     Narrative:      National Kidney Disease Foundation guidelines for Chronic Kidney Disease (CKD):     Stage 1 with normal or high GFR (GFR > 90 mL/min/1.73 square meters)    Stage 2 Mild CKD (GFR = 60-89 mL/min/1.73 square meters)    Stage 3A Moderate CKD (GFR = 45-59 mL/min/1.73 square meters)    Stage 3B Moderate CKD (GFR = 30-44 mL/min/1.73 square meters)    Stage 4 Severe CKD (GFR = 15-29 mL/min/1.73 square meters)    Stage 5 End Stage CKD (GFR <15 mL/min/1.73 square meters)  Note: GFR calculation is accurate only with a steady state creatinine    Lipase [[id number]]  (Normal) Collected: 03/21/25 1651    Lab Status: Final result Specimen: Blood from Hand, Right Updated: 03/21/25 1717     Lipase 12 u/L     CBC and differential [[id number]] Collected: 03/21/25 1651    Lab Status: Final result Specimen: Blood from Hand, Right Updated: 03/21/25 1655     WBC 9.06 Thousand/uL      RBC 4.32 Million/uL      Hemoglobin 11.8 g/dL      Hematocrit 36.3 %      MCV 84 fL      MCH 27.3 pg      MCHC 32.5 g/dL      RDW 12.8 %      MPV 10.3 fL      Platelets 219 Thousands/uL      nRBC 0 /100 WBCs      Segmented % 70 %      Immature Grans % 1 %      Lymphocytes % 24 %      Monocytes % 4 %      Eosinophils Relative 1 %      Basophils Relative 0 %      Absolute Neutrophils 6.39 Thousands/µL      Absolute Immature Grans 0.05 Thousand/uL      Absolute Lymphocytes 2.13 Thousands/µL      Absolute Monocytes 0.36 Thousand/µL      Eosinophils Absolute 0.10 Thousand/µL      Basophils Absolute 0.03 Thousands/µL             No orders to display       Procedures    ED Medication and Procedure Management   Prior to Admission Medications   Prescriptions Last Dose Informant Patient Reported? Taking?   Prenatal Vit-Fe Fumarate-FA (PRENATAL VITAMIN PO)  Self Yes No   Sig: Take by mouth   acetaminophen (TYLENOL) 650 mg CR tablet   No No   Sig: Take 1 tablet (650 mg total) by mouth every 8 (eight)  hours as needed for mild pain   levothyroxine 25 mcg tablet   No No   Sig: Take 1 tablet (25 mcg total) by mouth daily   magnesium (MAGTAB) 84 MG (7MEQ) TBCR   Yes No   Sig: Take 84 mg by mouth daily   ondansetron (ZOFRAN) 4 mg tablet   No No   Sig: Take 1 tablet (4 mg total) by mouth every 8 (eight) hours as needed for nausea or vomiting   pyridoxine (B-6) 25 MG tablet   No No   Sig: Take 1 tablet (25 mg total) by mouth every 8 (eight) hours      Facility-Administered Medications: None     Discharge Medication List as of 3/21/2025  6:38 PM        START taking these medications    Details   ondansetron (ZOFRAN-ODT) 4 mg disintegrating tablet Take 1 tablet (4 mg total) by mouth every 8 (eight) hours as needed for nausea for up to 20 doses, Starting Fri 3/21/2025, Normal           CONTINUE these medications which have NOT CHANGED    Details   acetaminophen (TYLENOL) 650 mg CR tablet Take 1 tablet (650 mg total) by mouth every 8 (eight) hours as needed for mild pain, Starting Fri 2/7/2025, Normal      levothyroxine 25 mcg tablet Take 1 tablet (25 mcg total) by mouth daily, Starting u 3/6/2025, Normal      magnesium (MAGTAB) 84 MG (7MEQ) TBCR Take 84 mg by mouth daily, Historical Med      ondansetron (ZOFRAN) 4 mg tablet Take 1 tablet (4 mg total) by mouth every 8 (eight) hours as needed for nausea or vomiting, Starting Fri 2/28/2025, Normal      Prenatal Vit-Fe Fumarate-FA (PRENATAL VITAMIN PO) Take by mouth, Historical Med      pyridoxine (B-6) 25 MG tablet Take 1 tablet (25 mg total) by mouth every 8 (eight) hours, Starting Fri 2/7/2025, Normal           No discharge procedures on file.  ED SEPSIS DOCUMENTATION   Time reflects when diagnosis was documented in both MDM as applicable and the Disposition within this note       Time User Action Codes Description Comment    3/21/2025  6:22 PM Giovana Mejia Add [O21.9] Nausea and vomiting in pregnancy                  Giovana Mejia DO  03/21/25 2010

## 2025-03-21 NOTE — TELEPHONE ENCOUNTER
"FOLLOW UP: Routing to on-call provider as kathie.     REASON FOR CONVERSATION: Morning Sickness and Pregnancy Problem    SYMPTOMS: Vomiting 1-2 times per day, feeling dehydrated, lightheaded, dizzy,headache most likely due to dehydration.     OTHER: 11w5d OB patient reports vomiting daily despite trying Zofran, vitamin B6/Unisom. Patient states she is feeling symptomatic dehydration. Advised she leave to ER as soon as possible for eval. Patient verbalized understanding - she works and plans to be there around 1 pm.     DISPOSITION: Go to ED/UCC Now (Or to Office with PCP Approval)  Reason for Disposition   Drinking very little and has signs of dehydration (e.g., no urine > 12 hours, very dry mouth)    Answer Assessment - Initial Assessment Questions  1. SEVERITY - NAUSEA: \"How bad is the nausea?\" (e.g., none; mild, moderate, severe)      Moderate  2. SEVERITY - VOMITING: \"Are you vomiting?\" If Yes, ask: \"How many times have you vomited in the past 24 hours?\" (e.g., nausea only; mild, moderate or severe vomiting)      1-2 a day  3. ONSET: \"When did the nausea or vomiting begin?\"       Worsened 4 days ago  4. FLUIDS: \"What fluids or food have you vomited up today?\" \"Are you able to keep any liquids down?\"     Can tolerate ginger ale. Ice water a little manageable. Small snacking, crackers.   5. TREATMENT: \"What have you been doing so far to treat this?\"       Zofran, vitamin B6 and unisom.   6. DEHYDRATION: \"When was the last time you urinated?\" \"Are you feeling lightheaded?\" \"Weight loss?\"      Lightheadedness, very dizzy at times. Feeling thirsty but when drinks water, feels like needs to vomit. Urine very yellow  7. PREGNANCY: \"How many weeks pregnant are you?\" \"How has the pregnancy been going?\"      11w5d  8. FRANK: \"What date are you expecting to deliver?\"      10/5/25  9. MEDICINES: \"What medicines are you taking?\" (e.g., iron, opioid pain medicines, prenatal vitamins, vitamin B6)      Zofran, B6 and unisom  10. " "OTHER SYMPTOMS: \"Do you have any other symptoms?\" (e.g., diarrhea, fever)        Headache due to dehydration. Unable to keep food down.    Protocols used: Pregnancy - Morning Sickness (Nausea and Vomiting of Pregnancy)-Adult-OH    "

## 2025-03-21 NOTE — DISCHARGE INSTRUCTIONS
Continue B6/Unisom, Zofran as needed  Keep well hydrated- frequent hydration and small frequent meals    Follow up with OB as previously scheduled    Return to the ER if new/worsening symptoms including but not limited to intractable vomiting, dehydration, lightheadedness, passing out, etc.

## 2025-03-22 LAB — BACTERIA UR CULT: NORMAL

## 2025-03-24 ENCOUNTER — INITIAL PRENATAL (OUTPATIENT)
Dept: OBGYN CLINIC | Facility: MEDICAL CENTER | Age: 30
End: 2025-03-24
Payer: COMMERCIAL

## 2025-03-24 VITALS
HEIGHT: 63 IN | WEIGHT: 189.6 LBS | DIASTOLIC BLOOD PRESSURE: 60 MMHG | BODY MASS INDEX: 33.59 KG/M2 | SYSTOLIC BLOOD PRESSURE: 102 MMHG

## 2025-03-24 DIAGNOSIS — Z34.81 PRENATAL CARE, SUBSEQUENT PREGNANCY, FIRST TRIMESTER: Primary | ICD-10-CM

## 2025-03-24 PROCEDURE — 99211 OFF/OP EST MAY X REQ PHY/QHP: CPT

## 2025-03-24 NOTE — PROGRESS NOTES
OB INTAKE INTERVIEW 2025    Patient is 29 y.o. who presents for OB intake at 12w1d.  She unaccompanied during this encounter.  The father of her baby (Jus) is involved in the pregnancy.      Patient's last menstrual period was 2024 (within weeks).  Ultrasound: Measured 5 weeks 5 days on 25  Estimated Date of Delivery: 10/5/25 changed by dating ultrasound.    Signs/Symptoms of Pregnancy  Current pregnancy symptoms: nausea  Constipation yes  Headaches yes  Cramping/spotting no  PICA cravings no    Diabetes  Body mass index is 33.59 kg/m².  If patient has 1 or more, please order early 1 hour GTT  History of GDM no  BMI >35 no  History of PCOS or current metformin use no  History of LGA/macrosomic infant (4000g/9lbs) no    If patient has 2 or more, please order early 1 hour GTT  BMI>30 yes  AMA no  First degree relative with type 2 diabetes no  History of chronic HTN, hyperlipidemia, elevated A1C no  High risk race (, , ,  or ) yes    Hypertension  History of of chronic HTN no  History of gestational HTN no  History of preeclampsia, eclampsia, or HELLP syndrome no  History of diabetes no  History of lupus, sjogrens syndrome, kidney disease no    Thyroid  History of thyroid disease yes    Bleeding Disorder or Hx of DVT: (Factor V, antithrombin III, prothrombin gene mutation, protein C and S Ag, lupus anticoagulant, anticardiolipin, beta-2 glycoprotein): no    OB/GYN:  History of abnormal pap smear no       Date of last pap smear 2023  History of HPV no  History of Herpes/HSV no  History of other STI (gonorrhea, chlamydia, trich) no  History of prior  yes  History of prior  no  History of  delivery prior to 36 weeks 6 days no  History of blood transfusion no  Ok for blood transfusion yes    Substance screening:  History of tobacco use no  Currently using tobacco no  Substance Use Screen Level - no risk    MRSA  Screening:   Does the pt have a hx of MRSA? no    Immunizations:  History of Varicella or Vaccination: Varicella non-immune. Did not receive immunization postpartum.   Influenza vaccine given this season - received in November  Discussed Tdap vaccine YES   COVID Vaccine x3    Genetic/[facility]:  Do you or your partner have a history of any of the following in yourselves or first degree relatives?  Cystic fibrosis no  Spinal muscular atrophy no  Hemoglobinopathy/Sickle Cell/Thalassemia no  Fragile X Intellectual Disability no    Patient does not desire testing for Cystic Fibrosis and Spinal Muscular Atrophy.     Appointment for Nuchal Translucency Ultrasound at [facility] is scheduled for 3/25.    Interview education  [facility] Pregnancy Essentials Book reviewed, discussed and attached to their AVS YES     Prenatal lab work scripts completed - needs to complete early 1 hour GTT. Encouraged to complete.    Aspirin/Preeclampsia Screen    Risk Level Risk Factor Recommendation   LOW Prior Uncomplicated full-term delivery no No Aspirin recommendation        MODERATE Nulliparity no Recommend low-dose aspirin if     BMI>30 yes 2 or more moderate risk factors    Family History Preeclampsia (mother/sister) no     35yr old or greater no     Black Race, Concern for SDOH/Low Socioeconomic no     IVF Pregnancy  no     Personal History Risks (low birth weight, prior adverse preg outcome, >10yr preg interval) no         HIGH History of Preeclampsia no Recommend low-dose aspirin if     Multifetal gestation no 1 or more high risk factors    Chronic HTN no     Type 1 or 2 Diabetes no     Renal Disease no     Autoimmune Disease  no      Contraindications to ASA therapy:  NSAID/ ASA allergy: no  Nasal polyps: no  Asthma with history of ASA induced bronchospasm: no  Relative contraindications:  History of GI bleed: no  Active peptic ulcer disease: no  Severe hepatic dysfunction: no    Patient does not meet recommendation to take ASA 162mg during  this pregnancy from 12-36wks to lower her risk of preeclampsia.     The patient has a history now or in prior pregnancy notable for: none    Details that I feel the provider should be aware of: Mala came in for her OB intake at 12w1d. Patient c/o nausea. Taking Vit B6 with Unisom and Zofran at nighttime.  Was recently in the ED due to N/V. Reports has not vomited since Friday and feels better. Safe medications to take during pregnancy and warning signs reviewed. Patient with h/o  in . Prenatal panel completed. Encouraged to complete early 1 hour glucose when able to tolerate. It has not been completed due to patient struggling with N/V. Patient has NT scheduled with MFM on 3/25. Desires NIPT.    PN1 visit scheduled. The patient was oriented to our practice, the navigator role, reviewed delivering physicians and [facility] for delivery. All questions were answered.    Interviewed by: Donna GARCIA RN

## 2025-03-25 ENCOUNTER — ROUTINE PRENATAL (OUTPATIENT)
Age: 30
End: 2025-03-25
Payer: COMMERCIAL

## 2025-03-25 VITALS
DIASTOLIC BLOOD PRESSURE: 66 MMHG | WEIGHT: 188.8 LBS | HEIGHT: 63 IN | HEART RATE: 80 BPM | SYSTOLIC BLOOD PRESSURE: 112 MMHG | BODY MASS INDEX: 33.45 KG/M2

## 2025-03-25 DIAGNOSIS — E07.9 THYROID DISEASE AFFECTING PREGNANCY: ICD-10-CM

## 2025-03-25 DIAGNOSIS — Z32.01 PREGNANCY EXAMINATION OR TEST, POSITIVE RESULT: ICD-10-CM

## 2025-03-25 DIAGNOSIS — O99.280 THYROID DISEASE AFFECTING PREGNANCY: ICD-10-CM

## 2025-03-25 DIAGNOSIS — Z36.82 ENCOUNTER FOR (NT) NUCHAL TRANSLUCENCY SCAN: ICD-10-CM

## 2025-03-25 DIAGNOSIS — O99.211 OBESITY AFFECTING PREGNANCY IN FIRST TRIMESTER, UNSPECIFIED OBESITY TYPE: ICD-10-CM

## 2025-03-25 DIAGNOSIS — O09.899 SHORT INTERVAL BETWEEN PREGNANCIES AFFECTING PREGNANCY, ANTEPARTUM: Primary | ICD-10-CM

## 2025-03-25 DIAGNOSIS — Z3A.12 12 WEEKS GESTATION OF PREGNANCY: ICD-10-CM

## 2025-03-25 PROBLEM — O44.02 PLACENTA PREVIA ANTEPARTUM IN SECOND TRIMESTER: Status: RESOLVED | Noted: 2025-03-25 | Resolved: 2025-03-25

## 2025-03-25 PROBLEM — O44.02 PLACENTA PREVIA ANTEPARTUM IN SECOND TRIMESTER: Status: ACTIVE | Noted: 2025-03-25

## 2025-03-25 PROCEDURE — 99243 OFF/OP CNSLTJ NEW/EST LOW 30: CPT | Performed by: OBSTETRICS & GYNECOLOGY

## 2025-03-25 PROCEDURE — 36415 COLL VENOUS BLD VENIPUNCTURE: CPT | Performed by: OBSTETRICS & GYNECOLOGY

## 2025-03-25 PROCEDURE — 76801 OB US < 14 WKS SINGLE FETUS: CPT | Performed by: OBSTETRICS & GYNECOLOGY

## 2025-03-25 PROCEDURE — 76813 OB US NUCHAL MEAS 1 GEST: CPT | Performed by: OBSTETRICS & GYNECOLOGY

## 2025-03-25 NOTE — LETTER
"2025    Simeon Rojas MD  [address] [place] [address]    Patient: Mala Jaramillo   YOB: 1995   Date of Visit: 3/25/2025   Gestational age 12w2d   Nature of this communication: Routine       Dear Dr Rojas,    This patient was seen recently in our  office.  The content of my evaluation today is in the ultrasound report under \"OB Procedures\" tab.     Please don't hesitate to contact our office with any concerns or questions.     Sincerely,      Barby Lazaro MD  Attending Physician, Maternal-Fetal Medicine  [facility]      "

## 2025-03-25 NOTE — PROGRESS NOTES
Patient chose to have [facility] [id number] Non-Invasive Prenatal Screen [id number] PLUS w/ SCA, WITH fetal sex.  Patient choose to be billed through insurance.     Patient given brochure and is aware [facility] will contact patient's insurance and coordinate coverage.  Provided [facility] contact information. General inquiries [phone], Cost estimates [phone] and [facility] Billing [phone]. Website [email or web address].     Blood collection tubes labeled with patient identifiers (name, medical record number, and date of birth).     Filled out [facility] order form. Patient chose to have blood drawn in our office at time of visit. NIPS was drawn from right arm with a butterfly needle by Michelle Siu. .      If patient chose to have blood work drawn at a [facility] lab we requested patient notify MFM (via phone call or [employer] message) when blood collected so office can follow up on results.       Maternal Fetal Medicine will have results in approximately 5-7 business days and will call patient or notify via [employer].  Patient aware viewing lab result online will reveal fetal sex if ordered.    Patient verbalized understanding of all instructions and no questions at this time.

## 2025-03-25 NOTE — PROGRESS NOTES
"[facility]: Mala Jaramillo was seen today for nuchal translucency ultrasound.  See ultrasound report under \"OB Procedures\" tab.   Please don't hesitate to contact our office with any concerns or questions.  -Barby Lazaro MD    "

## 2025-03-30 ENCOUNTER — RESULTS FOLLOW-UP (OUTPATIENT)
Dept: LABOR AND DELIVERY | Facility: HOSPITAL | Age: 30
End: 2025-03-30

## 2025-03-30 LAB
CFDNA.FET/CFDNA.TOTAL SFR FETUS: NORMAL %
CFDNA.FET/CFDNA.TOTAL SFR FETUS: NORMAL %
CITATION REF LAB TEST: NORMAL
FET 13+18+21+X+Y ANEUP PLAS.CFDNA: NEGATIVE
FET CHR 21 TS PLAS.CFDNA QL: NEGATIVE
FET CHR 21 TS PLAS.CFDNA QL: NEGATIVE
FET MS X RISK WBC.DNA+CFDNA QL: NOT DETECTED
FET SEX PLAS.CFDNA DOSAGE CFDNA: NORMAL
FET TS 13 RISK PLAS.CFDNA QL: NEGATIVE
FET X + Y ANEUP RISK PLAS.CFDNA SEQ-IMP: NOT DETECTED
GA EST FROM CONCEPTION DATE: NORMAL D
GESTATIONAL AGE > 9:: YES
LAB DIRECTOR NAME PROVIDER: NORMAL
LABORATORY COMMENT REPORT: NORMAL
LIMITATIONS OF THE TEST: NORMAL
NEGATIVE PREDICTIVE VALUE: NORMAL
PERFORMANCE CHARACTERISTICS: NORMAL
POSITIVE PREDICTIVE VALUE: NORMAL
REF LAB TEST METHOD: NORMAL
SL AMB NOTE:: NORMAL
TEST PERFORMANCE INFO SPEC: NORMAL

## 2025-03-30 NOTE — RESULT ENCOUNTER NOTE
Ms. Mala Jaramillo   Your Cell free DNA screening returned as normal.  If you are interested in knowing what the baby's sex is, than you will need to open the lab result to see it.     Your obstetrician at your next OB visit should offer screening for spina bifida that can be completed between 16 and 18 weeks and utilizes the blood test called MSAFP. This lab is to see if your baby is at increased risk to have spinal defect.    Heather Lemos MD

## 2025-04-04 NOTE — PROGRESS NOTES
Name: Mala Jaramillo      : 1995      MRN: [id number]  Encounter Provider: LIZ Clement  Encounter Date: 2025   Encounter department: [facility]  :  Assessment & Plan  14 weeks gestation of pregnancy  - Continue prenatal vitamin daily  - PNC follow up 25  -  Weight gain in pregnancy- Who BMI recommend   - Unit regimen reviewed visit every 4 weeks until 28 weeks, every 2 weeks until 36 weeks and then weekly until delivery.  Aware office delivers at [facility]. Reviewed depending on complaint and gestational age may recommend evaluation at [facility]  - Vaccines in Pregnancy      - TDAP vaccine after 27 gestational weeks     - RSV vaccine between 32-36.6 gestational weeks. September- January      - Reviewed with patient  Pregnancy with COVID infection is considered  high risk and can have poor outcome including  delivery, more severe infection.  therefore  pregnant patients are recommended to get  COVID-19 vaccination. Written information provided     - influenza October- March NA  -  Common discomforts of pregnancy and precautions reviewed.  Signs and symptoms to report reviewed.    Orders:    Chlamydia/GC amplified DNA by PCR    Alpha fetoprotein, maternal; Future    Thyroid disease affecting pregnancy  Continue levothyroxine 25 mcg daily  Last TSH 0.420 free T4 0.84 3/2/25  Plan to repeat with 28-week labs         Short interval between pregnancies affecting pregnancy, antepartum   2/10/2024         Obesity affecting pregnancy in first trimester, unspecified obesity type  Pregravid BMI 33.31         Maternal varicella, non-immune  Offer Varivax postpartum         Screening examination for STI    Orders:    Chlamydia/GC amplified DNA by PCR    Encounter for  screening for high alpha-fetoprotein level  Reviewed recommendation for MSAFP.  Patient verbalized understanding is a time sensitive test to be drawn between 16-21.6 gestational  weeks  Orders:    Alpha fetoprotein, maternal; Future    Nausea and vomiting in pregnancy    Orders:    ondansetron (ZOFRAN-ODT) 4 mg disintegrating tablet; Take 1 tablet (4 mg total) by mouth every 8 (eight) hours as needed for nausea for up to 30 doses    RTO 4 weeks    History of Present Illness   HPI  Mala Jaramillo is a 29 y.o. female who presents Prenatal H&P     Denies loss of fluid, vaginal discharge, vaginal bleeding  and abdominal pain. Not feeling fetal movement. Tolerating PNV well.   Prenatal panel reviewed - WNL.  Completed early ultrasound and MaterniT 21 for genetic screening. Planned  pregnancy.  Continues to have daily nausea.  Vomiting has decreased.  Is requesting refill of Zofran.      History obtained from: patient    Review of Systems   Constitutional:  Negative for chills and fever.   Respiratory: Negative.     Cardiovascular: Negative.    Genitourinary: Negative.      Medical History Reviewed by provider this encounter:  Tobacco  Allergies  Meds  Problems  Med Hx  Surg Hx  Fam Hx  Soc   Hx    .     Objective   /64   Wt 85.6 kg (188 lb 12.8 oz)   LMP 2024 (Within Weeks)   BMI 33.44 kg/m²        OB history:     G1- 2/10/24 Term  male 8lb 3.9 oz; denies complications     G2- current     Dating:     LMP - 24 FRANK 25     US on 25 @  5w 5d FRANK 10/5/25  Working FRANK 10/5/25     Surgical history:       Past Surgical History:   Procedure Laterality Date    EXTERNAL EAR SURGERY         Medical History:  Past Medical History:   Diagnosis Date    Anxiety     Asthma     Hypothyroidism     Migraines     Pancytopenia (HCC) 2018       Social history:     Alcohol/ tobacco/ illicit drug rare alcohol prior to pregnancy/denies drug or tobacco use     Denies history of STD/STI     Work/ housing/ support  [employer] dept - from home/ / apartment - stable/ FOB, family and friends supportive     PCP/ Dental last PE 3/2025 / last cleaning scheduled 2025     EVER no risk       Patient Plans   - Feeding breast   - Contraception NFP

## 2025-04-04 NOTE — ASSESSMENT & PLAN NOTE
Continue levothyroxine 25 mcg daily  Last TSH 0.420 free T4 0.84 3/2/25  Plan to repeat with 28-week labs

## 2025-04-04 NOTE — PATIENT INSTRUCTIONS
Medications and Pregnancy  The following list of over-the-counter medications is usually considered safe to take during pregnancy. Take care to not double up on products containing acetaminophen (Tylenol).   Colds/Sore Throat  Robitussin DM - Plain (guaifenesin)  Saline nasal spray  Warm salt water gargle  Cepacol throat lozenges or mouthwash (cetylpyridinium)  Sucrets (hexylresoricinol)  Allergy  AVOID the “D” - or DECONGESTANT  Claritin (loratadine)  Zrytec (cetirizine)  Allerga (fexofenadine)  Headaches / Aches and Pains  Tylenol (acetaminophen)  Do NOT exceed more than 3000 mg of Tylenol in a 24-hour period.  Heartburn  Mylanta (aluminum hydroxide/simethicone, magnesium hydroxide)  Maalaox (aluminum magnesium hydroxide, magnesium hydroxide)  Tums (calcium carbonate)  Riopan (magaldrate)  Constipation  MiraLAX  Glycerin suppositories  Fleets enema (sodium phosphate & sodium biphosphate)  Nausea/Vomiting  Vitamin B6 (pyridoxine) - May take 50 mg at bedtime, 25 mg in the morning, 25 mg in the afternoon  Unisom (doxylamine) - May use for nausea/vomiting - (cut a 25 mg tablet in half). May cause drowsiness.   Sleep  Benadryl (diphenhydramine) - Take 1-2 tablets as needed at bedtime  Unisom (doxylamine) 25 mg tablet - As needed at bedtime  Melatonin 5 mg tablet - As needed at bedtime    Generally the generic form of medicine is usually lower priced than the brand name form of the medicine.   Talk to your healthcare provider before you take any medicines.  Many medicines may harm your baby if you take them when you are pregnant. Do not take any medicines, vitamins, herbs, or supplements without first talking to your healthcare provider.

## 2025-04-07 ENCOUNTER — INITIAL PRENATAL (OUTPATIENT)
Dept: OBGYN CLINIC | Facility: CLINIC | Age: 30
End: 2025-04-07
Payer: COMMERCIAL

## 2025-04-07 VITALS — BODY MASS INDEX: 33.44 KG/M2 | DIASTOLIC BLOOD PRESSURE: 64 MMHG | SYSTOLIC BLOOD PRESSURE: 100 MMHG | WEIGHT: 188.8 LBS

## 2025-04-07 DIAGNOSIS — O09.899 SHORT INTERVAL BETWEEN PREGNANCIES AFFECTING PREGNANCY, ANTEPARTUM: ICD-10-CM

## 2025-04-07 DIAGNOSIS — Z36.1 ENCOUNTER FOR ANTENATAL SCREENING FOR HIGH ALPHA-FETOPROTEIN LEVEL: ICD-10-CM

## 2025-04-07 DIAGNOSIS — O99.280 THYROID DISEASE AFFECTING PREGNANCY: Primary | ICD-10-CM

## 2025-04-07 DIAGNOSIS — E07.9 THYROID DISEASE AFFECTING PREGNANCY: Primary | ICD-10-CM

## 2025-04-07 DIAGNOSIS — O21.9 NAUSEA AND VOMITING IN PREGNANCY: ICD-10-CM

## 2025-04-07 DIAGNOSIS — O09.899 MATERNAL VARICELLA, NON-IMMUNE: ICD-10-CM

## 2025-04-07 DIAGNOSIS — O99.211 OBESITY AFFECTING PREGNANCY IN FIRST TRIMESTER, UNSPECIFIED OBESITY TYPE: ICD-10-CM

## 2025-04-07 DIAGNOSIS — Z3A.14 14 WEEKS GESTATION OF PREGNANCY: ICD-10-CM

## 2025-04-07 DIAGNOSIS — Z11.3 SCREENING EXAMINATION FOR STI: ICD-10-CM

## 2025-04-07 DIAGNOSIS — Z28.39 MATERNAL VARICELLA, NON-IMMUNE: ICD-10-CM

## 2025-04-07 PROCEDURE — 99214 OFFICE O/P EST MOD 30 MIN: CPT | Performed by: NURSE PRACTITIONER

## 2025-04-07 PROCEDURE — 87591 N.GONORRHOEAE DNA AMP PROB: CPT | Performed by: NURSE PRACTITIONER

## 2025-04-07 PROCEDURE — 87491 CHLMYD TRACH DNA AMP PROBE: CPT | Performed by: NURSE PRACTITIONER

## 2025-04-07 RX ORDER — ONDANSETRON 4 MG/1
4 TABLET, ORALLY DISINTEGRATING ORAL EVERY 8 HOURS PRN
Qty: 30 TABLET | Refills: 0 | Status: SHIPPED | OUTPATIENT
Start: 2025-04-07

## 2025-04-08 ENCOUNTER — RESULTS FOLLOW-UP (OUTPATIENT)
Dept: OBGYN CLINIC | Facility: CLINIC | Age: 30
End: 2025-04-08

## 2025-04-08 LAB
C TRACH DNA SPEC QL NAA+PROBE: NEGATIVE
N GONORRHOEA DNA SPEC QL NAA+PROBE: NEGATIVE

## 2025-04-16 ENCOUNTER — NURSE TRIAGE (OUTPATIENT)
Age: 30
End: 2025-04-16

## 2025-04-16 NOTE — TELEPHONE ENCOUNTER
"FOLLOW UP: RN will discuss with provider, and staff will return a call.     REASON FOR CONVERSATION: Pelvic Pain - Pregnant    SYMPTOMS: Sudden sharp pain on right side that started at 10:30am this morning. Comes and goes regardless of activity. Rates at 4/10.    OTHER: Pt is 15w3d, , calling with concerns for sudden onset of right sided pelvic pain that started at 10:30am this morning. States was sudden and sharp, and has been coming and going since. Rates at 4/10. Has experienced such pain in the past on left side, in ovary area. This was prior to pregnancy but lasted intermittently throughout early pregnancy until about mid-February this year. Then it went away. Denies vaginal bleeding, abnormal vaginal discharge, period-like cramping or leaking of fluid. Pt asking for recommendations. RN advised this could be round ligament pain due to patient's pregnancy plus her being home with young child and caring for young child and being active, however, will discuss with provider for additional recommendations. Staff will return a call with update. Pt agreeable to plan. No further questions.     DISPOSITION: Discuss with Provider and Call Back Patient      Answer Assessment - Initial Assessment Questions  1. LOCATION: \"Where does it hurt?\"       Right side, sharp, comes and goes  2. RADIATION: \"Does the pain shoot anywhere else?\" (e.g., chest, back, shoulder)      Denies  3. ONSET: \"When did the pain begin?\" (e.g., minutes, hours or days ago)       This morning around 10:30am (was sitting down)  4. ONSET: \"Gradual or sudden onset?\"      Sudden, sharp pain  5. PATTERN \"Does the pain come and go, or has it been constant since it started?\"       Comes and goes now - regardless of activity  6. SEVERITY: \"How bad is the pain?\" \"What does it keep you from doing?\"  (e.g., Scale 1-10; mild, moderate, or severe)      4/10  7. RECURRENT SYMPTOM: \"Have you ever had this type of stomach pain before?\" If Yes, ask: \"When was the " "last time?\" and \"What happened that time?\"       Had similar pain on other side; checked and was OK - was prior to pregnancy and during. Then went away  8. CAUSE: \"What do you think is causing the stomach pain?\"      Unsure  9. RELIEVING/AGGRAVATING FACTORS: \"What makes it better or worse?\" (e.g., antacids, bowel movement, movement)      Denies  10. OTHER SYMPTOMS: \"Do you have any other symptoms?\" (e.g., back pain, diarrhea, fever, urination pain, vaginal bleeding, vaginal discharge, vomiting)        Denies  11. FRANK: \"What date are you expecting to deliver?\"        10/5/25    Protocols used: Pregnancy - Abdominal Pain Less Than 20 Weeks EGA-Adult-OH    "

## 2025-04-28 ENCOUNTER — HOSPITAL ENCOUNTER (OUTPATIENT)
Facility: HOSPITAL | Age: 30
Discharge: HOME | End: 2025-04-28
Attending: OBSTETRICS & GYNECOLOGY | Admitting: OBSTETRICS & GYNECOLOGY
Payer: COMMERCIAL

## 2025-04-28 VITALS
HEART RATE: 78 BPM | OXYGEN SATURATION: 100 % | RESPIRATION RATE: 16 BRPM | DIASTOLIC BLOOD PRESSURE: 63 MMHG | TEMPERATURE: 98.5 F | SYSTOLIC BLOOD PRESSURE: 118 MMHG

## 2025-04-28 PROBLEM — O26.891 ABDOMINAL PAIN DURING PREGNANCY IN FIRST TRIMESTER: Status: ACTIVE | Noted: 2024-02-08

## 2025-04-28 LAB
BILIRUB UR QL STRIP: NEGATIVE
CLARITY UR: CLEAR
COLOR UR: ABNORMAL
GLUCOSE UR STRIP-MCNC: ABNORMAL MG/DL
HGB UR QL STRIP.AUTO: NEGATIVE
KETONES UR STRIP-MCNC: NEGATIVE MG/DL
LEUKOCYTE ESTERASE UR QL STRIP: NEGATIVE
NITRITE UR QL STRIP: NEGATIVE
PH UR STRIP.AUTO: 6 [PH]
PROT UR STRIP-MCNC: NEGATIVE MG/DL
SP GR UR STRIP.AUTO: 1.03 (ref 1–1.03)
UROBILINOGEN UR STRIP-ACNC: <2 MG/DL

## 2025-04-28 PROCEDURE — 99203 OFFICE O/P NEW LOW 30 MIN: CPT

## 2025-04-28 PROCEDURE — 76817 TRANSVAGINAL US OBSTETRIC: CPT

## 2025-04-28 PROCEDURE — NC001 PR NO CHARGE: Performed by: OBSTETRICS & GYNECOLOGY

## 2025-04-28 NOTE — PROCEDURES
Mala Jaramillo, a  at 17w1d with an FRANK of 10/5/2025, by Ultrasound, was seen at [facility] for the following procedure(s): $Procedure Type: US - Transvaginal]                   Ultrasound Other  Cervical Length: 4.7  Funnel: No  Debris: No  Placenta Previa: No  Vasa Previa: No               Ultrasound Probe Disinfection    A transvaginal ultrasound was performed.   Prior to use, disinfection was performed with High Level Disinfection Process (Trophon)  Probe serial number SLAC-1 :  [id number] was used    Stella Mak MD  25  2:55 PM

## 2025-04-28 NOTE — PROGRESS NOTES
Triage Note - OB  Mala Jaramillo 29 y.o. female MRN: [id number]  Unit/Bed#:  TRIAGE 3- Encounter: [id number]    OB TRIAGE NOTE  Mala Jaramillo  [id number]  2025  2:39 PM  LD TRIAGE 3/LD TRIAGE 3-*    ASSESS:  29 y.o.  17w1d with lower pelvic cramping, in the distribution of round ligament pain. UA +glucose    PLAN  #1. R/o PTL  CL: 4.7 cm  SVE: 0/0/-4  KOH/NS neg  UA +glucose, otherwise wnl    #2. Round ligament pain  Most likely due to physical strain from moving  Reviewed supportive measures that she can use at home to improve pain including increased water intake, belly band to relieve pelvic pressure and weight off back, heating pad on back, warm baths, Lidocaine patches and Tylenol PRN.   Reviewed return precautions including increasing abdominal cramping/pain, contractions, leakage of fluid, vaginal bleeding. She understands and is stable for discharge.    #3. Discharge instructions  Patient instructed to call if experiencing worsening contractions, vaginal bleeding, loss of fluid or decreased fetal movement.  Will follow up with OBGYN on 25.    D/w Dr. Canales  ______________    SUBJECTIVE    FRANK: Estimated Date of Delivery: 10/5/25    HPI Chronology:  29 y.o.  17w1d presents with complaint of lower abdominal cramping since yesterday. She is pointing in two specific areas bilaterally in her pelvis She reports she is the process of moving and has been packing, moving and lifting heavy items in the past couple days. She reports she has tried laying down and taking a shower with some relief of the pain, but has not tried anything else.    Contractions: no  Leakage: no  Bleeding: no  Fetal Movement: yes  Pelvic pain: yes    Vitals:   /63   Pulse 78   Temp 98.5 °F (36.9 °C)   Resp 16   LMP 2024 (Within Weeks)   SpO2 100%   There is no height or weight on file to calculate BMI.    Review of Systems   Constitutional:  Negative for chills and fever.   Eyes:  Negative  for visual disturbance.   Respiratory:  Negative for chest tightness and shortness of breath.    Cardiovascular:  Negative for chest pain.   Gastrointestinal:  Positive for abdominal pain.   Genitourinary:  Negative for flank pain, vaginal bleeding, vaginal discharge and vaginal pain.   Musculoskeletal:  Negative for back pain.   Neurological:  Negative for headaches.       Physical Exam  HENT:      Head: Normocephalic.   Eyes:      Conjunctiva/sclera: Conjunctivae normal.   Cardiovascular:      Rate and Rhythm: Normal rate.      Pulses: Normal pulses.   Pulmonary:      Effort: Pulmonary effort is normal.   Abdominal:      Palpations: Abdomen is soft.      Tenderness: There is no abdominal tenderness.   Genitourinary:     General: Normal vulva.      Comments: Speculum: normal vaginal and cervical mucosa, normal physiologic discharge  Skin:     General: Skin is warm.   Neurological:      Mental Status: She is alert and oriented to person, place, and time.   Psychiatric:         Mood and Affect: Mood normal.         FHT:  144 on TAUS  TOCO:   Acontractile    Labs: No results found for this or any previous visit (from the past 24 hours).    Lab, Imaging and other studies: I have personally reviewed pertinent reports.        Stella Mak MD  4/28/2025  2:39 PM

## 2025-05-05 ENCOUNTER — APPOINTMENT (OUTPATIENT)
Dept: LAB | Facility: MEDICAL CENTER | Age: 30
End: 2025-05-05
Payer: COMMERCIAL

## 2025-05-05 ENCOUNTER — ROUTINE PRENATAL (OUTPATIENT)
Dept: OBGYN CLINIC | Facility: MEDICAL CENTER | Age: 30
End: 2025-05-05
Payer: COMMERCIAL

## 2025-05-05 ENCOUNTER — APPOINTMENT (OUTPATIENT)
Dept: LAB | Facility: MEDICAL CENTER | Age: 30
End: 2025-05-05
Attending: NURSE PRACTITIONER
Payer: COMMERCIAL

## 2025-05-05 VITALS — BODY MASS INDEX: 33.66 KG/M2 | SYSTOLIC BLOOD PRESSURE: 108 MMHG | DIASTOLIC BLOOD PRESSURE: 62 MMHG | WEIGHT: 190 LBS

## 2025-05-05 DIAGNOSIS — O21.9 NAUSEA/VOMITING IN PREGNANCY: ICD-10-CM

## 2025-05-05 DIAGNOSIS — Z3A.14 14 WEEKS GESTATION OF PREGNANCY: ICD-10-CM

## 2025-05-05 DIAGNOSIS — O99.280 THYROID DISEASE AFFECTING PREGNANCY: ICD-10-CM

## 2025-05-05 DIAGNOSIS — O09.899 SHORT INTERVAL BETWEEN PREGNANCIES AFFECTING PREGNANCY, ANTEPARTUM: ICD-10-CM

## 2025-05-05 DIAGNOSIS — Z32.01 PREGNANCY EXAMINATION OR TEST, POSITIVE RESULT: ICD-10-CM

## 2025-05-05 DIAGNOSIS — E07.9 THYROID DISEASE AFFECTING PREGNANCY: ICD-10-CM

## 2025-05-05 DIAGNOSIS — Z36.1 ENCOUNTER FOR ANTENATAL SCREENING FOR HIGH ALPHA-FETOPROTEIN LEVEL: ICD-10-CM

## 2025-05-05 DIAGNOSIS — Z3A.18 18 WEEKS GESTATION OF PREGNANCY: Primary | ICD-10-CM

## 2025-05-05 LAB — GLUCOSE 1H P 50 G GLC PO SERPL-MCNC: 140 MG/DL (ref 70–134)

## 2025-05-05 PROCEDURE — 36415 COLL VENOUS BLD VENIPUNCTURE: CPT

## 2025-05-05 PROCEDURE — 82950 GLUCOSE TEST: CPT

## 2025-05-05 PROCEDURE — 99213 OFFICE O/P EST LOW 20 MIN: CPT | Performed by: OBSTETRICS & GYNECOLOGY

## 2025-05-05 PROCEDURE — 82105 ALPHA-FETOPROTEIN SERUM: CPT

## 2025-05-05 RX ORDER — PYRIDOXINE HCL (VITAMIN B6) 25 MG
25 TABLET ORAL EVERY 8 HOURS
Qty: 90 TABLET | Refills: 0 | Status: SHIPPED | OUTPATIENT
Start: 2025-05-05

## 2025-05-05 NOTE — PROGRESS NOTES
Routine Prenatal Visit  [facility] [address], [place], [place]      OB/GYN Prenatal Visit    ASSESSMENT / PLAN:  1. 18 weeks gestation of pregnancy  Assessment & Plan:  NIPT low risk   Needs AFP     Level 2 - 25  2. Short interval between pregnancies affecting pregnancy, antepartum  Assessment & Plan:  For growth in 3rd trimester   3. Thyroid disease affecting pregnancy  Assessment & Plan:  Lab Results   Component Value Date    [id number] 0.420 (L) 2025    TSH 0.84 10/31/2023     Cont current dose.   4. Nausea/vomiting in pregnancy  -     pyridoxine (B-6) 25 MG tablet; Take 1 tablet (25 mg total) by mouth every 8 (eight) hours        SUBJECTIVE:  Mala Jaramillo is a 29 y.o.  at 18 here for prenatal visit.  She has no obstetric complaints and denies pelvic pain, cramping/contractions, vaginal bleeding, loss of fluid, or decreased fetal movement.       The following portions of the patient's history were reviewed and updated as appropriate: allergies, current medications, past family history, past medical history, obstetric history, gynecologic history, past social history, past surgical history and problem list.      Objective:  /62   Wt 86.2 kg (190 lb)   LMP 2024 (Within Weeks)   BMI 33.66 kg/m²   Pregravid Weight/BMI: 85.3 kg (188 lb) (BMI 33.31)  Current Weight: 86.2 kg (190 lb)   Total Weight Gain: 0.907 kg (2 lb)   Pre-Braden Vitals      Flowsheet Row Most Recent Value   Prenatal Assessment    Fetal Heart Rate 145   Movement Present   Prenatal Vitals    Blood Pressure 108/62   Weight - Scale 86.2 kg (190 lb)   Urine Albumin/Glucose    Dilation/Effacement/Station    Vaginal Drainage    Draining Fluid No   Edema    LLE Edema None   RLE Edema None   Facial Edema None               Physical Exam:    General: Well appearing, no distress  Respiratory: Unlabored breathing  Cardiovascular: Regular rate.  Abdomen: Soft, gravid, nontender  Fundal  Height: Appropriate for gestational age.  Extremities: Warm and well perfused.  Non tender.      Simeon Rojas MD

## 2025-05-05 NOTE — ASSESSMENT & PLAN NOTE
Lab Results   Component Value Date    [id number] 0.420 (L) 03/02/2025    TSH 0.84 10/31/2023     Cont current dose.

## 2025-05-06 ENCOUNTER — TELEPHONE (OUTPATIENT)
Age: 30
End: 2025-05-06

## 2025-05-06 NOTE — TELEPHONE ENCOUNTER
Informed patient AFP is still in process. The rest of her labs are getting review by provider as soon as they review labs we will give her a call

## 2025-05-08 LAB
2ND TRIMESTER 4 SCREEN SERPL-IMP: NORMAL
AFP ADJ MOM SERPL: 2.39
AFP INTERP AMN-IMP: NORMAL
AFP INTERP SERPL-IMP: NORMAL
AFP INTERP SERPL-IMP: NORMAL
AFP SERPL-MCNC: 90.7 NG/ML
AGE AT DELIVERY: 30.1 YR
GA METHOD: NORMAL
GA: 18.1 WEEKS
IDDM PATIENT QL: NO
MULTIPLE PREGNANCY: NO
NEURAL TUBE DEFECT RISK FETUS: 356 %

## 2025-05-10 ENCOUNTER — APPOINTMENT (OUTPATIENT)
Dept: LAB | Facility: HOSPITAL | Age: 30
End: 2025-05-10
Payer: COMMERCIAL

## 2025-05-10 DIAGNOSIS — R73.09 ABNORMAL GTT (GLUCOSE TOLERANCE TEST): ICD-10-CM

## 2025-05-10 LAB
GLUCOSE 1H P 100 G GLC PO SERPL-MCNC: 141 MG/DL (ref 65–179)
GLUCOSE 2H P 100 G GLC PO SERPL-MCNC: 127 MG/DL (ref 65–154)
GLUCOSE 3H P 100 G GLC PO SERPL-MCNC: 54 MG/DL (ref 65–139)
GLUCOSE P FAST SERPL-MCNC: 78 MG/DL (ref 65–94)

## 2025-05-10 PROCEDURE — 82951 GLUCOSE TOLERANCE TEST (GTT): CPT

## 2025-05-10 PROCEDURE — 36415 COLL VENOUS BLD VENIPUNCTURE: CPT

## 2025-05-10 PROCEDURE — 82952 GTT-ADDED SAMPLES: CPT

## 2025-05-14 ENCOUNTER — RESULTS FOLLOW-UP (OUTPATIENT)
Dept: OBGYN CLINIC | Facility: MEDICAL CENTER | Age: 30
End: 2025-05-14

## 2025-05-22 ENCOUNTER — TELEPHONE (OUTPATIENT)
Dept: OBGYN CLINIC | Facility: MEDICAL CENTER | Age: 30
End: 2025-05-22

## 2025-05-22 DIAGNOSIS — O99.280 THYROID DISEASE AFFECTING PREGNANCY: Primary | ICD-10-CM

## 2025-05-22 DIAGNOSIS — Z3A.20 20 WEEKS GESTATION OF PREGNANCY: ICD-10-CM

## 2025-05-22 DIAGNOSIS — E07.9 THYROID DISEASE AFFECTING PREGNANCY: Primary | ICD-10-CM

## 2025-05-22 NOTE — TELEPHONE ENCOUNTER
2ND TRIMESTER CHECK-IN CALL      Overall how are you doing? Patient reports to be doing well overall. Taking Vitamin B6 at nighttime for nausea symptoms. States had a headache this past weekend. Fluid intake, Tylenol and Magnesium supplementation reviewed. Precautions reviewed and advised to call office with any concerns. Patient requesting TSH blood work to be ordered. It has not been checked this trimester yet. Order placed.     Compliant with routine OB care appointments? Yes.     Have you completed your 1st trimester labs? Yes.     If you had NIPS with MFM, do you have a order for MSAFP? Completed. Screen negative.     Have you seen MFM and do you have your detailed US scheduled? Yes. Has anatomy scan scheduled 5/28.     Pregnancy Education: Have you had a chance to review the classes/hospital tour offered and registered? Not interested at this time.

## 2025-05-28 ENCOUNTER — ROUTINE PRENATAL (OUTPATIENT)
Age: 30
End: 2025-05-28
Payer: COMMERCIAL

## 2025-05-28 ENCOUNTER — ANCILLARY PROCEDURE (OUTPATIENT)
Age: 30
End: 2025-05-28
Attending: OBSTETRICS & GYNECOLOGY
Payer: COMMERCIAL

## 2025-05-28 ENCOUNTER — APPOINTMENT (OUTPATIENT)
Dept: LAB | Facility: HOSPITAL | Age: 30
End: 2025-05-28
Payer: COMMERCIAL

## 2025-05-28 VITALS
SYSTOLIC BLOOD PRESSURE: 102 MMHG | WEIGHT: 194.6 LBS | DIASTOLIC BLOOD PRESSURE: 68 MMHG | HEIGHT: 63 IN | BODY MASS INDEX: 34.48 KG/M2 | HEART RATE: 74 BPM

## 2025-05-28 DIAGNOSIS — O99.212 MATERNAL OBESITY, ANTEPARTUM, SECOND TRIMESTER: Primary | ICD-10-CM

## 2025-05-28 DIAGNOSIS — F41.9 ANXIETY DURING PREGNANCY, ANTEPARTUM, SECOND TRIMESTER: ICD-10-CM

## 2025-05-28 DIAGNOSIS — O09.892 SHORT INTERVAL BETWEEN PREGNANCIES COMPLICATING PREGNANCY, ANTEPARTUM, SECOND TRIMESTER: ICD-10-CM

## 2025-05-28 DIAGNOSIS — O99.282 HYPOTHYROIDISM DURING PREGNANCY IN SECOND TRIMESTER: ICD-10-CM

## 2025-05-28 DIAGNOSIS — E03.9 HYPOTHYROIDISM DURING PREGNANCY IN SECOND TRIMESTER: ICD-10-CM

## 2025-05-28 DIAGNOSIS — E66.811 CLASS 1 OBESITY: ICD-10-CM

## 2025-05-28 DIAGNOSIS — O99.280 THYROID DISEASE AFFECTING PREGNANCY: ICD-10-CM

## 2025-05-28 DIAGNOSIS — Z36.86 ENCOUNTER FOR ANTENATAL SCREENING FOR CERVICAL LENGTH: ICD-10-CM

## 2025-05-28 DIAGNOSIS — O99.342 ANXIETY DURING PREGNANCY, ANTEPARTUM, SECOND TRIMESTER: ICD-10-CM

## 2025-05-28 DIAGNOSIS — E07.9 THYROID DISEASE AFFECTING PREGNANCY: ICD-10-CM

## 2025-05-28 DIAGNOSIS — Z3A.21 21 WEEKS GESTATION OF PREGNANCY: ICD-10-CM

## 2025-05-28 DIAGNOSIS — Z3A.20 20 WEEKS GESTATION OF PREGNANCY: ICD-10-CM

## 2025-05-28 DIAGNOSIS — O28.0 ABNORMAL MSAFP (MATERNAL SERUM ALPHA-FETOPROTEIN), ELEVATED: ICD-10-CM

## 2025-05-28 LAB — TSH SERPL DL<=0.05 MIU/L-ACNC: 0.58 UIU/ML (ref 0.45–4.5)

## 2025-05-28 PROCEDURE — 84443 ASSAY THYROID STIM HORMONE: CPT

## 2025-05-28 PROCEDURE — 36415 COLL VENOUS BLD VENIPUNCTURE: CPT

## 2025-05-28 PROCEDURE — 76811 OB US DETAILED SNGL FETUS: CPT

## 2025-05-28 PROCEDURE — 76817 TRANSVAGINAL US OBSTETRIC: CPT

## 2025-05-28 PROCEDURE — NC001 PR NO CHARGE: Performed by: OBSTETRICS & GYNECOLOGY

## 2025-05-28 PROCEDURE — 76817 TRANSVAGINAL US OBSTETRIC: CPT | Performed by: OBSTETRICS & GYNECOLOGY

## 2025-05-28 PROCEDURE — 99214 OFFICE O/P EST MOD 30 MIN: CPT | Performed by: OBSTETRICS & GYNECOLOGY

## 2025-05-28 PROCEDURE — 76811 OB US DETAILED SNGL FETUS: CPT | Performed by: OBSTETRICS & GYNECOLOGY

## 2025-05-28 NOTE — ASSESSMENT & PLAN NOTE
Currently treated with 25 mcg of levothyroxine a day.    TSH level on March 2, 2025 0.420 uIU/mL    Repeat TSH level recommended in early Emmy   FAMILY HISTORY:  Father  Still living? Unknown  FH: HTN (hypertension), Age at diagnosis: Age Unknown    Sibling  Still living? Unknown  Family history of blood clots, Age at diagnosis: Age Unknown

## 2025-05-28 NOTE — LETTER
May 28, 2025     Simeon Rojas MD  [address] [place] [address]    Patient: Mala Jaramillo   YOB: 1995   Date of Visit: 5/28/2025       Dear Dr. Simeon Rojas MD:    Thank you for referring Mala Jaramillo to me for evaluation. Below are my notes for this consultation.    If you have questions, please do not hesitate to call me. I look forward to following your patient along with you.         Sincerely,        Justin Munguia MD        CC: No Recipients

## 2025-05-28 NOTE — PROGRESS NOTES
Ultrasound Probe Disinfection    A transvaginal ultrasound was performed.     Chaperone: Lory Fernandes RDMS

## 2025-05-28 NOTE — PROGRESS NOTES
FOLLOW-UP: MATERNAL-FETAL MEDICINE  Name: Mala Jaramillo      : 1995      MRN: [id number]  Encounter Provider: Justin Munguia MD  Encounter Date: 2025   Encounter department: [facility] [address]  :  Assessment & Plan  21 weeks gestation of pregnancy         Encounter for  screening for cervical length       Normal cervical length by transvaginal sonography today  Hypothyroidism during pregnancy in second trimester       Currently treated with 25 mcg of levothyroxine a day.    TSH level on 2025 0.420 uIU/mL    Repeat TSH level recommended in early   Maternal obesity, antepartum, second trimester       1 hour post Glucola value elevated at 140 mg/dL May 5, 2025    3-hour glucose tolerance test 4 out of 4 normal values May 10, 2025    Repeat 3-hour glucose tolerance test recommended at about 28 weeks gestation    Normal detailed MFM fetal ultrasound study today    Recommend assessment of interval growth in the early third trimester  Class 1 obesity         Short interval between pregnancies complicating pregnancy, antepartum, second trimester       Recommend assessment of interval growth in the early third trimester  Abnormal MSAFP (maternal serum alpha-fetoprotein), elevated       2.39 MoM    Increased risk for  birth, preeclampsia, abruption, fetal growth restriction, and stillbirth    Recommend assessment of interval growth in the early third trimester    Recommend weekly nonstress testing beginning at 36 weeks gestation, sooner if otherwise clinically indicated  Anxiety during pregnancy, antepartum, second trimester    Stable without medical therapy             History of Present Illness   HPI  Mala Jaramillo is a 29 y.o. female who presents for fetal ultrasound evaluation and MFM assessment.  [facility]: Mala Jaramillo was seen today for anatomic survey and cervical length screening ultrasound. See ultrasound report under  "\"Imaging\" tab.         "

## 2025-05-28 NOTE — ASSESSMENT & PLAN NOTE
1 hour post Glucola value elevated at 140 mg/dL May 5, 2025    3-hour glucose tolerance test 4 out of 4 normal values May 10, 2025    Repeat 3-hour glucose tolerance test recommended at about 28 weeks gestation    Normal detailed MFM fetal ultrasound study today    Recommend assessment of interval growth in the early third trimester

## 2025-05-28 NOTE — ASSESSMENT & PLAN NOTE
2.39 MoM    Increased risk for  birth, preeclampsia, abruption, fetal growth restriction, and stillbirth    Recommend assessment of interval growth in the early third trimester    Recommend weekly nonstress testing beginning at 36 weeks gestation, sooner if otherwise clinically indicated

## 2025-06-05 ENCOUNTER — ROUTINE PRENATAL (OUTPATIENT)
Dept: OBGYN CLINIC | Facility: MEDICAL CENTER | Age: 30
End: 2025-06-05
Payer: COMMERCIAL

## 2025-06-05 VITALS
BODY MASS INDEX: 34.41 KG/M2 | SYSTOLIC BLOOD PRESSURE: 104 MMHG | HEIGHT: 63 IN | WEIGHT: 194.2 LBS | DIASTOLIC BLOOD PRESSURE: 70 MMHG

## 2025-06-05 DIAGNOSIS — Z3A.22 22 WEEKS GESTATION OF PREGNANCY: Primary | ICD-10-CM

## 2025-06-05 DIAGNOSIS — E03.9 ACQUIRED HYPOTHYROIDISM: ICD-10-CM

## 2025-06-05 DIAGNOSIS — O09.892 SHORT INTERVAL BETWEEN PREGNANCIES COMPLICATING PREGNANCY, ANTEPARTUM, SECOND TRIMESTER: ICD-10-CM

## 2025-06-05 PROBLEM — Z36.86 ENCOUNTER FOR ANTENATAL SCREENING FOR CERVICAL LENGTH: Status: RESOLVED | Noted: 2025-05-28 | Resolved: 2025-06-05

## 2025-06-05 PROBLEM — N94.9 ROUND LIGAMENT PAIN: Status: RESOLVED | Noted: 2023-12-05 | Resolved: 2025-06-05

## 2025-06-05 PROBLEM — R10.9 ABDOMINAL PAIN DURING PREGNANCY IN FIRST TRIMESTER: Status: RESOLVED | Noted: 2024-02-08 | Resolved: 2025-06-05

## 2025-06-05 PROBLEM — F41.9 ANXIETY DURING PREGNANCY, ANTEPARTUM, SECOND TRIMESTER: Status: RESOLVED | Noted: 2025-05-28 | Resolved: 2025-06-05

## 2025-06-05 PROBLEM — O26.891 ABDOMINAL PAIN DURING PREGNANCY IN FIRST TRIMESTER: Status: RESOLVED | Noted: 2024-02-08 | Resolved: 2025-06-05

## 2025-06-05 PROBLEM — O99.342 ANXIETY DURING PREGNANCY, ANTEPARTUM, SECOND TRIMESTER: Status: RESOLVED | Noted: 2025-05-28 | Resolved: 2025-06-05

## 2025-06-05 PROCEDURE — 99213 OFFICE O/P EST LOW 20 MIN: CPT | Performed by: STUDENT IN AN ORGANIZED HEALTH CARE EDUCATION/TRAINING PROGRAM

## 2025-06-05 NOTE — ASSESSMENT & PLAN NOTE
Orders:    Glucose CHELLE 3HR 100GM PREG PTS; Future    CBC and Platelet; Future    Anemia Panel w/Reflex, OB; Future    RPR-Syphilis Screening (Total Syphilis IGG/IGM); Future

## 2025-06-05 NOTE — PROGRESS NOTES
"Name: Mala Jaramillo      : 1995      MRN: [id number]  Encounter Provider: Mesha Fulton MD  Encounter Date: 2025   [facility]: [facility]  :  Assessment & Plan  Short interval between pregnancies complicating pregnancy, antepartum, second trimester         22 weeks gestation of pregnancy    Orders:    Glucose CHELLE 3HR 100GM PREG PTS; Future    CBC and Platelet; Future    Anemia Panel w/Reflex, OB; Future    RPR-Syphilis Screening (Total Syphilis IGG/IGM); Future        History of Present Illness   The patient denies leakage of fluid, pelvic pain, or vaginal bleeding.  Reports fetal movement.        Mala Jaramillo is a 29 y.o. female who presents for routine prenatal care.    History obtained from: patient    Review of Systems   Constitutional:  Negative for chills and fever.   Respiratory:  Negative for cough and shortness of breath.    Cardiovascular:  Negative for chest pain and leg swelling.   Gastrointestinal:  Negative for abdominal pain, nausea and vomiting.   Genitourinary:  Negative for dysuria, frequency and urgency.   Neurological:  Negative for dizziness, light-headedness and headaches.     Medical History Reviewed by provider this encounter:     .     Objective   /70   Ht 5' 3.25\" (1.607 m)   Wt 88.1 kg (194 lb 3.2 oz)   LMP 2024 (Within Weeks)   BMI 34.13 kg/m²      Physical Exam  Constitutional:       Appearance: Normal appearance.   HENT:      Head: Normocephalic.      Mouth/Throat:      Mouth: Mucous membranes are moist.     Cardiovascular:      Rate and Rhythm: Normal rate.   Pulmonary:      Effort: Pulmonary effort is normal.   Abdominal:      Comments: Soft, gravid, nontender     Musculoskeletal:      Cervical back: Normal range of motion.     Skin:     General: Skin is warm and dry.     Neurological:      General: No focal deficit present.      Mental Status: She is alert.     Psychiatric:         Mood and Affect: " Mood normal.

## 2025-06-06 RX ORDER — LEVOTHYROXINE SODIUM 25 UG/1
25 TABLET ORAL DAILY
Qty: 90 TABLET | Refills: 1 | Status: SHIPPED | OUTPATIENT
Start: 2025-06-06

## 2025-06-12 ENCOUNTER — TELEPHONE (OUTPATIENT)
Dept: OBGYN CLINIC | Facility: MEDICAL CENTER | Age: 30
End: 2025-06-12

## 2025-06-16 ENCOUNTER — OFFICE VISIT (OUTPATIENT)
Dept: FAMILY MEDICINE CLINIC | Facility: CLINIC | Age: 30
End: 2025-06-16
Payer: COMMERCIAL

## 2025-06-16 ENCOUNTER — TELEPHONE (OUTPATIENT)
Age: 30
End: 2025-06-16

## 2025-06-16 VITALS
WEIGHT: 197 LBS | HEIGHT: 64 IN | SYSTOLIC BLOOD PRESSURE: 112 MMHG | BODY MASS INDEX: 33.63 KG/M2 | TEMPERATURE: 98.1 F | DIASTOLIC BLOOD PRESSURE: 60 MMHG | HEART RATE: 77 BPM | OXYGEN SATURATION: 98 %

## 2025-06-16 DIAGNOSIS — H69.91 DISORDER OF RIGHT EUSTACHIAN TUBE: ICD-10-CM

## 2025-06-16 DIAGNOSIS — Z3A.26 26 WEEKS GESTATION OF PREGNANCY: ICD-10-CM

## 2025-06-16 DIAGNOSIS — R42 DIZZINESS: ICD-10-CM

## 2025-06-16 DIAGNOSIS — R09.89 CHEST CONGESTION: Primary | ICD-10-CM

## 2025-06-16 DIAGNOSIS — H92.01 RIGHT EAR PAIN: ICD-10-CM

## 2025-06-16 LAB
SARS-COV-2 AG UPPER RESP QL IA: NEGATIVE
SL AMB POCT RAPID FLU A: NEGATIVE
SL AMB POCT RAPID FLU B: NEGATIVE
VALID CONTROL: NORMAL

## 2025-06-16 PROCEDURE — 87804 INFLUENZA ASSAY W/OPTIC: CPT | Performed by: FAMILY MEDICINE

## 2025-06-16 PROCEDURE — 99214 OFFICE O/P EST MOD 30 MIN: CPT | Performed by: FAMILY MEDICINE

## 2025-06-16 PROCEDURE — 87811 SARS-COV-2 COVID19 W/OPTIC: CPT | Performed by: FAMILY MEDICINE

## 2025-06-16 RX ORDER — AZITHROMYCIN 250 MG/1
TABLET, FILM COATED ORAL
Qty: 6 TABLET | Refills: 0 | Status: SHIPPED | OUTPATIENT
Start: 2025-06-16 | End: 2025-06-21

## 2025-06-16 NOTE — PATIENT INSTRUCTIONS
Rest and fluids and start abx and tylenol prn pain. Patient to see GYN for pregnancy and she will contact her GYN of same re symptoms. Start abx for sinusitis and chest congestion. And right ear pain. Covid and flu test wnl. Covid and flu test negative.

## 2025-06-16 NOTE — PROGRESS NOTES
Name: Mala Jaramillo      : 1995      MRN: [id number]  Encounter Provider: Krystle Brewer DO  Encounter Date: 2025   Encounter department: [facility] PRIMARY CARE  Chief Complaint   Patient presents with    Cold Like Symptoms     Pt feels congestion, dizzy R ear pain.     Patient Instructions   Rest and fluids and start abx and tylenol prn pain. Patient to see GYN for pregnancy and she will contact her GYN of same re symptoms. Start abx for sinusitis and chest congestion. And right ear pain. Covid and flu test wnl. Covid and flu test negative.     Assessment & Plan  Chest congestion  Stay well hydrated.   Orders:    POCT Rapid Covid Ag    POCT rapid flu A and B    azithromycin (Zithromax) 250 mg tablet; Take 2 tablets (500 mg total) by mouth daily for 1 day, THEN 1 tablet (250 mg total) daily for 4 days.    26 weeks gestation of pregnancy  F-up with GYN       Right ear pain  Tylenol prn pain  Orders:    azithromycin (Zithromax) 250 mg tablet; Take 2 tablets (500 mg total) by mouth daily for 1 day, THEN 1 tablet (250 mg total) daily for 4 days.    Dizziness  Stay well hydrated       Disorder of right eustachian tube  Stay well hydrated and call if worse.             History of Present Illness     Cold Like Symptoms (Pt feels congestion, dizzy R ear pain.) patient is 26 weeks pregnant and right ear hurts.     Earache   There is pain in the right ear. This is a new problem. The current episode started today. The problem occurs hourly. The problem has been gradually worsening. There has been no fever. The fever has been present for Less than 1 day. The pain is at a severity of 4/10. Associated symptoms include headaches and rhinorrhea. Pertinent negatives include no abdominal pain, coughing, diarrhea, ear discharge, hearing loss, neck pain, rash, sore throat or vomiting.     Review of Systems   Constitutional: Negative.    HENT:  Positive for ear pain and rhinorrhea. Negative for ear  "discharge, hearing loss and sore throat.    Eyes: Negative.    Respiratory: Negative.  Negative for cough.    Cardiovascular: Negative.    Gastrointestinal: Negative.  Negative for abdominal pain, diarrhea and vomiting.   Endocrine: Negative.    Genitourinary: Negative.    Musculoskeletal: Negative.  Negative for neck pain.   Skin: Negative.  Negative for rash.   Allergic/Immunologic: Negative.    Neurological:  Positive for headaches.   Hematological: Negative.    Psychiatric/Behavioral: Negative.       Past Medical History[1]  Past Surgical History[2]  Family History[3]  Social History[4]  Medications[5]  No Known Allergies  Immunization History   Administered Date(s) Administered    COVID-19 MODERNA VACC 0.5 ML IM 01/08/2021, 02/08/2021, 01/06/2022    Hep B, adult 08/07/2018    INFLUENZA 12/01/2015, 10/26/2020, 10/29/2021, 10/12/2022, 10/14/2023, 11/03/2024    Influenza, injectable, quadrivalent, preservative free 0.5 mL 10/12/2022, 10/14/2023    Pneumococcal Polysaccharide PPV23 11/13/2017    Tdap 06/30/2020, 12/07/2023     Objective   /60   Pulse 77   Temp 98.1 °F (36.7 °C)   Ht 5' 4.17\" (1.63 m)   Wt 89.4 kg (197 lb)   LMP 12/14/2024 (Within Weeks)   SpO2 98%   BMI 33.63 kg/m²     Physical Exam  Constitutional:       Appearance: She is well-developed.      Comments: 26 weeks pregnant   HENT:      Head: Normocephalic and atraumatic.      Right Ear: External ear normal.      Left Ear: External ear normal.      Nose: Rhinorrhea present.     Eyes:      Conjunctiva/sclera: Conjunctivae normal.      Pupils: Pupils are equal, round, and reactive to light.       Cardiovascular:      Rate and Rhythm: Normal rate and regular rhythm.      Pulses: Normal pulses.      Heart sounds: Normal heart sounds.   Pulmonary:      Effort: Pulmonary effort is normal.      Breath sounds: Normal breath sounds.     Musculoskeletal:         General: Normal range of motion.      Cervical back: Normal range of motion and neck " supple.     Skin:     General: Skin is warm and dry.      Capillary Refill: Capillary refill takes less than 2 seconds.     Neurological:      General: No focal deficit present.      Mental Status: She is alert and oriented to person, place, and time. Mental status is at baseline.      Deep Tendon Reflexes: Reflexes are normal and symmetric.     Psychiatric:         Mood and Affect: Mood normal.         Behavior: Behavior normal.         Thought Content: Thought content normal.         Judgment: Judgment normal.       Administrative Statements   I have spent a total time of 32 minutes in caring for this patient on the day of the visit/encounter including Diagnostic results, Prognosis, Risks and benefits of tx options, Instructions for management, Patient and family education, Importance of tx compliance, Risk factor reductions, Impressions, Counseling / Coordination of care, Documenting in the medical record, Reviewing/placing orders in the medical record (including tests, medications, and/or procedures), and Obtaining or reviewing history  .         [1]   Past Medical History:  Diagnosis Date    Anxiety     Asthma     Hypothyroidism     Migraines     Pancytopenia (HCC) 12/12/2018   [2]   Past Surgical History:  Procedure Laterality Date    EXTERNAL EAR SURGERY     [3]   Family History  Problem Relation Name Age of Onset    Anemia Mother Marleny Chand     Migraines Mother Marleny Chand     Miscarriages / Stillbirths Mother Marleny Chand     Hypertension Father Jan Lazar     No Known Problems Sister      No Known Problems Sister      Diabetes Maternal Grandmother      Heart disease Maternal Grandmother      Hypertension Maternal Grandmother      Hyperlipidemia Maternal Grandmother      Alzheimer's disease Maternal Grandmother      Alzheimer's disease Maternal Grandfather Danni Deng     Cataracts Maternal Grandfather Danni Deng     Asthma Maternal Grandfather Danni Deng     Thyroid disease Maternal  Grandfather Danni Deng     Stroke Paternal Grandmother      Thyroid disease Paternal Grandmother      Uterine cancer Other Mother's cousin     No Known Problems Son      Breast cancer Neg Hx      Colon cancer Neg Hx      Ovarian cancer Neg Hx     [4]   Social History  Tobacco Use    Smoking status: Never    Smokeless tobacco: Never   Vaping Use    Vaping status: Never Used   Substance and Sexual Activity    Alcohol use: Not Currently     Comment: social only    Drug use: No    Sexual activity: Yes     Partners: Male     Birth control/protection: Other   [5]   Current Outpatient Medications on File Prior to Visit   Medication Sig    acetaminophen (TYLENOL) 650 mg CR tablet Take 1 tablet (650 mg total) by mouth every 8 (eight) hours as needed for mild pain    levothyroxine 25 mcg tablet Take 1 tablet (25 mcg total) by mouth daily    magnesium (MAGTAB) 84 MG (7MEQ) TBCR Take 84 mg by mouth in the morning.    Prenatal Vit-Fe Fumarate-FA (PRENATAL VITAMIN PO) Take by mouth    pyridoxine (B-6) 25 MG tablet Take 1 tablet (25 mg total) by mouth every 8 (eight) hours    [DISCONTINUED] ondansetron (ZOFRAN-ODT) 4 mg disintegrating tablet Take 1 tablet (4 mg total) by mouth every 8 (eight) hours as needed for nausea for up to 30 doses

## 2025-06-16 NOTE — TELEPHONE ENCOUNTER
Patient is 24w1d  calling to report cold like symptoms. Was seen by PCP today and prescribed Azithromycin. Review abc safe in pregnancy. Reviewed OTC meds safe in pregnancy. Otherwise advised hydration and rest. Patient verbalizes understanding.

## 2025-06-16 NOTE — TELEPHONE ENCOUNTER
Patient at pharmacy. States she picked up plain robitussin but notes it contains menthol. Advised medication okay for short duration of use and advised patient follow package directions for dosing instructions

## 2025-06-17 ENCOUNTER — APPOINTMENT (EMERGENCY)
Dept: RADIOLOGY | Facility: HOSPITAL | Age: 30
End: 2025-06-17
Payer: COMMERCIAL

## 2025-06-17 ENCOUNTER — HOSPITAL ENCOUNTER (EMERGENCY)
Facility: HOSPITAL | Age: 30
Discharge: HOME | End: 2025-06-17
Attending: EMERGENCY MEDICINE | Admitting: EMERGENCY MEDICINE
Payer: COMMERCIAL

## 2025-06-17 ENCOUNTER — NURSE TRIAGE (OUTPATIENT)
Age: 30
End: 2025-06-17

## 2025-06-17 VITALS
HEART RATE: 80 BPM | SYSTOLIC BLOOD PRESSURE: 108 MMHG | DIASTOLIC BLOOD PRESSURE: 56 MMHG | RESPIRATION RATE: 16 BRPM | OXYGEN SATURATION: 96 % | TEMPERATURE: 98.4 F

## 2025-06-17 DIAGNOSIS — R07.9 CHEST PAIN: Primary | ICD-10-CM

## 2025-06-17 DIAGNOSIS — J06.9 URI (UPPER RESPIRATORY INFECTION): ICD-10-CM

## 2025-06-17 LAB
ALBUMIN SERPL BCG-MCNC: 3.5 G/DL (ref 3.5–5)
ALP SERPL-CCNC: 56 U/L (ref 34–104)
ALT SERPL W P-5'-P-CCNC: 7 U/L (ref 7–52)
ANION GAP SERPL CALCULATED.3IONS-SCNC: 6 MMOL/L (ref 4–13)
AST SERPL W P-5'-P-CCNC: 13 U/L (ref 13–39)
ATRIAL RATE: 70 BPM
BASOPHILS # BLD AUTO: 0.02 THOUSANDS/ÂΜL (ref 0–0.1)
BASOPHILS NFR BLD AUTO: 0 % (ref 0–1)
BILIRUB SERPL-MCNC: 0.46 MG/DL (ref 0.2–1)
BUN SERPL-MCNC: 8 MG/DL (ref 5–25)
CALCIUM SERPL-MCNC: 9.4 MG/DL (ref 8.4–10.2)
CARDIAC TROPONIN I PNL SERPL HS: <2 NG/L (ref ?–50)
CHLORIDE SERPL-SCNC: 106 MMOL/L (ref 96–108)
CO2 SERPL-SCNC: 23 MMOL/L (ref 21–32)
CREAT SERPL-MCNC: 0.52 MG/DL (ref 0.6–1.3)
EOSINOPHIL # BLD AUTO: 0.18 THOUSAND/ÂΜL (ref 0–0.61)
EOSINOPHIL NFR BLD AUTO: 2 % (ref 0–6)
ERYTHROCYTE [DISTWIDTH] IN BLOOD BY AUTOMATED COUNT: 13.2 % (ref 11.6–15.1)
GFR SERPL CREATININE-BSD FRML MDRD: 129 ML/MIN/1.73SQ M
GLUCOSE SERPL-MCNC: 98 MG/DL (ref 65–140)
HCT VFR BLD AUTO: 31.6 % (ref 34.8–46.1)
HGB BLD-MCNC: 10.4 G/DL (ref 11.5–15.4)
IMM GRANULOCYTES # BLD AUTO: 0.22 THOUSAND/UL (ref 0–0.2)
IMM GRANULOCYTES NFR BLD AUTO: 2 % (ref 0–2)
LYMPHOCYTES # BLD AUTO: 2.12 THOUSANDS/ÂΜL (ref 0.6–4.47)
LYMPHOCYTES NFR BLD AUTO: 22 % (ref 14–44)
MCH RBC QN AUTO: 28 PG (ref 26.8–34.3)
MCHC RBC AUTO-ENTMCNC: 32.9 G/DL (ref 31.4–37.4)
MCV RBC AUTO: 85 FL (ref 82–98)
MONOCYTES # BLD AUTO: 0.4 THOUSAND/ÂΜL (ref 0.17–1.22)
MONOCYTES NFR BLD AUTO: 4 % (ref 4–12)
NEUTROPHILS # BLD AUTO: 6.56 THOUSANDS/ÂΜL (ref 1.85–7.62)
NEUTS SEG NFR BLD AUTO: 70 % (ref 43–75)
NRBC BLD AUTO-RTO: 0 /100 WBCS
P AXIS: 40 DEGREES
PLATELET # BLD AUTO: 197 THOUSANDS/UL (ref 149–390)
PMV BLD AUTO: 10.6 FL (ref 8.9–12.7)
POTASSIUM SERPL-SCNC: 4 MMOL/L (ref 3.5–5.3)
PR INTERVAL: 136 MS
PROT SERPL-MCNC: 6.4 G/DL (ref 6.4–8.4)
QRS AXIS: 79 DEGREES
QRSD INTERVAL: 76 MS
QT INTERVAL: 408 MS
QTC INTERVAL: 440 MS
RBC # BLD AUTO: 3.71 MILLION/UL (ref 3.81–5.12)
SODIUM SERPL-SCNC: 135 MMOL/L (ref 135–147)
T WAVE AXIS: 47 DEGREES
VENTRICULAR RATE: 70 BPM
WBC # BLD AUTO: 9.5 THOUSAND/UL (ref 4.31–10.16)

## 2025-06-17 PROCEDURE — 80053 COMPREHEN METABOLIC PANEL: CPT | Performed by: EMERGENCY MEDICINE

## 2025-06-17 PROCEDURE — 71045 X-RAY EXAM CHEST 1 VIEW: CPT

## 2025-06-17 PROCEDURE — 85025 COMPLETE CBC W/AUTO DIFF WBC: CPT | Performed by: EMERGENCY MEDICINE

## 2025-06-17 PROCEDURE — 93010 ELECTROCARDIOGRAM REPORT: CPT | Performed by: INTERNAL MEDICINE

## 2025-06-17 PROCEDURE — 99285 EMERGENCY DEPT VISIT HI MDM: CPT | Performed by: EMERGENCY MEDICINE

## 2025-06-17 PROCEDURE — 93005 ELECTROCARDIOGRAM TRACING: CPT

## 2025-06-17 PROCEDURE — 36415 COLL VENOUS BLD VENIPUNCTURE: CPT | Performed by: EMERGENCY MEDICINE

## 2025-06-17 PROCEDURE — 84484 ASSAY OF TROPONIN QUANT: CPT | Performed by: EMERGENCY MEDICINE

## 2025-06-17 PROCEDURE — 99285 EMERGENCY DEPT VISIT HI MDM: CPT

## 2025-06-17 RX ORDER — MAGNESIUM HYDROXIDE/ALUMINUM HYDROXICE/SIMETHICONE 120; 1200; 1200 MG/30ML; MG/30ML; MG/30ML
30 SUSPENSION ORAL ONCE
Status: COMPLETED | OUTPATIENT
Start: 2025-06-17 | End: 2025-06-17

## 2025-06-17 RX ADMIN — ALUMINUM HYDROXIDE, MAGNESIUM HYDROXIDE, AND DIMETHICONE 30 ML: 200; 20; 200 SUSPENSION ORAL at 17:26

## 2025-06-17 NOTE — ED PROVIDER NOTES
Time reflects when diagnosis was documented in both MDM as applicable and the Disposition within this note       Time User Action Codes Description Comment    6/17/2025  5:13 PM Lalo Sarah Add [R07.9] Chest pain     6/17/2025  5:13 PM Lalo Sarah Add [J06.9] URI (upper respiratory infection)           ED Disposition       ED Disposition   Discharge    Condition   Stable    Date/Time   Tue Jun 17, 2025  6:18 PM    Comment   Mala Jaramillo discharge to home/self care.                   Assessment & Plan       Medical Decision Making  Patient is a 29-year-old female, 24 weeks pregnant, comes in with complaints of chest pain, started last night, patient has been having URI symptoms, associated with Cough, congestion, started on Z-pack and Tussin by PCP, felt like heartburn.  No abdominal pain, vaginal bleeding, fluid leakage. On exam, patient is conscious, alert, vital signs are stable, no acute distress, lungs are clear, heart sounds regular, abdomen is soft, nontender.  Differential diagnosis: Bronchitis, pneumonia, GERD, doubt ACS as no CV risk factors will check labs, EKG, chest x-ray.  Give Maalox.    Problems Addressed:  Chest pain: acute illness or injury  URI (upper respiratory infection): acute illness or injury    Amount and/or Complexity of Data Reviewed  Labs: ordered. Decision-making details documented in ED Course.  Radiology: ordered and independent interpretation performed. Decision-making details documented in ED Course.  ECG/medicine tests: ordered and independent interpretation performed. Decision-making details documented in ED Course.    Risk  OTC drugs.        ED Course as of 06/17/25 2211   Tue Jun 17, 2025   1742 WBC: 9.50   1742 Hemoglobin(!): 10.4   1742 Platelet Count: 197  CBC reviewed, non-acute.   1755 Sodium: 135   1755 Potassium: 4.0   1755 BUN: 8   1755 Creatinine(!): 0.52   1755 GLUCOSE: 98  CMP non-acute.   1806 hs TnI 0hr: <2  Trop wnl.   1818 XR chest 1 view portable  Chest x-ray  independently reviewed, no significant acute abnormality noted.       Medications   aluminum-magnesium hydroxide-simethicone (MAALOX) oral suspension 30 mL (30 mL Oral Given 6/17/25 1726)       ED Risk Strat Scores      HEART Risk Score      Flowsheet Row Most Recent Value   Heart Score Risk Calculator    History 0 Filed at: 06/17/2025 1806   ECG 0 Filed at: 06/17/2025 1806   Age 0 Filed at: 06/17/2025 1806   Risk Factors 0 Filed at: 06/17/2025 1806   Troponin 0 Filed at: 06/17/2025 1806   HEART Score 0 Filed at: 06/17/2025 1806                      No data recorded                            History of Present Illness       Chief Complaint   Patient presents with    Chest Pain     Chest pressure since last night, started with congestion on Thursday.  Saw PCP yesterday & started on Zithromax & robitusssin.  Pregnant 24 weeks:  OB/Gyn sent pt here for chest pain.          Past Medical History[1]   Past Surgical History[2]   Family History[3]   Social History[4]   E-Cigarette/Vaping    E-Cigarette Use Never User       E-Cigarette/Vaping Substances    Nicotine No     THC No     CBD No     Flavoring No     Other No     Unknown No       I have reviewed and agree with the history as documented.       History provided by:  Patient   used: No    Chest Pain  Pain location:  Substernal area  Pain quality: burning    Pain radiates to:  Does not radiate  Pain radiates to the back: no    Pain severity:  Moderate  Onset quality:  Gradual  Duration:  1 day  Timing:  Intermittent  Progression:  Waxing and waning  Chronicity:  New  Context comment:  Associated with Cough, congestion, started on Z-pack and Tussin by PCP, felt like heartburn  Relieved by:  Nothing  Worsened by:  Nothing tried  Ineffective treatments:  None tried  Associated symptoms: no abdominal pain, no back pain, no cough, no dizziness, no dysphagia, no fever, no headache, no nausea, no shortness of breath and not vomiting    Risk factors:  pregnancy        Review of Systems   Constitutional:  Negative for chills and fever.   HENT:  Negative for facial swelling, sore throat and trouble swallowing.    Eyes:  Negative for pain and visual disturbance.   Respiratory:  Negative for cough, chest tightness and shortness of breath.    Cardiovascular:  Positive for chest pain. Negative for leg swelling.   Gastrointestinal:  Negative for abdominal pain, diarrhea, nausea and vomiting.   Genitourinary:  Negative for dysuria and flank pain.   Musculoskeletal:  Negative for back pain, neck pain and neck stiffness.   Skin:  Negative for pallor and rash.   Allergic/Immunologic: Negative for environmental allergies and immunocompromised state.   Neurological:  Negative for dizziness and headaches.   Hematological:  Negative for adenopathy. Does not bruise/bleed easily.   Psychiatric/Behavioral:  Negative for agitation and behavioral problems.    All other systems reviewed and are negative.          Objective       ED Triage Vitals [06/17/25 1631]   Temperature Pulse Blood Pressure Respirations SpO2 Patient Position - Orthostatic VS   98.4 °F (36.9 °C) 80 108/56 16 96 % --      Temp src Heart Rate Source BP Location FiO2 (%) Pain Score    -- Monitor -- -- 6      Vitals      Date and Time Temp Pulse SpO2 Resp BP Pain Score FACES Pain Rating User   06/17/25 1631 98.4 °F (36.9 °C) 80 96 % 16 108/56 6 -- LAB            Physical Exam  Vitals and nursing note reviewed.   Constitutional:       General: She is not in acute distress.     Appearance: She is well-developed.   HENT:      Head: Normocephalic and atraumatic.     Eyes:      Extraocular Movements: Extraocular movements intact.       Cardiovascular:      Rate and Rhythm: Normal rate and regular rhythm.      Heart sounds: Normal heart sounds.   Pulmonary:      Effort: Pulmonary effort is normal.      Breath sounds: Normal breath sounds.   Abdominal:      Palpations: Abdomen is soft.      Tenderness: There is no abdominal  tenderness. There is no guarding or rebound.     Musculoskeletal:         General: Normal range of motion.      Cervical back: Normal range of motion and neck supple.     Skin:     General: Skin is warm and dry.     Neurological:      General: No focal deficit present.      Mental Status: She is alert and oriented to person, place, and time.     Psychiatric:         Mood and Affect: Mood normal.         Behavior: Behavior normal.         Results Reviewed       Procedure Component Value Units Date/Time    HS Troponin 0hr (reflex protocol) [[id number]]  (Normal) Collected: 06/17/25 1725    Lab Status: Final result Specimen: Blood from Arm, Right Updated: 06/17/25 1755     hs TnI 0hr <2 ng/L     Comprehensive metabolic panel [[id number]]  (Abnormal) Collected: 06/17/25 1725    Lab Status: Final result Specimen: Blood from Arm, Right Updated: 06/17/25 1749     Sodium 135 mmol/L      Potassium 4.0 mmol/L      Chloride 106 mmol/L      CO2 23 mmol/L      ANION GAP 6 mmol/L      BUN 8 mg/dL      Creatinine 0.52 mg/dL      Glucose 98 mg/dL      Calcium 9.4 mg/dL      AST 13 U/L      ALT 7 U/L      Alkaline Phosphatase 56 U/L      Total Protein 6.4 g/dL      Albumin 3.5 g/dL      Total Bilirubin 0.46 mg/dL      eGFR 129 ml/min/1.73sq m     Narrative:      National Kid[employer] Foundation guidelines for Chronic Kidney Disease (CKD):     Stage 1 with normal or high GFR (GFR > 90 mL/min/1.73 square meters)    Stage 2 Mild CKD (GFR = 60-89 mL/min/1.73 square meters)    Stage 3A Moderate CKD (GFR = 45-59 mL/min/1.73 square meters)    Stage 3B Moderate CKD (GFR = 30-44 mL/min/1.73 square meters)    Stage 4 Severe CKD (GFR = 15-29 mL/min/1.73 square meters)    Stage 5 End Stage CKD (GFR <15 mL/min/1.73 square meters)  Note: GFR calculation is accurate only with a steady state creatinine    CBC and differential [[id number]]  (Abnormal) Collected: 06/17/25 1725    Lab Status: Final result Specimen: Blood from Arm, Right Updated:  06/17/25 1734     WBC 9.50 Thousand/uL      RBC 3.71 Million/uL      Hemoglobin 10.4 g/dL      Hematocrit 31.6 %      MCV 85 fL      MCH 28.0 pg      MCHC 32.9 g/dL      RDW 13.2 %      MPV 10.6 fL      Platelets 197 Thousands/uL      nRBC 0 /100 WBCs      Segmented % 70 %      Immature Grans % 2 %      Lymphocytes % 22 %      Monocytes % 4 %      Eosinophils Relative 2 %      Basophils Relative 0 %      Absolute Neutrophils 6.56 Thousands/µL      Absolute Immature Grans 0.22 Thousand/uL      Absolute Lymphocytes 2.12 Thousands/µL      Absolute Monocytes 0.40 Thousand/µL      Eosinophils Absolute 0.18 Thousand/µL      Basophils Absolute 0.02 Thousands/µL             XR chest 1 view portable   Final Interpretation by Maurilio Bojorquez MD (06/17 2044)      No acute cardiopulmonary disease.            Workstation performed: [id number]             ECG 12 Lead Documentation Only    Date/Time: 6/17/2025 5:09 PM    Performed by: Lalo Sarah MD  Authorized by: Lalo Sarah MD    Indications / Diagnosis:  Chest pain  ECG reviewed by me, the ED Provider: yes    Patient location:  ED  Interpretation:     Interpretation: normal    Rate:     ECG rate:  70    ECG rate assessment: normal    Rhythm:     Rhythm: sinus rhythm    Ectopy:     Ectopy: none    QRS:     QRS axis:  Normal  Conduction:     Conduction: normal    ST segments:     ST segments:  Normal  T waves:     T waves: normal        ED Medication and Procedure Management   Prior to Admission Medications   Prescriptions Last Dose Informant Patient Reported? Taking?   Prenatal Vit-Fe Fumarate-FA (PRENATAL VITAMIN PO)  Self Yes No   Sig: Take by mouth   acetaminophen (TYLENOL) 650 mg CR tablet  Self No No   Sig: Take 1 tablet (650 mg total) by mouth every 8 (eight) hours as needed for mild pain   azithromycin (Zithromax) 250 mg tablet   No No   Sig: Take 2 tablets (500 mg total) by mouth daily for 1 day, THEN 1 tablet (250 mg total) daily for 4 days.   levothyroxine 25 mcg  tablet   No No   Sig: Take 1 tablet (25 mcg total) by mouth daily   magnesium (MAGTAB) 84 MG (7MEQ) TBCR  Self Yes No   Sig: Take 84 mg by mouth in the morning.   pyridoxine (B-6) 25 MG tablet   No No   Sig: Take 1 tablet (25 mg total) by mouth every 8 (eight) hours      Facility-Administered Medications: None     Discharge Medication List as of 6/17/2025  6:19 PM        CONTINUE these medications which have NOT CHANGED    Details   acetaminophen (TYLENOL) 650 mg CR tablet Take 1 tablet (650 mg total) by mouth every 8 (eight) hours as needed for mild pain, Starting Fri 2/7/2025, Normal      azithromycin (Zithromax) 250 mg tablet Multiple Dosages:Starting Mon 6/16/2025, Until Mon 6/16/2025 at 2359, THEN Starting Tue 6/17/2025, Until Fri 6/20/2025 at 2359Take 2 tablets (500 mg total) by mouth daily for 1 day, THEN 1 tablet (250 mg total) daily for 4 days., Normal      levothyroxine 25 mcg tablet Take 1 tablet (25 mcg total) by mouth daily, Starting Fri 6/6/2025, Normal      magnesium (MAGTAB) 84 MG (7MEQ) TBCR Take 84 mg by mouth in the morning., Historical Med      Prenatal Vit-Fe Fumarate-FA (PRENATAL VITAMIN PO) Take by mouth, Historical Med      pyridoxine (B-6) 25 MG tablet Take 1 tablet (25 mg total) by mouth every 8 (eight) hours, Starting Mon 5/5/2025, Normal           No discharge procedures on file.  ED SEPSIS DOCUMENTATION   Time reflects when diagnosis was documented in both MDM as applicable and the Disposition within this note       Time User Action Codes Description Comment    6/17/2025  5:13 PM Lalo Sarah Add [R07.9] Chest pain     6/17/2025  5:13 PM Lalo Sarah Add [J06.9] URI (upper respiratory infection)                    [1]   Past Medical History:  Diagnosis Date    Anxiety     Asthma     Hypothyroidism     Migraines     Pancytopenia (HCC) 12/12/2018   [2]   Past Surgical History:  Procedure Laterality Date    EXTERNAL EAR SURGERY     [3]   Family History  Problem Relation Name Age of Onset     Anemia Mother Marleny Chand     Migraines Mother Marleny Chand     Miscarriages / Stillbirths Mother Marleny Chand     Hypertension Father Jan Lazar     No Known Problems Sister      No Known Problems Sister      Diabetes Maternal Grandmother      Heart disease Maternal Grandmother      Hypertension Maternal Grandmother      Hyperlipidemia Maternal Grandmother      Alzheimer's disease Maternal Grandmother      Alzheimer's disease Maternal Grandfather Danni Ish     Cataracts Maternal Grandfather Danni Ish     Asthma Maternal Grandfather Danni Sih     Thyroid disease Maternal Grandfather Danni Ish     Stroke Paternal Grandmother      Thyroid disease Paternal Grandmother      Uterine cancer Other Mother's cousin     No Known Problems Son      Breast cancer Neg Hx      Colon cancer Neg Hx      Ovarian cancer Neg Hx     [4]   Social History  Tobacco Use    Smoking status: Never    Smokeless tobacco: Never   Vaping Use    Vaping status: Never Used   Substance Use Topics    Alcohol use: Not Currently     Comment: social only    Drug use: No        Lalo Sarah MD  06/17/25 [id number]

## 2025-06-17 NOTE — TELEPHONE ENCOUNTER
"REASON FOR CONVERSATION: Chest Pain    SYMPTOMS: chest pain, cough, dizziness     OTHER HEALTH INFORMATION: Pt calling in saying that she's currently 24w2d . Pt saying she has chest \"pressure\" 4/10 pain. Pt was seen by PCP yesterday and is being treated for a cold with antibiotics Azithromycin and pt saying that she's feeling worse then before. Pts chest pain is in the center of her chest that radiates to mid back. Pt reporting feeling nauseous and dizzy. Pt saying that one of her ears is \"clogged\". Pt denies shortness of breath and difficulty breathing.     Pt is advised to go to the Emergency department now for further evaluation, pt saying she will be going to [facility] at 4pm as pt needs to wait for . Pt is advised to go now, pt verbalized understanding but said she needs to wait.   Epic Secure Chat sent to Dr Anju Angeles     PROTOCOL DISPOSITION: Go to ED/UCC Now (Or to Office with PCP Approval)    CARE ADVICE PROVIDED: Pt is provided care advice, and is notified when to call back, pt verbalized understanding.      PRACTICE FOLLOW-UP: n/a          Reason for Disposition   Patient sounds very sick or weak to the triager    Answer Assessment - Initial Assessment Questions  1. LOCATION: \"Where does it hurt?\"        Center of chest   2. RADIATION: \"Does the pain go anywhere else?\" (e.g., into neck, jaw, arms, back)      Back   3. ONSET: \"When did the chest pain begin?\" (Minutes, hours or days)       Last night,   4. PATTERN: \"Does the pain come and go, or has it been constant since it started?\"  \"Does it get worse with exertion?\"       Comes and goes, but has been more constant for the past hour   5. DURATION: \"How long does it last\" (e.g., seconds, minutes, hours)      Constant   6. SEVERITY: \"How bad is the pain?\"  (e.g., Scale 1-10; mild, moderate, or severe)      4/10 \"tightness\"     7. CARDIAC RISK FACTORS: \"Do you have any history of heart problems or risk factors for heart " "disease?\" (e.g., angina, prior heart attack; diabetes, high blood pressure, high cholesterol, smoker, or strong family history of heart disease)      Pt denies   8. PULMONARY RISK FACTORS: \"Do you have any history of lung disease?\"  (e.g., blood clots in lung, asthma, emphysema, birth control pills)      Asthma   9. CAUSE: \"What do you think is causing the chest pain?\"      Pt is congested   10. OTHER SYMPTOMS: \"Do you have any other symptoms?\" (e.g., dizziness, nausea, vomiting, sweating, fever, difficulty breathing, cough)        Pt reporting feeling nauseous and dizzy. Pt saying that one of her ears is \"clogged\" and cough . Pt denies fevers, sweating, difficulty breathing   11. PREGNANCY: \"Is there any chance you are pregnant?\" \"When was your last menstrual period?\"        Pt is 24w2d    Protocols used: Chest Pain-Adult-[place]    "

## 2025-06-17 NOTE — Clinical Note
Mala Jaramillo was seen and treated in our emergency department on 6/17/2025.    No restrictions            Diagnosis:     Mala  may return to work on return date.    She may return on this date: 06/18/2025         If you have any questions or concerns, please don't hesitate to call.      Diego Davis RN    ______________________________           _______________          _______________  Hospital Representative                              Date                                Time

## 2025-06-26 ENCOUNTER — NURSE TRIAGE (OUTPATIENT)
Age: 30
End: 2025-06-26

## 2025-06-26 NOTE — TELEPHONE ENCOUNTER
"REASON FOR CONVERSATION: Fall    SYMPTOMS: Missed chair when trying to sit down and landed on buttocks on ground.    OTHER HEALTH INFORMATION: OB 25w4d  when sitting down to rock toddler last night she missed the chair and landed on the ground on her buttocks. This occurred around 10pm last night. Denies abdominal pain, does have some mid/upper back pain that is mild. Denies LOF, vaginal bleeding. Endorses fetal movement.     ESC sent to [facility]    PROTOCOL DISPOSITION: Home Care (overriding Go to [facility])    CARE ADVICE PROVIDED: Per on-call provider recommend: continue to monitor. Call back if having vaginal bleeding, decreased fetal movement, abdominal pain, LOF. Can try Tylenol for pain. Can also use OTC lidocaine patches, Tiger balm or Biofreeze. Patient informed of all recommendations.      PRACTICE FOLLOW-UP: N/A        Reason for Disposition   Pregnant 20 or more weeks and fall to ground or floor (e.g., walking and tripped)    Answer Assessment - Initial Assessment Questions  1. MECHANISM: \"How did the fall happen?\"      Missed chair and landed on ground on buttocks   3. ONSET: \"When did the fall happen?\" (e.g., minutes, hours, or days ago)      10pm last night  4. LOCATION: \"What part of the body hit the ground?\" (e.g., back, buttocks, head, hips, knees, hands, head, stomach)      buttocks  5. INJURY: \"Did you get hurt when you fell? Are there any obvious injuries?\" If Yes, ask: \"What does the injury look like?\"      denies  6. PAIN: \"Is there any pain?\" If Yes, ask: \"How bad is the pain?\" (e.g., Scale 1-10; or mild,       Mid/upper back pain that is mild  8. FRANK: \"What date are you expecting to deliver?\"      10/5/25  9. FETAL MOVEMENT: \"Has the baby's movement decreased or changed significantly from       Endorses   11. OTHER SYMPTOMS: \"Do you have any other symptoms?\" (e.g., abdomen pain, contractions, leaking of fluid from vagina or concerned water broke, vaginal bleeding)        Denies " LOF, vaginal bleeding, contractions/abdominal pain.    Protocols used: Pregnancy - Fall-Adult-OH

## 2025-06-30 ENCOUNTER — ROUTINE PRENATAL (OUTPATIENT)
Dept: OBGYN CLINIC | Facility: MEDICAL CENTER | Age: 30
End: 2025-06-30
Payer: COMMERCIAL

## 2025-06-30 VITALS — BODY MASS INDEX: 34.03 KG/M2 | WEIGHT: 199.3 LBS | DIASTOLIC BLOOD PRESSURE: 60 MMHG | SYSTOLIC BLOOD PRESSURE: 110 MMHG

## 2025-06-30 DIAGNOSIS — Z34.92 SECOND TRIMESTER PREGNANCY: Primary | ICD-10-CM

## 2025-06-30 DIAGNOSIS — O99.282 HYPOTHYROIDISM DURING PREGNANCY IN SECOND TRIMESTER: ICD-10-CM

## 2025-06-30 DIAGNOSIS — E03.9 HYPOTHYROIDISM DURING PREGNANCY IN SECOND TRIMESTER: ICD-10-CM

## 2025-06-30 DIAGNOSIS — O28.0 ABNORMAL MSAFP (MATERNAL SERUM ALPHA-FETOPROTEIN), ELEVATED: ICD-10-CM

## 2025-06-30 DIAGNOSIS — Z3A.26 26 WEEKS GESTATION OF PREGNANCY: ICD-10-CM

## 2025-06-30 DIAGNOSIS — O09.899 MATERNAL VARICELLA, NON-IMMUNE: ICD-10-CM

## 2025-06-30 DIAGNOSIS — O99.212 MATERNAL OBESITY, ANTEPARTUM, SECOND TRIMESTER: ICD-10-CM

## 2025-06-30 DIAGNOSIS — Z28.39 MATERNAL VARICELLA, NON-IMMUNE: ICD-10-CM

## 2025-06-30 DIAGNOSIS — O09.892 SHORT INTERVAL BETWEEN PREGNANCIES COMPLICATING PREGNANCY, ANTEPARTUM, SECOND TRIMESTER: ICD-10-CM

## 2025-06-30 PROCEDURE — 99214 OFFICE O/P EST MOD 30 MIN: CPT | Performed by: OBSTETRICS & GYNECOLOGY

## 2025-06-30 NOTE — PROGRESS NOTES
Assessment  29 y.o.  at 26w1d presenting for routine prenatal visit.     Plan  Diagnoses and all orders for this visit:    Second trimester pregnancy  26 weeks gestation of pregnancy  - PTL precautions  - Normal movement  - 28wk labs due; has plan for completion  - Would like to review call schedule/IOL potentials next visit (not available at time of this visit); reviewed scheduling protocol for L&D and aware eIOLs will not be booked until 1wk out / subject to availability of unit at that time. Expresses understanding and notes will still be helpful for logistics with childcare  - Return in 2wk for PN    Short interval between pregnancies complicating pregnancy, antepartum, second trimester  Maternal obesity, antepartum, second trimester  - Follows with MFM for growth    Abnormal MSAFP (maternal serum alpha-fetoprotein), elevated (2.39 MoM)  - Follows with MFM  - Normal NIPT/US testing  - Ongoing growth surveillance    Hypothyroidism during pregnancy in second trimester  -     TSH, 3rd generation with Free T4 reflex; Future    Maternal varicella, non-immune  - Booster pp    ____________________________________________________________        Subjective    Mala Jaramillo is a 29 y.o.  at 26w1d who presents for routine prenatal visit. She is without complaint. She denies contractions, loss of fluid, or vaginal bleeding. She feels regular fetal movements.     Pregnancy Problems:  Problem List[1]      Objective  /60   Wt 90.4 kg (199 lb 4.8 oz)   LMP 2024 (Within Weeks)   BMI 34.03 kg/m²     FHT: 145 BPM   Uterine Size: 26 cm     Physical Exam:  Physical Exam  Constitutional:       General: She is not in acute distress.     Appearance: Normal appearance. She is well-developed. She is not ill-appearing, toxic-appearing or diaphoretic.   HENT:      Head: Normocephalic and atraumatic.     Eyes:      General: No scleral icterus.        Right eye: No discharge.         Left eye: No discharge.       Conjunctiva/sclera: Conjunctivae normal.     Pulmonary:      Effort: Pulmonary effort is normal. No accessory muscle usage or respiratory distress.   Abdominal:      General: There is distension (gravid).      Tenderness: There is no abdominal tenderness. There is no guarding or rebound.     Skin:     General: Skin is warm and dry.      Coloration: Skin is not jaundiced.      Findings: No bruising, erythema or rash.     Neurological:      Mental Status: She is alert.     Psychiatric:         Mood and Affect: Mood normal.         Behavior: Behavior normal.         Thought Content: Thought content normal.         Judgment: Judgment normal.                [1]   Patient Active Problem List  Diagnosis    Allergic rhinitis    Generalized anxiety disorder    Acute nonintractable headache    Other headache syndrome    Maternal varicella, non-immune    Short interval between pregnancies affecting pregnancy, antepartum    Obesity affecting pregnancy in first trimester    Thyroid disease affecting pregnancy    26 weeks gestation of pregnancy    Hypothyroidism during pregnancy in second trimester    Maternal obesity, antepartum, second trimester    Short interval between pregnancies complicating pregnancy, antepartum, second trimester    Abnormal MSAFP (maternal serum alpha-fetoprotein), elevated

## 2025-07-02 ENCOUNTER — PATIENT MESSAGE (OUTPATIENT)
Dept: OBGYN CLINIC | Facility: MEDICAL CENTER | Age: 30
End: 2025-07-02

## 2025-07-02 ENCOUNTER — TELEPHONE (OUTPATIENT)
Age: 30
End: 2025-07-02

## 2025-07-02 DIAGNOSIS — Z3A.30 30 WEEKS GESTATION OF PREGNANCY: Primary | ICD-10-CM

## 2025-07-02 DIAGNOSIS — O24.410 DIET CONTROLLED GESTATIONAL DIABETES MELLITUS (GDM) IN THIRD TRIMESTER: ICD-10-CM

## 2025-07-02 NOTE — TELEPHONE ENCOUNTER
Patient called stating that she had forgotten to ask for a script for her breast pump and maternity belt. Patient's insurance co is requesting the script to be sent. Please follow up with the patient to let her know if this can be done.

## 2025-07-14 NOTE — PATIENT INSTRUCTIONS
Thank you for choosing us for your  care today.  If you have any questions about your ultrasound or care, please do not hesitate to contact us or your primary obstetrician.        Some general instructions for your pregnancy are:    Exercise: Aim for 150 minutes per week of regular exercise.  Walking is great!  Nutrition: Choose healthy sources of calcium, iron, and protein.  Avoid ultraprocessed foods and added sugar.  Learn about Preeclampsia: preeclampsia is a common, potentially serious high blood pressure complication in pregnancy.  A blood pressure of 140mmHg (systolic or top number) or 90mmHg (diastolic or bottom number) should be evaluated by your doctor.  Aspirin is sometimes prescribed in early pregnancy to prevent preeclampsia in women with risk factors - ask your obstetrician if you should be on this medication.  For more resources, visit:  https://[email or web address].[email or web address]  If you smoke, please try to quit completely but also try to reduce your smoking by as much as possible (as soon as possible).  Do not vape.  Please also avoid cannabis products.  Other warning signs to watch out for in pregnancy or postpartum: chest pain, obstructed breathing or shortness of breath, seizures, thoughts of hurting yourself or your baby, bleeding, a painful or swollen leg, fever, or headache (see [facility] Sign[facility]).  If these happen call 911.  Itching is also not normal in pregnancy and if you experience this, especially over your hands and feet, potentially worse at night, notify your doctors.     Kick Counts in Pregnancy   AMBULATORY CARE:   Kick counts  measure how much your baby is moving in your womb. A kick from your baby can be felt as a twist, turn, swish, roll, or jab. It is common to feel your baby kicking at 26 to 28 weeks of pregnancy. You may feel your baby kick as early as 20 weeks of pregnancy. You may want to start counting at 28 weeks.   Contact your  doctor immediately if:   You feel a change in the number of kicks or movements of your baby.      You feel fewer than 10 kicks within 2 hours.      You have questions or concerns about your baby's movements.     Why measure kick counts:  Your baby's movement may provide information about your baby's health. He or she may move less, or not at all, if there are problems. Your baby may move less if he or she is not getting enough oxygen or nutrition from the placenta. Do not smoke while you are pregnant. Smoking decreases the amount of oxygen that gets to your baby. Talk to your healthcare provider if you need help to quit smoking. Tell your healthcare provider as soon as you feel a change in your baby's movements.  When to measure kick counts:   Measure kick counts at the same time every day.       Measure kick counts when your baby is awake and most active. Your baby may be most active in the evening.     How to measure kick counts:  Check that your baby is awake before you measure kick counts. You can wake up your baby by lightly pushing on your belly, walking, or drinking something cold. Your healthcare provider may tell you different ways to measure kick counts. You may be told to do the following:  Use a chart or clock to keep track of the time you start and finish counting.      Sit in a chair or lie on your left side.      Place your hands on the largest part of your belly.      Count until you reach 10 kicks. Write down how much time it takes to count 10 kicks.      It may take 30 minutes to 2 hours to count 10 kicks. It should not take more than 2 hours to count 10 kicks.     Follow up with your doctor as directed:  Write down your questions so you remember to ask them during your visits.   © Copyright [employer] 2023 Information is for End User's use only and may not be sold, redistributed or otherwise used for commercial purposes.  The above information is an  only. It is not intended as  medical advice for individual conditions or treatments. Talk to your doctor, nurse or pharmacist before following any medical regimen to see if it is safe and effective for you.

## 2025-07-15 ENCOUNTER — ROUTINE PRENATAL (OUTPATIENT)
Dept: OBGYN CLINIC | Facility: MEDICAL CENTER | Age: 30
End: 2025-07-15
Payer: COMMERCIAL

## 2025-07-15 VITALS
BODY MASS INDEX: 34.66 KG/M2 | SYSTOLIC BLOOD PRESSURE: 112 MMHG | DIASTOLIC BLOOD PRESSURE: 70 MMHG | WEIGHT: 203 LBS | HEIGHT: 64 IN

## 2025-07-15 DIAGNOSIS — O09.892 SHORT INTERVAL BETWEEN PREGNANCIES COMPLICATING PREGNANCY, ANTEPARTUM, SECOND TRIMESTER: ICD-10-CM

## 2025-07-15 DIAGNOSIS — E07.9 THYROID DISEASE AFFECTING PREGNANCY: ICD-10-CM

## 2025-07-15 DIAGNOSIS — Z23 ENCOUNTER FOR IMMUNIZATION: ICD-10-CM

## 2025-07-15 DIAGNOSIS — O99.280 THYROID DISEASE AFFECTING PREGNANCY: ICD-10-CM

## 2025-07-15 DIAGNOSIS — Z3A.28 28 WEEKS GESTATION OF PREGNANCY: Primary | ICD-10-CM

## 2025-07-15 DIAGNOSIS — O28.0 ABNORMAL MSAFP (MATERNAL SERUM ALPHA-FETOPROTEIN), ELEVATED: ICD-10-CM

## 2025-07-15 PROCEDURE — 99213 OFFICE O/P EST LOW 20 MIN: CPT | Performed by: OBSTETRICS & GYNECOLOGY

## 2025-07-15 PROCEDURE — 90715 TDAP VACCINE 7 YRS/> IM: CPT

## 2025-07-15 PROCEDURE — 90471 IMMUNIZATION ADMIN: CPT

## 2025-07-17 ENCOUNTER — ANCILLARY PROCEDURE (OUTPATIENT)
Age: 30
End: 2025-07-17
Attending: OBSTETRICS & GYNECOLOGY
Payer: COMMERCIAL

## 2025-07-17 ENCOUNTER — APPOINTMENT (OUTPATIENT)
Dept: LAB | Facility: HOSPITAL | Age: 30
End: 2025-07-17
Payer: COMMERCIAL

## 2025-07-17 ENCOUNTER — RESULTS FOLLOW-UP (OUTPATIENT)
Dept: OBGYN CLINIC | Facility: MEDICAL CENTER | Age: 30
End: 2025-07-17

## 2025-07-17 ENCOUNTER — ULTRASOUND (OUTPATIENT)
Age: 30
End: 2025-07-17
Attending: OBSTETRICS & GYNECOLOGY
Payer: COMMERCIAL

## 2025-07-17 VITALS
BODY MASS INDEX: 34.86 KG/M2 | HEART RATE: 83 BPM | HEIGHT: 64 IN | WEIGHT: 204.2 LBS | DIASTOLIC BLOOD PRESSURE: 66 MMHG | SYSTOLIC BLOOD PRESSURE: 102 MMHG

## 2025-07-17 DIAGNOSIS — O28.0 ABNORMAL MSAFP (MATERNAL SERUM ALPHA-FETOPROTEIN), ELEVATED: ICD-10-CM

## 2025-07-17 DIAGNOSIS — Z3A.28 28 WEEKS GESTATION OF PREGNANCY: Primary | ICD-10-CM

## 2025-07-17 DIAGNOSIS — Z3A.27 27 WEEKS GESTATION OF PREGNANCY: ICD-10-CM

## 2025-07-17 DIAGNOSIS — D50.8 OTHER IRON DEFICIENCY ANEMIA: Primary | ICD-10-CM

## 2025-07-17 DIAGNOSIS — O24.419 GESTATIONAL DIABETES MELLITUS (GDM) IN THIRD TRIMESTER, GESTATIONAL DIABETES METHOD OF CONTROL UNSPECIFIED: ICD-10-CM

## 2025-07-17 PROCEDURE — 76816 OB US FOLLOW-UP PER FETUS: CPT | Performed by: OBSTETRICS & GYNECOLOGY

## 2025-07-17 PROCEDURE — 99213 OFFICE O/P EST LOW 20 MIN: CPT | Performed by: OBSTETRICS & GYNECOLOGY

## 2025-07-17 PROCEDURE — NC001 PR NO CHARGE: Performed by: OBSTETRICS & GYNECOLOGY

## 2025-07-17 NOTE — ASSESSMENT & PLAN NOTE
New diagnosis today.  We discussed that gestational diabetes is one of the most common medical complications of pregnancy, affecting 5-6% of [place] women. Women with GDM have an increased risk of preeclampsia and  delivery and of diabetes later in life. The offspring of women with GDM are at increased risk of macrosomia,  hypoglycemia, hyperbilirubinemia, shoulder dystocia, and birth trauma.  GDM management begins with dietary modifications, exercise, and glucose monitoring.  Basic targets include reducing added sugar (<25g/day), reducing processed foods, and adding postprandial walks.  The goal of medical nutrition therapy (MNT) in women with GDM is to achieve euglycemia, prevent ketosis, achieve adequate weight gain, and contribute to appropriate fetal growth and development. Insulin, which does not cross the placenta, can achieve tight metabolic control and traditionally has been added to nutrition therapy if fastings are elevated. Oral antidiabetic medication, specifically metformin, is also used frequently.  Fetal growth is advised every 4 weeks. For GDMA2, twice weekly antepartum fetal surveillance is advised starting at 32 weeks, and delivery by EDC is advised.   For isolated GDMA1, initiation of antepartum fetal surveillance is advised if gestation continues past 40 weeks. All patients with GDM should have close follow-up with our multidisciplinary Diabetes in Pregnancy Program as well as postpartum screening for overt diabetes mellitus via 75g test.

## 2025-07-17 NOTE — PROGRESS NOTES
[facility]: Mala was seen today for fetal growth assessment ultrasound. Report with images are contained in the ultrasound report located under Chart Review tab in Epic.       -----------------------------------------    Problem List Items Addressed This Visit          Endocrine    Gestational diabetes mellitus (GDM) in third trimester    New diagnosis today.  We discussed that gestational diabetes is one of the most common medical complications of pregnancy, affecting 5-6% of [place] women. Women with GDM have an increased risk of preeclampsia and  delivery and of diabetes later in life. The offspring of women with GDM are at increased risk of macrosomia,  hypoglycemia, hyperbilirubinemia, shoulder dystocia, and birth trauma.  GDM management begins with dietary modifications, exercise, and glucose monitoring.  Basic targets include reducing added sugar (<25g/day), reducing processed foods, and adding postprandial walks.  The goal of medical nutrition therapy (MNT) in women with GDM is to achieve euglycemia, prevent ketosis, achieve adequate weight gain, and contribute to appropriate fetal growth and development. Insulin, which does not cross the placenta, can achieve tight metabolic control and traditionally has been added to nutrition therapy if fastings are elevated. Oral antidiabetic medication, specifically metformin, is also used frequently.  Fetal growth is advised every 4 weeks. For GDMA2, twice weekly antepartum fetal surveillance is advised starting at 32 weeks, and delivery by EDC is advised.   For isolated GDMA1, initiation of antepartum fetal surveillance is advised if gestation continues past 40 weeks. All patients with GDM should have close follow-up with our multidisciplinary Diabetes in Pregnancy Program as well as postpartum screening for overt diabetes mellitus via 75g test.              Other    Abnormal MSAFP (maternal serum alpha-fetoprotein), elevated     2.39MoM; weekly testing 36+ weeks.          Other Visit Diagnoses         28 weeks gestation of pregnancy    -  Primary          The time spent on this established patient on the encounter date included 2 minutes previsit service time reviewing records and precharting, 7 minutes face-to-face service time counseling regarding results and coordinating care, and  3 minutes charting, totalling 12 minutes.  Please call our office at [phone] with questions.  -Marline Rivas MD

## 2025-07-22 NOTE — PATIENT INSTRUCTIONS
CLASS 1   The Diabetes and Pregnancy Program   [facility] - Maternal Fetal Medicine    Diabetes Team  - LIZ Sood CDE   - Kirsten (Mayank Alexander RD MS  - Hanna Pete RD CDE MPH  - Vidhi King RDN, LDN    Contact  - Reply to a previous message from the diabetes team  - Send a message to Daisy Tijerina  - Call [phone] (recommended if high priority)    Gestational Diabetes    CHECKING BLOOD SUGARS  Carry your meter and testing supplies with you at all times. Bring your glucose meter to all your appointments in our [facility] office.     Your prescription for a glucometer, test strips, and lancets  A request for a glucose meter, test strips and lancets was sent to the provider for approval. Once your prescriptions are approved by the provider, your prescription will be sent electronically to your pharmacy.     The provider is seeing patients in the office today so orders allow time for your prescriptions to be approved. We recommend calling your pharmacy to verify your medication was received electronically and is ready for pick-up before going to pharmacy.     Issues with insurance coverage?   Check your formulary on your insurance website or call the number on your insurance card to determine the preferred brand glucometer, testing strips, and lancets. Some examples of brands include One Touch, Contour, Accuchek, Freestyle [place]). Notify the diabetes team of your preferred brand and a new order will be placed.    Need to purchase over the counter?   We recommend either the [employer] ReliOn Prime or [facility] Advanced. Please notify the diabetes team if you will be using one of these meters.    Check out online coupons for testing strips:  - One Touch Verio: RX Finder  Automatic Savings for Diabetes Supplies  OneTouch®   - Contour Next: Contour® NEXT Test Strips Discounts  [facility]     How to Check Blood Sugars:  1) Wash your hands. We recommend unscented soap and water to avoid  "potentially inaccurate readings.   Alcohol can cause false elevated glucose readings if not fully dried and may dry your skin with continued use.  2) Gather your glucose meter kit and supplies.  3) Prepare your meter by inserting a new test strip. This will automatically turn on your meter. Use a new test strip each time. Avoid using  testing strips.  4) Prepare your lancing device by inserting the lancet. After the lancet is securely in place, twist off the top to reveal needle. Reattach lancing device top.  5) Once lancing device top is reattached, you are now ready to prick the side of your fingertip to get a blood sample. You can adjust the depth setting as needed. We recommend to start at \"4\".   6) Apply the blood sample to test strip according to instructions.   7) Properly dispose of your lancet. Use a new lancet every time. Make sure your sharp container is: heavy duty plastic, puncture-resistant lid, upright and stable, leak resistant, properly labeled.   8) Record your blood sugar      Instructions   One Touch Verio: OneTouch Verio Flex®  Blood Glucose Meter  OneTouch®   Contour Next: Instructional Videos - [facility]     Frequency: 4 Times Daily     Timing:   Fasting   Test when you wake up before you have anything to eat or drink  At least 8hrs but no more than 10hrs from your bedtime snack  Exceeding 10hrs may result in inaccurate readings  2 hrs after the first bite of your meal (breakfast, lunch, dinner) **do not test for snacks    Goals:    Fasting  60-95   1 Hour   After Meals <140   2 Hours   After Meals <120   *please inform member of diabetes team if you begin testing 1hr blood sugars    REPORTING BLOOD SUGARS:   Please only pick ONE way to report to our team. Choose the best option for you.     [employer] Blood Glucose Flow sheet under \"Track my Health\"   Remember to click \"save\" for your readings to automatically upload into Epic.   [employer] Message with " Attachment(s)  Send a picture of a written blood sugar log   To: LIZ Sood (Medical Question - Non Urgent)  You may include up to 3 images per message  Leave Voicemail at [phone]   Include: Name, Date of Birth, and Date + Blood Sugars    The diabetes team will review your blood sugar log weekly till delivery.             MEAL PLAN AND DIET INSTRUCTIONS    *Carbohydrates: Sources of food that increase your blood sugar (include: starches, fruits, milk, desserts, starchy vegetables such as corn, peas, squash)    Meal Plan (3 Meals and 3 Snacks)  Outlines grams of carbohydrates to have at each meal and snack  Minimum Carbohydrate Intake Daily (avoid ketosis): 175g *to be distributed throughout day as instructed in meal plan  Include Protein at Every Meal and Snack  Eat all 3 meals and 3 snacks  Eating every 2 to 3.5 hours during the day.   Preferably at the same times every day.   Avoid going long periods of time without eating.        Be sure to have bedtime snack that includes at least 30 grams of carbohydrates and 2 ounces (14 grams) of protein.  Refer to Food Lists in Booklet (pages 15-17)   Each food listed is equal to 15g (1 Carbohydrate Serving)  Read Food Label   Check Total Carbohydrate  15g = 1 Carbohydrate Serving  Check Serving Size!   The total carbohydrate amount listed is based on 1 serving size  Be careful as many items contain more than one serving per package  Check Total Sugars  Choose items with <15g per serving of total sugar    Exercise:  If ok by your OB try adding a 20-30 minute walk after dinner to help keep fasting blood sugar within range.  Try incorporating at least 10 minutes of walking after each meal  Resource: Exercise During Pregnancy  ACOG     Additional Information: (to be reviewed at class 2)  Continue to follow up with your OB and MFM providers as recommended.  Always have glucose available for hypoglycemia, use 15 by 15 rule. (Page 29 in booklet)  While sick or feeling  ill, follow our sick day guidelines in our GDM booklet. (Page 28 in booklet)  For travel and dining out tips refer to your GDM booklet. See attachment (Page 31 in booklet)    Class 2 Information: Approximately 1 week following Class 1  Bring 3 Day Food Log (everything you eat and drink for each meal and snack) or write down meals associated with elevated after meal blood sugars  Will review diet more in depth and provide feedback     Remember: Importance of maintaining tight control of blood sugars during pregnancy = decrease risk factors including fetal macrosomia; birth injury; risk of ; polyhydramnios; pre-term labor; pre-eclampsia;  hypoglycemia; jaundice and stillbirth.    Any questions give us a call at [phone] or send us a Mobile [employer] message to LIZ Sood.     Kirsten Alexander RD  Diabetes Educator   The Diabetes and Pregnancy Program  At [facility] - Maternal Fetal Medicine     -----    Thank you for choosing us for your  care today.  If you have any questions about your ultrasound or care, please do not hesitate to contact us or your primary obstetrician.        Some general instructions for your pregnancy are:    Exercise: Aim for 150 minutes per week of regular exercise.  Walking is great!  Nutrition: Choose healthy sources of calcium, iron, and protein.  Avoid ultraprocessed foods and added sugar.  Learn about Preeclampsia: preeclampsia is a common, potentially serious high blood pressure complication in pregnancy.  A blood pressure of 140mmHg (systolic or top number) or 90mmHg (diastolic or bottom number) should be evaluated by your doctor.  Aspirin is sometimes prescribed in early pregnancy to prevent preeclampsia in women with risk factors - ask your obstetrician if you should be on this medication.  For more resources, visit:  https://[email or web address].org/preeclampsia  If you smoke, please try to quit completely but also try to reduce your  smoking by as much as possible (as soon as possible).  Do not vape.  Please also avoid cannabis products.  Other warning signs to watch out for in pregnancy or postpartum: chest pain, obstructed breathing or shortness of breath, seizures, thoughts of hurting yourself or your baby, bleeding, a painful or swollen leg, fever, or headache (see [facility] POST-BIRTH Warning Signs campaign).  If these happen call 911.  Itching is also not normal in pregnancy and if you experience this, especially over your hands and feet, potentially worse at night, notify your doctors.

## 2025-07-23 ENCOUNTER — TELEMEDICINE (OUTPATIENT)
Dept: PERINATAL CARE | Facility: CLINIC | Age: 30
End: 2025-07-23
Payer: COMMERCIAL

## 2025-07-23 DIAGNOSIS — Z3A.29 29 WEEKS GESTATION OF PREGNANCY: ICD-10-CM

## 2025-07-23 DIAGNOSIS — O24.419 GESTATIONAL DIABETES MELLITUS (GDM) IN THIRD TRIMESTER, GESTATIONAL DIABETES METHOD OF CONTROL UNSPECIFIED: Primary | ICD-10-CM

## 2025-07-23 LAB
DME PARACHUTE DELIVERY DATE REQUESTED: NORMAL
DME PARACHUTE ITEM DESCRIPTION: NORMAL
DME PARACHUTE ORDER STATUS: NORMAL
DME PARACHUTE SUPPLIER NAME: NORMAL
DME PARACHUTE SUPPLIER PHONE: NORMAL

## 2025-07-23 PROCEDURE — G0109 DIAB MANAGE TRN IND/GROUP: HCPCS

## 2025-07-23 RX ORDER — BLOOD-GLUCOSE METER
EACH MISCELLANEOUS
Qty: 1 KIT | Refills: 0 | Status: SHIPPED | OUTPATIENT
Start: 2025-07-23 | End: 2025-10-05

## 2025-07-23 RX ORDER — BLOOD SUGAR DIAGNOSTIC
STRIP MISCELLANEOUS
Qty: 100 STRIP | Refills: 5 | Status: SHIPPED | OUTPATIENT
Start: 2025-07-23 | End: 2025-10-05

## 2025-07-23 RX ORDER — LANCETS 33 GAUGE
EACH MISCELLANEOUS
Qty: 100 EACH | Refills: 5 | Status: SHIPPED | OUTPATIENT
Start: 2025-07-23 | End: 2025-10-05

## 2025-07-23 NOTE — PROGRESS NOTES
CLASS 1   The Diabetes and Pregnancy Program  Group  virtual visit    Mala Jaramillo is a 29 y.o. female who presents today unaccompanied for Virtual Regular Visit, Patient Education, and Gestational Diabetes Patient is at 29w3d gestation, Estimated Date of Delivery: 10/5/25.     Self-Assessment Questionnaire: Completed via [employer]    Additional questionnaire responses reported throughout the following note. Reviewed and updated the following from patients medical record: Demographics, Education, Occupation, PMH, Allergies, and Current Medications.     Problem List Items Addressed This Visit          Endocrine    Gestational diabetes mellitus (GDM) in third trimester - Primary      Lab Results   Component Value Date    HGBA1C 5.4 2022       Orders:    Project Playlist glucose flowsheet           Relevant Orders    Project Playlist glucose flowsheet     Other Visit Diagnoses         29 weeks gestation of pregnancy        Relevant Orders    Project Playlist glucose flowsheet          Discussed:   - Pathophysiology of Gestational diabetes mellitus (GDM) in third trimester, gestational diabetes method of control unspecified [O24.419].  - Untreated hyperglycemia in pregnancy and maternal fetal complications (fetal macrosomia,  hypoglycemia, polyhydramnios, increased incidence of  section,  labor, and in severe cases fetal demise and still birth).   - Importance of blood glucose monitoring, nutrition, and medication if necessary in achieving BG goals.     LABS  Lab Results   Component Value Date/Time    XCF4EKWV98HQ 140 (H) 2025 03:18 PM      Lab Results   Component Value Date/Time    GLUF 77 2025 06:11 AM    BHIBCVK2RH 189 (H) 2025 07:11 AM    AIGXQAG0ML 161 (H) 2025 08:10 AM    WRLHTVU2LQ 83 2025 11:25 AM      Lab Results   Component Value Date/Time    HGBA1C 5.4 2022 07:18 AM    HGBA1C 5.3 2021 01:07 PM        MEDICATIONS  Diabetic Medications:  "None    ANTHROPOMETRICS  Pre-Pregnancy:   Pre-Gravid Wt: 85.3 kg (188 lb)  Pre-Gravid BMI: 32.25    Current:  Ht Readings from Last 1 Encounters:   07/17/25 5' 4\" (1.626 m)      Wt Readings from Last 3 Encounters:   07/17/25 92.6 kg (204 lb 3.2 oz)   07/15/25 92.1 kg (203 lb)   06/30/25 90.4 kg (199 lb 4.8 oz)      Current BMI: There is no height or weight on file to calculate BMI.    Weight Gain in Pregnancy:  Current TWG (7.349 kg (16 lb 3.2 oz)) compared to recommended TWG (5 kg (11 lb)-9 kg (19 lb)): Exceeding -- Recommended to maintain wt for remainder of pregnancy    RECENT ULTRASOUND RESULTS  Findings: NML Growth/TATYANA  See US note for additional details.   Next US: Scheduled Appropriately    BLOOD GLUCOSE MONITORING  Ordered Glucometer, Testing Strips, and Lancets  Meter Teaching: Gave instruction on site selection, skin preparation, loading strips and lancet device, meter activation, obtaining blood sample, test strip and lancet disposal and storage, and recording log book entries.   - Frequency: 4 x per day (Fasting, 2 hour after start of each meal)  - Goals: (Fasting) 60-95mg/dL // (2hr PP) <120mg/dL  - Reporting: Weekly via [employer] Glucose Flowsheet     DIET   - Daily Calories/Carbohydrate/Protein: 2000 calorie (CHO: 45-15-60-15-60-30) (PRO: 2/3-1-3/4-1-3/4-2)   - Appropriate amounts of CHO, PRO, and Fat at each meal and snack.   - CHO exchange list, and portion sizes for both CHO and PRO  - How to read a food label  - Suggested meal/snack options to increase nutrition and maintain consistent meal and snack intakes  - Eat every 2.0-3.5 hours while awake  - Go no longer than 8-10 hours fasting overnight until first meal of the day.     PHYSICAL ACTIVITY  - Discussed benefits of physical activity to optimize blood glucose control, encouraged activity at patient is physically able.   - Instructed pt to always consult a physician prior to starting an exercise program.   - Recommend 20-30 minutes daily. " "    Patient Stated Goal: \"I will check my blood sugar 4 times each day, as directed by diabetes and pregnancy team\"  Expected Compliance: good  Barriers to Learning/Change: No Barriers  Diabetes Self Management Support Plan (outside of ongoing care): Spouse/Family     Follow-up: Return in 1 week (on 2025) for Class 2 w/ Kirsten Alexander RD.   - Patient instructed to bring 3 day food diary and/or write down foods associated with elevated after meal blood sugars  - Call ([phone]) with any questions or concerns.    Start Time: 2:30pm  End Time: 3:30pm    Kirsten Alexander RD   Diabetes Educator  [facility]  Diabetes and Pregnancy Program  [address]  [place] [id number]    Virtual Regular Visit  Name: Mala Jaramillo      : 1995      MRN: [id number]  Encounter Provider: Kirsten Alexander RD  Encounter Date: 2025   Encounter department: [facility]  :  Assessment & Plan  Gestational diabetes mellitus (GDM) in third trimester, gestational diabetes method of control unspecified    Lab Results   Component Value Date    HGBA1C 5.4 2022       Orders:    Mychart glucose flowsheet    29 weeks gestation of pregnancy    Orders:    Mychart glucose flowsheet    Gestational diabetes mellitus (GDM) in third trimester, gestational diabetes method of control unspecified    Lab Results   Component Value Date    HGBA1C 5.4 2022          29 weeks gestation of pregnancy               History of Present Illness     HPI  Review of Systems    Objective   LMP 2024 (Within Weeks)     Physical Exam    Administrative Statements   Encounter provider Kirsten Alexander RD    The Patient is located at Home and in the following state in which I hold an active license [place].    The patient was identified by name and date of birth. Mala Jaramillo was informed that this is a telemedicine visit and that the visit is being conducted through the [employer] " platform. She agrees to proceed..  My office door was closed. No one else was in the room.  She acknowledged consent and understanding of privacy and security of the video platform. The patient has agreed to participate and understands they can discontinue the visit at any time.    I have spent a total time of 60 minutes in caring for this patient on the day of the visit/encounter.

## 2025-07-23 NOTE — ASSESSMENT & PLAN NOTE
Lab Results   Component Value Date    HGBA1C 5.4 03/23/2022       Orders:    Mychart glucose flowsheet

## 2025-07-23 NOTE — TELEPHONE ENCOUNTER
Patient called, reports her insurance is requesting prescription for breast pump and maternity belt.      She is also requesting an order for the iron supplementation.

## 2025-07-24 RX ORDER — FERROUS SULFATE 324(65)MG
324 TABLET, DELAYED RELEASE (ENTERIC COATED) ORAL
Qty: 180 TABLET | Refills: 3 | Status: SHIPPED | OUTPATIENT
Start: 2025-07-24

## 2025-07-28 PROBLEM — Z3A.30 30 WEEKS GESTATION OF PREGNANCY: Status: ACTIVE | Noted: 2025-07-28

## 2025-07-28 NOTE — PATIENT COMMUNICATION
3RD TRIMESTER CHECK-IN     Overall how are you feeling? Patient reports to be doing well overall.      Compliant with routine OB appointments? Yes.     Have you completed your 3rd trimester lab work? Yes.     Have you reviewed the contents of 3rd trimester folder from office? Yes.                Have you decided on a pediatrician? [facility]                            Questions on paperwork to go back to office? Aware to bring back paperwork next visit.  Questions on the baby birth certificate forms? No.

## 2025-07-29 ENCOUNTER — ROUTINE PRENATAL (OUTPATIENT)
Dept: OBGYN CLINIC | Facility: MEDICAL CENTER | Age: 30
End: 2025-07-29
Payer: COMMERCIAL

## 2025-07-29 VITALS — WEIGHT: 204 LBS | SYSTOLIC BLOOD PRESSURE: 118 MMHG | BODY MASS INDEX: 35.02 KG/M2 | DIASTOLIC BLOOD PRESSURE: 70 MMHG

## 2025-07-29 DIAGNOSIS — Z3A.30 30 WEEKS GESTATION OF PREGNANCY: Primary | ICD-10-CM

## 2025-07-29 DIAGNOSIS — E07.9 THYROID DISEASE AFFECTING PREGNANCY: ICD-10-CM

## 2025-07-29 DIAGNOSIS — O09.892 SHORT INTERVAL BETWEEN PREGNANCIES COMPLICATING PREGNANCY, ANTEPARTUM, SECOND TRIMESTER: ICD-10-CM

## 2025-07-29 DIAGNOSIS — O24.410 DIET CONTROLLED GESTATIONAL DIABETES MELLITUS (GDM) IN THIRD TRIMESTER: ICD-10-CM

## 2025-07-29 DIAGNOSIS — O99.280 THYROID DISEASE AFFECTING PREGNANCY: ICD-10-CM

## 2025-07-29 PROCEDURE — 99213 OFFICE O/P EST LOW 20 MIN: CPT | Performed by: OBSTETRICS & GYNECOLOGY

## 2025-07-30 ENCOUNTER — TELEPHONE (OUTPATIENT)
Dept: OBGYN CLINIC | Facility: MEDICAL CENTER | Age: 30
End: 2025-07-30

## 2025-07-30 ENCOUNTER — TELEMEDICINE (OUTPATIENT)
Dept: PERINATAL CARE | Facility: CLINIC | Age: 30
End: 2025-07-30
Payer: COMMERCIAL

## 2025-07-30 DIAGNOSIS — K21.9 GASTROESOPHAGEAL REFLUX IN PREGNANCY IN THIRD TRIMESTER: Primary | ICD-10-CM

## 2025-07-30 DIAGNOSIS — O24.419 GESTATIONAL DIABETES MELLITUS (GDM) IN THIRD TRIMESTER, GESTATIONAL DIABETES METHOD OF CONTROL UNSPECIFIED: Primary | ICD-10-CM

## 2025-07-30 DIAGNOSIS — O99.613 GASTROESOPHAGEAL REFLUX IN PREGNANCY IN THIRD TRIMESTER: Primary | ICD-10-CM

## 2025-07-30 DIAGNOSIS — O24.410 DIET CONTROLLED GESTATIONAL DIABETES MELLITUS (GDM) IN THIRD TRIMESTER: Primary | ICD-10-CM

## 2025-07-30 DIAGNOSIS — Z3A.30 30 WEEKS GESTATION OF PREGNANCY: ICD-10-CM

## 2025-07-30 PROCEDURE — G0108 DIAB MANAGE TRN  PER INDIV: HCPCS

## 2025-07-30 RX ORDER — CALCIUM CITRATE/VITAMIN D3 200MG-6.25
TABLET ORAL
Qty: 100 EACH | Refills: 0 | Status: SHIPPED | OUTPATIENT
Start: 2025-07-30

## 2025-07-30 RX ORDER — OMEPRAZOLE 20 MG/1
20 CAPSULE, DELAYED RELEASE ORAL DAILY
Qty: 30 CAPSULE | Refills: 2 | Status: SHIPPED | OUTPATIENT
Start: 2025-07-30 | End: 2025-08-29

## 2025-07-30 RX ORDER — CALCIUM CITRATE/VITAMIN D3 200MG-6.25
1 TABLET ORAL
Qty: 100 EACH | Refills: 3 | Status: SHIPPED | OUTPATIENT
Start: 2025-07-30 | End: 2025-11-07

## 2025-07-30 RX ORDER — GLUCOSAM/CHON-MSM1/C/MANG/BOSW 500-416.6
TABLET ORAL
Qty: 100 EACH | Refills: 3 | Status: SHIPPED | OUTPATIENT
Start: 2025-07-30 | End: 2025-10-05

## 2025-07-31 ENCOUNTER — TELEPHONE (OUTPATIENT)
Age: 30
End: 2025-07-31

## 2025-08-04 ENCOUNTER — NURSE TRIAGE (OUTPATIENT)
Age: 30
End: 2025-08-04

## 2025-08-04 ENCOUNTER — HOSPITAL ENCOUNTER (OUTPATIENT)
Facility: HOSPITAL | Age: 30
Discharge: HOME | End: 2025-08-04
Attending: OBSTETRICS & GYNECOLOGY | Admitting: OBSTETRICS & GYNECOLOGY
Payer: COMMERCIAL

## 2025-08-04 PROBLEM — Z3A.31 31 WEEKS GESTATION OF PREGNANCY: Status: ACTIVE | Noted: 2025-05-05

## 2025-08-08 ENCOUNTER — TELEPHONE (OUTPATIENT)
Dept: OBGYN CLINIC | Facility: MEDICAL CENTER | Age: 30
End: 2025-08-08

## 2025-08-12 PROBLEM — O24.410 DIET CONTROLLED GESTATIONAL DIABETES MELLITUS (GDM) IN THIRD TRIMESTER: Status: ACTIVE | Noted: 2025-07-17

## 2025-08-12 PROBLEM — Z3A.28 28 WEEKS GESTATION OF PREGNANCY: Status: RESOLVED | Noted: 2025-07-17 | Resolved: 2025-08-12

## 2025-08-12 PROBLEM — O99.211 OBESITY AFFECTING PREGNANCY IN FIRST TRIMESTER: Status: RESOLVED | Noted: 2025-03-20 | Resolved: 2025-08-12

## 2025-08-12 PROBLEM — O99.210 MATERNAL OBESITY AFFECTING PREGNANCY, ANTEPARTUM: Status: ACTIVE | Noted: 2025-05-28

## 2025-08-12 PROBLEM — Z3A.32 32 WEEKS GESTATION OF PREGNANCY: Status: ACTIVE | Noted: 2025-08-12

## 2025-08-12 PROBLEM — O09.892 SHORT INTERVAL BETWEEN PREGNANCIES COMPLICATING PREGNANCY, ANTEPARTUM, SECOND TRIMESTER: Status: RESOLVED | Noted: 2025-05-28 | Resolved: 2025-08-12

## 2025-08-12 PROBLEM — F41.1 GENERALIZED ANXIETY DISORDER: Status: RESOLVED | Noted: 2018-12-23 | Resolved: 2025-08-12

## 2025-08-12 PROBLEM — O99.283 HYPOTHYROIDISM DURING PREGNANCY IN THIRD TRIMESTER: Status: ACTIVE | Noted: 2025-05-28

## 2025-08-12 PROBLEM — Z3A.30 30 WEEKS GESTATION OF PREGNANCY: Status: RESOLVED | Noted: 2025-07-28 | Resolved: 2025-08-12

## 2025-08-12 PROBLEM — Z3A.31 31 WEEKS GESTATION OF PREGNANCY: Status: RESOLVED | Noted: 2025-05-05 | Resolved: 2025-08-12

## 2025-08-14 ENCOUNTER — ULTRASOUND (OUTPATIENT)
Age: 30
End: 2025-08-14
Attending: OBSTETRICS & GYNECOLOGY
Payer: COMMERCIAL

## 2025-08-14 ENCOUNTER — ANCILLARY PROCEDURE (OUTPATIENT)
Age: 30
End: 2025-08-14
Attending: OBSTETRICS & GYNECOLOGY
Payer: COMMERCIAL

## 2025-08-14 ENCOUNTER — OFFICE VISIT (OUTPATIENT)
Age: 30
End: 2025-08-14
Payer: COMMERCIAL

## 2025-08-19 ENCOUNTER — NURSE TRIAGE (OUTPATIENT)
Age: 30
End: 2025-08-19

## 2025-08-21 ENCOUNTER — PROCEDURE VISIT (OUTPATIENT)
Age: 30
End: 2025-08-21
Payer: COMMERCIAL

## 2025-08-21 VITALS — BODY MASS INDEX: 34.49 KG/M2 | WEIGHT: 202 LBS | HEIGHT: 64 IN

## 2025-08-21 DIAGNOSIS — O26.893 LOW BACK PAIN DURING PREGNANCY IN THIRD TRIMESTER: ICD-10-CM

## 2025-08-21 DIAGNOSIS — M54.50 LOW BACK PAIN DURING PREGNANCY IN THIRD TRIMESTER: ICD-10-CM

## 2025-08-21 DIAGNOSIS — M99.04 SEGMENTAL DYSFUNCTION OF SACRAL REGION: ICD-10-CM

## 2025-08-21 DIAGNOSIS — M99.03 SEGMENTAL DYSFUNCTION OF LUMBAR REGION: ICD-10-CM

## 2025-08-21 DIAGNOSIS — M99.05 SEGMENTAL DYSFUNCTION OF PELVIC REGION: ICD-10-CM

## 2025-08-21 DIAGNOSIS — M79.18 MYOFASCIAL PAIN: ICD-10-CM

## 2025-08-21 DIAGNOSIS — M54.9 UPPER BACK PAIN: ICD-10-CM

## 2025-08-21 DIAGNOSIS — M54.50 ACUTE BILATERAL LOW BACK PAIN WITHOUT SCIATICA: Primary | ICD-10-CM

## 2025-08-21 PROCEDURE — 98941 CHIROPRACT MANJ 3-4 REGIONS: CPT | Performed by: CHIROPRACTOR
